# Patient Record
Sex: FEMALE | Race: ASIAN | NOT HISPANIC OR LATINO | Employment: FULL TIME | URBAN - METROPOLITAN AREA
[De-identification: names, ages, dates, MRNs, and addresses within clinical notes are randomized per-mention and may not be internally consistent; named-entity substitution may affect disease eponyms.]

---

## 2017-03-09 ENCOUNTER — AMBULATORY - HEALTHEAST (OUTPATIENT)
Dept: LAB | Facility: CLINIC | Age: 27
End: 2017-03-09

## 2017-03-09 DIAGNOSIS — Z02.89 REFUGEE HEALTH EXAMINATION: ICD-10-CM

## 2017-03-10 ENCOUNTER — AMBULATORY - HEALTHEAST (OUTPATIENT)
Dept: LAB | Facility: CLINIC | Age: 27
End: 2017-03-10

## 2017-03-10 DIAGNOSIS — Z02.89 REFUGEE HEALTH EXAMINATION: ICD-10-CM

## 2017-03-10 LAB
HIV 1+2 AB+HIV1 P24 AG SERPL QL IA: NEGATIVE
LDLC SERPL CALC-MCNC: 142 MG/DL

## 2017-03-11 LAB — HBV SURFACE AG SERPL QL IA: NEGATIVE

## 2017-03-13 LAB
HAV IGG SER QL IA: POSITIVE
HBV CORE AB SERPL QL IA: NEGATIVE
HCV AB SERPL QL IA: NEGATIVE
HEPATITIS B SURFACE ANTIBODY LHE- HISTORICAL: POSITIVE

## 2017-03-23 ENCOUNTER — OFFICE VISIT - HEALTHEAST (OUTPATIENT)
Dept: FAMILY MEDICINE | Facility: CLINIC | Age: 27
End: 2017-03-23

## 2017-03-23 DIAGNOSIS — E11.9 TYPE II DIABETES MELLITUS (H): ICD-10-CM

## 2017-03-23 DIAGNOSIS — Z02.89 REFUGEE HEALTH EXAMINATION: ICD-10-CM

## 2017-03-23 DIAGNOSIS — E66.9 OBESITY: ICD-10-CM

## 2017-03-23 DIAGNOSIS — E78.00 HYPERCHOLESTEREMIA: ICD-10-CM

## 2017-03-23 LAB — HBA1C MFR BLD: 8.1 % (ref 3.5–6)

## 2017-03-23 ASSESSMENT — MIFFLIN-ST. JEOR: SCORE: 1543.7

## 2017-04-13 ENCOUNTER — OFFICE VISIT - HEALTHEAST (OUTPATIENT)
Dept: FAMILY MEDICINE | Facility: CLINIC | Age: 27
End: 2017-04-13

## 2017-04-13 DIAGNOSIS — Z02.1 ENCOUNTER FOR PRE-EMPLOYMENT EXAMINATION: ICD-10-CM

## 2017-06-14 ENCOUNTER — AMBULATORY - HEALTHEAST (OUTPATIENT)
Dept: NURSING | Facility: CLINIC | Age: 27
End: 2017-06-14

## 2017-06-29 ENCOUNTER — OFFICE VISIT - HEALTHEAST (OUTPATIENT)
Dept: FAMILY MEDICINE | Facility: CLINIC | Age: 27
End: 2017-06-29

## 2017-06-29 DIAGNOSIS — E11.9 TYPE 2 DIABETES MELLITUS TREATED WITHOUT INSULIN (H): ICD-10-CM

## 2017-06-29 DIAGNOSIS — Z23 IMMUNIZATION DUE: ICD-10-CM

## 2017-06-29 DIAGNOSIS — Z30.09 CONTRACEPTIVE EDUCATION: ICD-10-CM

## 2017-06-29 ASSESSMENT — MIFFLIN-ST. JEOR: SCORE: 1509.68

## 2017-06-30 LAB — HBA1C MFR BLD: 13.1 % (ref 4.2–6.1)

## 2017-10-20 ENCOUNTER — OFFICE VISIT - HEALTHEAST (OUTPATIENT)
Dept: FAMILY MEDICINE | Facility: CLINIC | Age: 27
End: 2017-10-20

## 2017-10-20 DIAGNOSIS — Z23 NEED FOR INFLUENZA VACCINATION: ICD-10-CM

## 2017-10-20 DIAGNOSIS — E11.9 TYPE 2 DIABETES MELLITUS TREATED WITHOUT INSULIN (H): ICD-10-CM

## 2017-10-20 LAB — HBA1C MFR BLD: 10.8 % (ref 3.5–6)

## 2017-10-20 ASSESSMENT — MIFFLIN-ST. JEOR: SCORE: 1525.56

## 2017-11-15 ENCOUNTER — AMBULATORY - HEALTHEAST (OUTPATIENT)
Dept: FAMILY MEDICINE | Facility: CLINIC | Age: 27
End: 2017-11-15

## 2017-11-15 ENCOUNTER — COMMUNICATION - HEALTHEAST (OUTPATIENT)
Dept: FAMILY MEDICINE | Facility: CLINIC | Age: 27
End: 2017-11-15

## 2017-11-15 ENCOUNTER — OFFICE VISIT - HEALTHEAST (OUTPATIENT)
Dept: FAMILY MEDICINE | Facility: CLINIC | Age: 27
End: 2017-11-15

## 2017-11-15 DIAGNOSIS — Z13.220 SCREENING FOR LIPID DISORDERS: ICD-10-CM

## 2017-11-15 DIAGNOSIS — E78.5 HYPERLIPIDEMIA: ICD-10-CM

## 2017-11-15 DIAGNOSIS — E11.9 TYPE 2 DIABETES MELLITUS TREATED WITHOUT INSULIN (H): ICD-10-CM

## 2017-11-15 LAB
CHOLEST SERPL-MCNC: 235 MG/DL
FASTING STATUS PATIENT QL REPORTED: ABNORMAL
HDLC SERPL-MCNC: 44 MG/DL
LDLC SERPL CALC-MCNC: 162 MG/DL
TRIGL SERPL-MCNC: 143 MG/DL

## 2017-11-15 ASSESSMENT — MIFFLIN-ST. JEOR: SCORE: 1528.96

## 2017-11-17 ENCOUNTER — COMMUNICATION - HEALTHEAST (OUTPATIENT)
Dept: FAMILY MEDICINE | Facility: CLINIC | Age: 27
End: 2017-11-17

## 2017-12-21 ENCOUNTER — OFFICE VISIT - HEALTHEAST (OUTPATIENT)
Dept: FAMILY MEDICINE | Facility: CLINIC | Age: 27
End: 2017-12-21

## 2017-12-21 DIAGNOSIS — N91.2 ABSENCE OF MENSTRUATION: ICD-10-CM

## 2017-12-21 DIAGNOSIS — E11.9 TYPE 2 DIABETES MELLITUS TREATED WITHOUT INSULIN (H): ICD-10-CM

## 2017-12-21 DIAGNOSIS — O09.90 HIGH-RISK PREGNANCY: ICD-10-CM

## 2018-01-09 ENCOUNTER — RECORDS - HEALTHEAST (OUTPATIENT)
Dept: ADMINISTRATIVE | Facility: OTHER | Age: 28
End: 2018-01-09

## 2018-01-10 ENCOUNTER — COMMUNICATION - HEALTHEAST (OUTPATIENT)
Dept: ENDOCRINOLOGY | Facility: CLINIC | Age: 28
End: 2018-01-10

## 2018-01-15 ENCOUNTER — RECORDS - HEALTHEAST (OUTPATIENT)
Dept: ADMINISTRATIVE | Facility: OTHER | Age: 28
End: 2018-01-15

## 2018-01-16 ENCOUNTER — OFFICE VISIT - HEALTHEAST (OUTPATIENT)
Dept: EDUCATION SERVICES | Facility: CLINIC | Age: 28
End: 2018-01-16

## 2018-01-16 DIAGNOSIS — O24.112 PRE-EXISTING TYPE 2 DIABETES MELLITUS DURING PREGNANCY IN SECOND TRIMESTER: ICD-10-CM

## 2018-01-22 ENCOUNTER — OFFICE VISIT - HEALTHEAST (OUTPATIENT)
Dept: EDUCATION SERVICES | Facility: CLINIC | Age: 28
End: 2018-01-22

## 2018-01-22 ENCOUNTER — COMMUNICATION - HEALTHEAST (OUTPATIENT)
Dept: EDUCATION SERVICES | Facility: CLINIC | Age: 28
End: 2018-01-22

## 2018-01-25 ENCOUNTER — MEDICAL CORRESPONDENCE (OUTPATIENT)
Dept: HEALTH INFORMATION MANAGEMENT | Facility: CLINIC | Age: 28
End: 2018-01-25

## 2018-01-25 ENCOUNTER — TRANSFERRED RECORDS (OUTPATIENT)
Dept: HEALTH INFORMATION MANAGEMENT | Facility: CLINIC | Age: 28
End: 2018-01-25

## 2018-01-29 ENCOUNTER — TELEPHONE (OUTPATIENT)
Dept: MATERNAL FETAL MEDICINE | Facility: CLINIC | Age: 28
End: 2018-01-29

## 2018-01-29 NOTE — TELEPHONE ENCOUNTER
PCC LM for Eh regarding consult request received from [facility]. Encouraged patient to return call to discuss where she is seeing Endocrinology- may need TROY for these records. PCC number left on .

## 2018-01-31 ENCOUNTER — TELEPHONE (OUTPATIENT)
Dept: MATERNAL FETAL MEDICINE | Facility: CLINIC | Age: 28
End: 2018-01-31

## 2018-01-31 DIAGNOSIS — E11.9 DIABETES MELLITUS, TYPE 2 (H): Primary | ICD-10-CM

## 2018-01-31 DIAGNOSIS — Z33.1 PREGNANCY, INCIDENTAL: ICD-10-CM

## 2018-01-31 NOTE — TELEPHONE ENCOUNTER
PCC called  using Kellie . Patient states her 4 previous children were born in [place]. She said she goes to a clinic in [place] for her diabetes ([facility] prenatal states she has seen a HE clinic once but has not returned). Communication was difficult over the phone. PCC discussed consult request with Dr Woodruff- donna to schedule  for L2/fetal ECHO/consult.

## 2018-02-01 ENCOUNTER — OFFICE VISIT - HEALTHEAST (OUTPATIENT)
Dept: EDUCATION SERVICES | Facility: CLINIC | Age: 28
End: 2018-02-01

## 2018-02-08 ENCOUNTER — COMMUNICATION - HEALTHEAST (OUTPATIENT)
Dept: EDUCATION SERVICES | Facility: CLINIC | Age: 28
End: 2018-02-08

## 2018-02-08 ENCOUNTER — OFFICE VISIT - HEALTHEAST (OUTPATIENT)
Dept: EDUCATION SERVICES | Facility: CLINIC | Age: 28
End: 2018-02-08

## 2018-02-13 ENCOUNTER — PRE VISIT (OUTPATIENT)
Dept: MATERNAL FETAL MEDICINE | Facility: CLINIC | Age: 28
End: 2018-02-13

## 2018-02-15 ENCOUNTER — AMBULATORY - HEALTHEAST (OUTPATIENT)
Dept: EDUCATION SERVICES | Facility: CLINIC | Age: 28
End: 2018-02-15

## 2018-02-15 DIAGNOSIS — O24.112 PRE-EXISTING TYPE 2 DIABETES MELLITUS DURING PREGNANCY IN SECOND TRIMESTER: ICD-10-CM

## 2018-02-15 LAB — HBA1C MFR BLD: 8 % (ref 3.5–6)

## 2018-02-22 ENCOUNTER — OFFICE VISIT - HEALTHEAST (OUTPATIENT)
Dept: ENDOCRINOLOGY | Facility: CLINIC | Age: 28
End: 2018-02-22

## 2018-02-22 ENCOUNTER — OFFICE VISIT - HEALTHEAST (OUTPATIENT)
Dept: EDUCATION SERVICES | Facility: CLINIC | Age: 28
End: 2018-02-22

## 2018-02-22 DIAGNOSIS — O24.112 PRE-EXISTING TYPE 2 DIABETES MELLITUS DURING PREGNANCY IN SECOND TRIMESTER: ICD-10-CM

## 2018-02-22 DIAGNOSIS — O24.112 PREGNANCY WITH TYPE 2 DIABETES MELLITUS IN SECOND TRIMESTER: ICD-10-CM

## 2018-02-22 ASSESSMENT — MIFFLIN-ST. JEOR: SCORE: 1588.15

## 2018-02-26 ENCOUNTER — AMBULATORY - HEALTHEAST (OUTPATIENT)
Dept: NURSING | Facility: CLINIC | Age: 28
End: 2018-02-26

## 2018-03-01 ENCOUNTER — OFFICE VISIT - HEALTHEAST (OUTPATIENT)
Dept: EDUCATION SERVICES | Facility: CLINIC | Age: 28
End: 2018-03-01

## 2018-03-01 ENCOUNTER — OFFICE VISIT - HEALTHEAST (OUTPATIENT)
Dept: ENDOCRINOLOGY | Facility: CLINIC | Age: 28
End: 2018-03-01

## 2018-03-01 DIAGNOSIS — O24.112 PREGNANCY WITH TYPE 2 DIABETES MELLITUS IN SECOND TRIMESTER: ICD-10-CM

## 2018-03-01 ASSESSMENT — MIFFLIN-ST. JEOR: SCORE: 1599.04

## 2018-03-05 ENCOUNTER — TELEPHONE (OUTPATIENT)
Dept: MATERNAL FETAL MEDICINE | Facility: CLINIC | Age: 28
End: 2018-03-05

## 2018-03-05 NOTE — TELEPHONE ENCOUNTER
MFM received referral from [facility].  Phone call made to referring clinic to notify that patient has no showed x 2 for her MFM appointments, including a no show for a fetal echocardiogram.    Sandrine Graves

## 2018-03-08 ENCOUNTER — OFFICE VISIT - HEALTHEAST (OUTPATIENT)
Dept: ENDOCRINOLOGY | Facility: CLINIC | Age: 28
End: 2018-03-08

## 2018-03-08 ENCOUNTER — OFFICE VISIT - HEALTHEAST (OUTPATIENT)
Dept: EDUCATION SERVICES | Facility: CLINIC | Age: 28
End: 2018-03-08

## 2018-03-08 DIAGNOSIS — O24.112 PRE-EXISTING TYPE 2 DIABETES MELLITUS DURING PREGNANCY IN SECOND TRIMESTER: ICD-10-CM

## 2018-03-08 DIAGNOSIS — O24.112 PREGNANCY WITH TYPE 2 DIABETES MELLITUS IN SECOND TRIMESTER: ICD-10-CM

## 2018-03-08 ASSESSMENT — MIFFLIN-ST. JEOR: SCORE: 1596.41

## 2018-03-15 ENCOUNTER — OFFICE VISIT - HEALTHEAST (OUTPATIENT)
Dept: EDUCATION SERVICES | Facility: CLINIC | Age: 28
End: 2018-03-15

## 2018-03-15 ENCOUNTER — OFFICE VISIT - HEALTHEAST (OUTPATIENT)
Dept: ENDOCRINOLOGY | Facility: CLINIC | Age: 28
End: 2018-03-15

## 2018-03-15 DIAGNOSIS — O24.112 PREGNANCY WITH TYPE 2 DIABETES MELLITUS IN SECOND TRIMESTER: ICD-10-CM

## 2018-03-15 ASSESSMENT — MIFFLIN-ST. JEOR: SCORE: 1613.1

## 2018-03-16 ENCOUNTER — CARE COORDINATION (OUTPATIENT)
Dept: MATERNAL FETAL MEDICINE | Facility: CLINIC | Age: 28
End: 2018-03-16

## 2018-03-22 ENCOUNTER — OFFICE VISIT - HEALTHEAST (OUTPATIENT)
Dept: ENDOCRINOLOGY | Facility: CLINIC | Age: 28
End: 2018-03-22

## 2018-03-22 DIAGNOSIS — O24.112 PRE-EXISTING TYPE 2 DIABETES MELLITUS DURING PREGNANCY IN SECOND TRIMESTER: ICD-10-CM

## 2018-03-22 ASSESSMENT — MIFFLIN-ST. JEOR: SCORE: 1614.01

## 2018-03-27 ENCOUNTER — TELEPHONE (OUTPATIENT)
Dept: MATERNAL FETAL MEDICINE | Facility: CLINIC | Age: 28
End: 2018-03-27

## 2018-03-27 NOTE — TELEPHONE ENCOUNTER
Spoke to Katharine, physician assistance, at [facility] regarding to patient no showing [facility] appointments 3x's. Katharine state that patient was last seen in January and have not come in for check up since. Assistance said to remove order and if patient still wants to be seen at [facility], patient will call.      Referring clinic notified. Removing order.    Hector Loredo,    , [facility]     Patient has also no showed for fetal echocardiogram appointments.  Referring clinic notified.  Removing echo order.    Sandrine Graves

## 2018-04-05 ENCOUNTER — OFFICE VISIT - HEALTHEAST (OUTPATIENT)
Dept: EDUCATION SERVICES | Facility: CLINIC | Age: 28
End: 2018-04-05

## 2018-04-05 ENCOUNTER — OFFICE VISIT - HEALTHEAST (OUTPATIENT)
Dept: ENDOCRINOLOGY | Facility: CLINIC | Age: 28
End: 2018-04-05

## 2018-04-05 DIAGNOSIS — O24.112 PREGNANCY WITH TYPE 2 DIABETES MELLITUS IN SECOND TRIMESTER: ICD-10-CM

## 2018-04-05 DIAGNOSIS — O24.113 PRE-EXISTING TYPE 2 DIABETES MELLITUS DURING PREGNANCY IN THIRD TRIMESTER: ICD-10-CM

## 2018-04-05 ASSESSMENT — MIFFLIN-ST. JEOR: SCORE: 1620.81

## 2018-04-18 ENCOUNTER — OFFICE VISIT - HEALTHEAST (OUTPATIENT)
Dept: FAMILY MEDICINE | Facility: CLINIC | Age: 28
End: 2018-04-18

## 2018-04-18 DIAGNOSIS — R03.0 ELEVATED BLOOD PRESSURE READING: ICD-10-CM

## 2018-04-18 DIAGNOSIS — Z28.39 IMMUNIZATION DEFICIENCY: ICD-10-CM

## 2018-04-18 ASSESSMENT — MIFFLIN-ST. JEOR: SCORE: 731.76

## 2018-04-19 ENCOUNTER — OFFICE VISIT - HEALTHEAST (OUTPATIENT)
Dept: ENDOCRINOLOGY | Facility: CLINIC | Age: 28
End: 2018-04-19

## 2018-04-19 ENCOUNTER — OFFICE VISIT - HEALTHEAST (OUTPATIENT)
Dept: EDUCATION SERVICES | Facility: CLINIC | Age: 28
End: 2018-04-19

## 2018-04-19 DIAGNOSIS — O24.112 PREGNANCY WITH TYPE 2 DIABETES MELLITUS IN SECOND TRIMESTER: ICD-10-CM

## 2018-04-19 DIAGNOSIS — O24.113 PRE-EXISTING TYPE 2 DIABETES MELLITUS DURING PREGNANCY IN THIRD TRIMESTER: ICD-10-CM

## 2018-04-19 ASSESSMENT — MIFFLIN-ST. JEOR: SCORE: 1672.52

## 2018-05-03 ENCOUNTER — OFFICE VISIT - HEALTHEAST (OUTPATIENT)
Dept: EDUCATION SERVICES | Facility: CLINIC | Age: 28
End: 2018-05-03

## 2018-05-07 ENCOUNTER — COMMUNICATION - HEALTHEAST (OUTPATIENT)
Dept: ADMINISTRATIVE | Facility: CLINIC | Age: 28
End: 2018-05-07

## 2018-05-10 ENCOUNTER — OFFICE VISIT - HEALTHEAST (OUTPATIENT)
Dept: EDUCATION SERVICES | Facility: CLINIC | Age: 28
End: 2018-05-10

## 2018-05-10 DIAGNOSIS — O24.112 PRE-EXISTING TYPE 2 DIABETES MELLITUS DURING PREGNANCY IN SECOND TRIMESTER: ICD-10-CM

## 2018-05-18 ENCOUNTER — AMBULATORY - HEALTHEAST (OUTPATIENT)
Dept: ENDOCRINOLOGY | Facility: CLINIC | Age: 28
End: 2018-05-18

## 2018-05-18 ENCOUNTER — COMMUNICATION - HEALTHEAST (OUTPATIENT)
Dept: ENDOCRINOLOGY | Facility: CLINIC | Age: 28
End: 2018-05-18

## 2018-05-18 DIAGNOSIS — E11.9 TYPE 2 DIABETES MELLITUS TREATED WITHOUT INSULIN (H): ICD-10-CM

## 2018-05-23 ENCOUNTER — AMBULATORY - HEALTHEAST (OUTPATIENT)
Dept: LAB | Facility: CLINIC | Age: 28
End: 2018-05-23

## 2018-05-23 DIAGNOSIS — E11.9 TYPE 2 DIABETES MELLITUS TREATED WITHOUT INSULIN (H): ICD-10-CM

## 2018-05-23 LAB
ANION GAP SERPL CALCULATED.3IONS-SCNC: 12 MMOL/L (ref 5–18)
BUN SERPL-MCNC: 7 MG/DL (ref 8–22)
CALCIUM SERPL-MCNC: 9.4 MG/DL (ref 8.5–10.5)
CHLORIDE BLD-SCNC: 103 MMOL/L (ref 98–107)
CO2 SERPL-SCNC: 22 MMOL/L (ref 22–31)
CREAT SERPL-MCNC: 0.5 MG/DL (ref 0.6–1.1)
GFR SERPL CREATININE-BSD FRML MDRD: >60 ML/MIN/1.73M2
GLUCOSE BLD-MCNC: 85 MG/DL (ref 70–125)
HBA1C MFR BLD: 6.9 % (ref 3.5–6)
POTASSIUM BLD-SCNC: 4.2 MMOL/L (ref 3.5–5)
SODIUM SERPL-SCNC: 137 MMOL/L (ref 136–145)

## 2018-05-31 ENCOUNTER — OFFICE VISIT - HEALTHEAST (OUTPATIENT)
Dept: EDUCATION SERVICES | Facility: CLINIC | Age: 28
End: 2018-05-31

## 2018-06-14 ENCOUNTER — ANESTHESIA - HEALTHEAST (OUTPATIENT)
Dept: OBGYN | Facility: HOSPITAL | Age: 28
End: 2018-06-14

## 2018-06-14 ENCOUNTER — SURGERY - HEALTHEAST (OUTPATIENT)
Dept: OBGYN | Facility: HOSPITAL | Age: 28
End: 2018-06-14

## 2018-06-15 ENCOUNTER — HOME CARE/HOSPICE - HEALTHEAST (OUTPATIENT)
Dept: HOME HEALTH SERVICES | Facility: HOME HEALTH | Age: 28
End: 2018-06-15

## 2018-06-19 ENCOUNTER — COMMUNICATION - HEALTHEAST (OUTPATIENT)
Dept: ADMINISTRATIVE | Facility: CLINIC | Age: 28
End: 2018-06-19

## 2018-06-20 ENCOUNTER — HOME CARE/HOSPICE - HEALTHEAST (OUTPATIENT)
Dept: HOME HEALTH SERVICES | Facility: HOME HEALTH | Age: 28
End: 2018-06-20

## 2018-06-20 ENCOUNTER — COMMUNICATION - HEALTHEAST (OUTPATIENT)
Dept: FAMILY MEDICINE | Facility: CLINIC | Age: 28
End: 2018-06-20

## 2018-06-28 ENCOUNTER — OFFICE VISIT - HEALTHEAST (OUTPATIENT)
Dept: FAMILY MEDICINE | Facility: CLINIC | Age: 28
End: 2018-06-28

## 2018-06-28 DIAGNOSIS — E11.9 TYPE 2 DIABETES MELLITUS TREATED WITHOUT INSULIN (H): ICD-10-CM

## 2018-06-28 DIAGNOSIS — Z91.148 NONCOMPLIANCE WITH MEDICATION REGIMEN: ICD-10-CM

## 2018-06-28 DIAGNOSIS — E83.41 HYPERMAGNESEMIA: ICD-10-CM

## 2018-06-28 DIAGNOSIS — E78.5 HYPERLIPIDEMIA: ICD-10-CM

## 2018-06-28 DIAGNOSIS — I10 ESSENTIAL HYPERTENSION: ICD-10-CM

## 2018-06-28 LAB
ANION GAP SERPL CALCULATED.3IONS-SCNC: 10 MMOL/L (ref 5–18)
BUN SERPL-MCNC: 8 MG/DL (ref 8–22)
CALCIUM SERPL-MCNC: 10 MG/DL (ref 8.5–10.5)
CHLORIDE BLD-SCNC: 102 MMOL/L (ref 98–107)
CHOLEST SERPL-MCNC: 281 MG/DL
CO2 SERPL-SCNC: 27 MMOL/L (ref 22–31)
CREAT SERPL-MCNC: 0.62 MG/DL (ref 0.6–1.1)
CREAT UR-MCNC: 142.5 MG/DL
FASTING STATUS PATIENT QL REPORTED: NO
GFR SERPL CREATININE-BSD FRML MDRD: >60 ML/MIN/1.73M2
GLUCOSE BLD-MCNC: 145 MG/DL (ref 70–125)
HDLC SERPL-MCNC: 36 MG/DL
LDLC SERPL CALC-MCNC: 154 MG/DL
LDLC SERPL CALC-MCNC: ABNORMAL MG/DL
MAGNESIUM SERPL-MCNC: 1.8 MG/DL (ref 1.8–2.6)
MICROALBUMIN UR-MCNC: 45.9 MG/DL (ref 0–1.99)
MICROALBUMIN/CREAT UR: 322.1 MG/G
POTASSIUM BLD-SCNC: 4 MMOL/L (ref 3.5–5)
SODIUM SERPL-SCNC: 139 MMOL/L (ref 136–145)
TRIGL SERPL-MCNC: 449 MG/DL

## 2018-06-28 RX ORDER — LISINOPRIL 20 MG/1
20 TABLET ORAL DAILY
Qty: 90 TABLET | Refills: 3 | Status: SHIPPED | OUTPATIENT
Start: 2018-06-28 | End: 2021-11-01

## 2018-06-28 ASSESSMENT — MIFFLIN-ST. JEOR: SCORE: 1578.85

## 2018-10-03 ENCOUNTER — AMBULATORY - HEALTHEAST (OUTPATIENT)
Dept: NURSING | Facility: CLINIC | Age: 28
End: 2018-10-03

## 2018-10-03 DIAGNOSIS — Z23 NEED FOR IMMUNIZATION AGAINST INFLUENZA: ICD-10-CM

## 2018-11-19 ENCOUNTER — COMMUNICATION - HEALTHEAST (OUTPATIENT)
Dept: FAMILY MEDICINE | Facility: CLINIC | Age: 28
End: 2018-11-19

## 2018-12-26 ENCOUNTER — OFFICE VISIT - HEALTHEAST (OUTPATIENT)
Dept: FAMILY MEDICINE | Facility: CLINIC | Age: 28
End: 2018-12-26

## 2019-01-08 ENCOUNTER — COMMUNICATION - HEALTHEAST (OUTPATIENT)
Dept: FAMILY MEDICINE | Facility: CLINIC | Age: 29
End: 2019-01-08

## 2019-04-02 ENCOUNTER — COMMUNICATION - HEALTHEAST (OUTPATIENT)
Dept: FAMILY MEDICINE | Facility: CLINIC | Age: 29
End: 2019-04-02

## 2019-04-17 ENCOUNTER — COMMUNICATION - HEALTHEAST (OUTPATIENT)
Dept: FAMILY MEDICINE | Facility: CLINIC | Age: 29
End: 2019-04-17

## 2019-05-10 ENCOUNTER — OFFICE VISIT - HEALTHEAST (OUTPATIENT)
Dept: FAMILY MEDICINE | Facility: CLINIC | Age: 29
End: 2019-05-10

## 2019-05-13 ENCOUNTER — RECORDS - HEALTHEAST (OUTPATIENT)
Dept: FAMILY MEDICINE | Facility: CLINIC | Age: 29
End: 2019-05-13

## 2019-05-13 ENCOUNTER — COMMUNICATION - HEALTHEAST (OUTPATIENT)
Dept: FAMILY MEDICINE | Facility: CLINIC | Age: 29
End: 2019-05-13

## 2019-06-06 ENCOUNTER — OFFICE VISIT - HEALTHEAST (OUTPATIENT)
Dept: FAMILY MEDICINE | Facility: CLINIC | Age: 29
End: 2019-06-06

## 2019-06-06 DIAGNOSIS — I10 ESSENTIAL HYPERTENSION: ICD-10-CM

## 2019-06-06 DIAGNOSIS — E11.9 TYPE 2 DIABETES MELLITUS TREATED WITHOUT INSULIN (H): ICD-10-CM

## 2019-06-06 DIAGNOSIS — E78.00 PURE HYPERCHOLESTEROLEMIA: ICD-10-CM

## 2019-06-06 LAB
ALBUMIN SERPL-MCNC: 3.9 G/DL (ref 3.5–5)
ALP SERPL-CCNC: 46 U/L (ref 45–120)
ALT SERPL W P-5'-P-CCNC: 64 U/L (ref 0–45)
ANION GAP SERPL CALCULATED.3IONS-SCNC: 8 MMOL/L (ref 5–18)
AST SERPL W P-5'-P-CCNC: 45 U/L (ref 0–40)
BILIRUB SERPL-MCNC: 0.6 MG/DL (ref 0–1)
BUN SERPL-MCNC: 9 MG/DL (ref 8–22)
CALCIUM SERPL-MCNC: 9.6 MG/DL (ref 8.5–10.5)
CHLORIDE BLD-SCNC: 104 MMOL/L (ref 98–107)
CHOLEST SERPL-MCNC: 229 MG/DL
CO2 SERPL-SCNC: 24 MMOL/L (ref 22–31)
CREAT SERPL-MCNC: 0.64 MG/DL (ref 0.6–1.1)
CREAT UR-MCNC: 172.9 MG/DL
FASTING STATUS PATIENT QL REPORTED: NO
GFR SERPL CREATININE-BSD FRML MDRD: >60 ML/MIN/1.73M2
GLUCOSE BLD-MCNC: 175 MG/DL (ref 70–125)
HBA1C MFR BLD: 10.2 % (ref 3.5–6)
HDLC SERPL-MCNC: 49 MG/DL
LDLC SERPL CALC-MCNC: 151 MG/DL
MICROALBUMIN UR-MCNC: 62.41 MG/DL (ref 0–1.99)
MICROALBUMIN/CREAT UR: 361 MG/G
POTASSIUM BLD-SCNC: 4.3 MMOL/L (ref 3.5–5)
PROT SERPL-MCNC: 7.9 G/DL (ref 6–8)
SODIUM SERPL-SCNC: 136 MMOL/L (ref 136–145)
TRIGL SERPL-MCNC: 146 MG/DL

## 2019-06-06 RX ORDER — GLUCOSAMINE HCL/CHONDROITIN SU 500-400 MG
1 CAPSULE ORAL PRN
Qty: 200 STRIP | Refills: 11 | Status: SHIPPED | OUTPATIENT
Start: 2019-06-06 | End: 2021-11-01

## 2019-06-06 RX ORDER — ATORVASTATIN CALCIUM 20 MG/1
20 TABLET, FILM COATED ORAL AT BEDTIME
Qty: 90 TABLET | Refills: 3 | Status: SHIPPED | OUTPATIENT
Start: 2019-06-06 | End: 2021-11-01

## 2019-06-06 ASSESSMENT — MIFFLIN-ST. JEOR: SCORE: 1475.09

## 2019-06-13 ENCOUNTER — COMMUNICATION - HEALTHEAST (OUTPATIENT)
Dept: FAMILY MEDICINE | Facility: CLINIC | Age: 29
End: 2019-06-13

## 2019-06-18 ENCOUNTER — COMMUNICATION - HEALTHEAST (OUTPATIENT)
Dept: ADMINISTRATIVE | Facility: CLINIC | Age: 29
End: 2019-06-18

## 2019-07-05 ENCOUNTER — OFFICE VISIT - HEALTHEAST (OUTPATIENT)
Dept: FAMILY MEDICINE | Facility: CLINIC | Age: 29
End: 2019-07-05

## 2019-07-08 ENCOUNTER — COMMUNICATION - HEALTHEAST (OUTPATIENT)
Dept: FAMILY MEDICINE | Facility: CLINIC | Age: 29
End: 2019-07-08

## 2019-07-16 ENCOUNTER — OFFICE VISIT - HEALTHEAST (OUTPATIENT)
Dept: FAMILY MEDICINE | Facility: CLINIC | Age: 29
End: 2019-07-16

## 2019-07-16 ENCOUNTER — COMMUNICATION - HEALTHEAST (OUTPATIENT)
Dept: FAMILY MEDICINE | Facility: CLINIC | Age: 29
End: 2019-07-16

## 2019-07-16 DIAGNOSIS — Z91.148 NONCOMPLIANCE WITH MEDICATIONS: ICD-10-CM

## 2019-07-16 DIAGNOSIS — E78.5 HYPERLIPIDEMIA, UNSPECIFIED HYPERLIPIDEMIA TYPE: ICD-10-CM

## 2019-07-16 DIAGNOSIS — E11.9 TYPE 2 DIABETES MELLITUS TREATED WITHOUT INSULIN (H): ICD-10-CM

## 2019-07-16 RX ORDER — GLUCOSAMINE HCL/CHONDROITIN SU 500-400 MG
1 CAPSULE ORAL 2 TIMES DAILY
Qty: 150 STRIP | Refills: 6 | Status: SHIPPED | OUTPATIENT
Start: 2019-07-16 | End: 2021-11-01

## 2019-07-16 ASSESSMENT — MIFFLIN-ST. JEOR: SCORE: 1473.39

## 2019-08-30 ENCOUNTER — OFFICE VISIT - HEALTHEAST (OUTPATIENT)
Dept: FAMILY MEDICINE | Facility: CLINIC | Age: 29
End: 2019-08-30

## 2019-09-06 ENCOUNTER — COMMUNICATION - HEALTHEAST (OUTPATIENT)
Dept: FAMILY MEDICINE | Facility: CLINIC | Age: 29
End: 2019-09-06

## 2019-10-03 ENCOUNTER — COMMUNICATION - HEALTHEAST (OUTPATIENT)
Dept: FAMILY MEDICINE | Facility: CLINIC | Age: 29
End: 2019-10-03

## 2019-12-03 ENCOUNTER — AMBULATORY - HEALTHEAST (OUTPATIENT)
Dept: NURSING | Facility: CLINIC | Age: 29
End: 2019-12-03

## 2019-12-09 ENCOUNTER — COMMUNICATION - HEALTHEAST (OUTPATIENT)
Dept: FAMILY MEDICINE | Facility: CLINIC | Age: 29
End: 2019-12-09

## 2020-01-16 ENCOUNTER — COMMUNICATION - HEALTHEAST (OUTPATIENT)
Dept: LAB | Facility: CLINIC | Age: 30
End: 2020-01-16

## 2020-01-16 ENCOUNTER — OFFICE VISIT - HEALTHEAST (OUTPATIENT)
Dept: FAMILY MEDICINE | Facility: CLINIC | Age: 30
End: 2020-01-16

## 2020-01-16 DIAGNOSIS — Z91.148 NONCOMPLIANCE WITH MEDICATION REGIMEN: ICD-10-CM

## 2020-01-16 DIAGNOSIS — Z23 IMMUNIZATION DUE: ICD-10-CM

## 2020-01-16 DIAGNOSIS — E11.9 TYPE 2 DIABETES MELLITUS TREATED WITHOUT INSULIN (H): ICD-10-CM

## 2020-01-16 DIAGNOSIS — E78.5 HYPERLIPIDEMIA, UNSPECIFIED HYPERLIPIDEMIA TYPE: ICD-10-CM

## 2020-01-16 LAB
ALBUMIN SERPL-MCNC: 3.8 G/DL (ref 3.5–5)
ALP SERPL-CCNC: 50 U/L (ref 45–120)
ALT SERPL W P-5'-P-CCNC: 36 U/L (ref 0–45)
ANION GAP SERPL CALCULATED.3IONS-SCNC: 10 MMOL/L (ref 5–18)
AST SERPL W P-5'-P-CCNC: 24 U/L (ref 0–40)
BILIRUB SERPL-MCNC: 0.6 MG/DL (ref 0–1)
BUN SERPL-MCNC: 10 MG/DL (ref 8–22)
CALCIUM SERPL-MCNC: 9.6 MG/DL (ref 8.5–10.5)
CHLORIDE BLD-SCNC: 100 MMOL/L (ref 98–107)
CHOLEST SERPL-MCNC: 215 MG/DL
CO2 SERPL-SCNC: 23 MMOL/L (ref 22–31)
CREAT SERPL-MCNC: 0.89 MG/DL (ref 0.6–1.1)
FASTING STATUS PATIENT QL REPORTED: NO
GFR SERPL CREATININE-BSD FRML MDRD: >60 ML/MIN/1.73M2
GLUCOSE BLD-MCNC: 523 MG/DL (ref 70–125)
HBA1C MFR BLD: 10.9 % (ref 3.5–6)
HDLC SERPL-MCNC: 41 MG/DL
LDLC SERPL CALC-MCNC: 128 MG/DL
POTASSIUM BLD-SCNC: 4.6 MMOL/L (ref 3.5–5)
PROT SERPL-MCNC: 7.8 G/DL (ref 6–8)
SODIUM SERPL-SCNC: 133 MMOL/L (ref 136–145)
TRIGL SERPL-MCNC: 230 MG/DL

## 2020-01-16 ASSESSMENT — MIFFLIN-ST. JEOR: SCORE: 1496.07

## 2020-01-17 ENCOUNTER — COMMUNICATION - HEALTHEAST (OUTPATIENT)
Dept: NURSING | Facility: CLINIC | Age: 30
End: 2020-01-17

## 2020-01-22 ENCOUNTER — COMMUNICATION - HEALTHEAST (OUTPATIENT)
Dept: ADMINISTRATIVE | Facility: CLINIC | Age: 30
End: 2020-01-22

## 2020-12-07 ENCOUNTER — COMMUNICATION - HEALTHEAST (OUTPATIENT)
Dept: FAMILY MEDICINE | Facility: CLINIC | Age: 30
End: 2020-12-07

## 2021-05-27 NOTE — TELEPHONE ENCOUNTER
Calling to request any exam notes for patient.  TROY was signed and faxed on 11/15/17.     Patient has no diabetic eye exam report in [place], and may be overdue for exam.    Per Aj, patient was seen on 03/29/17 and 06/16/18, but he is only able to release records for 03/29/18 exam unless we obtain a new TROY.    When signed TROY obtained, fax to [facility] at [phone].    Next PCP appointment is 5/10/19.  Thank you.

## 2021-05-27 NOTE — TELEPHONE ENCOUNTER
Called patient via  line. Spoke with patient regarding diabetic follow-up due. Patient schedule for May 10, 2019.

## 2021-05-28 NOTE — TELEPHONE ENCOUNTER
Called patient via  line and help schedule appointment with  f/gerard Diabetes and bp check, patient decline appointment at first states busy with children and taking care of her mother. I offer to schedule appointment  same day as her mother /children appointment patient agree I was able to schedule her for June 6, 2019 at 10:20 am.

## 2021-05-28 NOTE — TELEPHONE ENCOUNTER
[facility] was able to reach the patient with an . CMT notified the patient that she is being asked to come to clinic if able to completed updated TROY forms.     The patient verbalizes understanding of provider/CSS instructions for follow-up and continued care per provider message.

## 2021-05-29 NOTE — TELEPHONE ENCOUNTER
I will discuss with the patient again when she comes in for follow up.    Dr. Juan David Coppola  6/18/2019 1:54 PM

## 2021-05-29 NOTE — PROGRESS NOTES
"ASSESMENT AND PLAN:  Diagnoses and all orders for this visit:    Type 2 diabetes mellitus treated without insulin (H)  -     Glycosylated Hemoglobin A1C  -     Microalbumin, Random Urine  -     Ambulatory referral to Ophthalmology  -     Lipid Cascade  -     metFORMIN (GLUCOPHAGE) 1000 MG tablet; Take 1 tablet (1,000 mg total) by mouth 2 (two) times a day with meals.  Dispense: 60 tablet; Refill: 5  -     blood glucose test strips; Use 1 each As Directed as needed. Test blood sugar once daily  Dispense: 200 strip; Refill: 11  -     generic lancets (FINGERSTIX LANCETS); Test blood sugar once daily  Dispense: 200 each; Refill: 11  -     blood glucose meter (GLUCOMETER); Use 1 each As Directed as needed. Test blood sugar once a day  Dispense: 1 each; Refill: 0  Restarted metformin.  A1c 10.2 today.  New meter and testing supplies sent.  Instructed to test once a day.  She will follow-up with her [place] pharmacist in a few weeks.  I will see her back in 2 months.    Essential hypertension  -     Comprehensive Metabolic Panel  BP normal without medication.    Pure hypercholesterolemia  -     atorvastatin (LIPITOR) 20 MG tablet; Take 1 tablet (20 mg total) by mouth at bedtime.  Dispense: 90 tablet; Refill: 3  Check lipid today.  Restarted Lipitor.    This transcription uses voice recognition software, which may contain typographical errors.          SUBJECTIVE: Carlos Oliveros is a 28-year-old female with diabetes and hypertension here for follow-up.  Her last A1c was 6.9 in May 2018.  She was last seen here in June 2018.  She took herself off of all her medications stating \" I know I do not have diabetes anymore\".  She is not checking her blood sugar at home.  She is not taking any medications currently.  She denied hypoglycemic symptoms.  She denies abdominal pain, nausea, dizziness or confusions.  She is taking care of her sick parents.  States she exercises regularly.  She states she weighed more than 200 pounds last year but " "now down to 180s.  She does not have any physical concerns.    Past Medical History:   Diagnosis Date     Diabetes mellitus (H)     IDGDM vs Type 2     Hypertension     gestational hypertension     Patient Active Problem List   Diagnosis     Type 2 diabetes mellitus treated without insulin (H)     Hyperlipidemia     Pregnancy with type 2 diabetes mellitus in second trimester     Severe pre-eclampsia     Gestational hypertension     Breech presentation       Allergies:    Allergies   Allergen Reactions     No Known Drug Allergies        Social History     Tobacco Use   Smoking Status Never Smoker   Smokeless Tobacco Never Used       Review of systems otherwise negative except as listed in HPI.   Social History     Tobacco Use   Smoking Status Never Smoker   Smokeless Tobacco Never Used       OBJECTICE: /88 (Patient Site: Right Arm, Patient Position: Sitting, Cuff Size: Adult Regular)   Pulse 75   Temp 98.2  F (36.8  C) (Oral)   Ht 4' 11\" (1.499 m)   Wt 188 lb 6 oz (85.4 kg)   SpO2 98%   BMI 38.05 kg/m      DATA REVIEWED:    Labs Reviewed or Ordered (1):       GEN-alert,  in no apparent distress.  HEENT-mucous membranes are moist, neck is supple.  CV-regular rate and rhythm with no murmur.   RESP-lungs clear to auscultation .  ABDOMEN- Soft , not tender.  EXTREM-normal sensation.  No open lesions or ulcers.  SKIN-normal        Juan David Coppola   6/6/2019   "

## 2021-05-29 NOTE — TELEPHONE ENCOUNTER
Re: Ophthalmology referral    Fyi Dr. Coppola,    I had faxed the order to [facility] since patient had been seen there in the past.  [facility] spoke to patient to help her schedule but she denied scheduling.  She denied having diabetes.     Rocio Lawrence  06.18.19

## 2021-05-29 NOTE — TELEPHONE ENCOUNTER
----- Message from Ashley Brown, Cindy sent at 6/12/2019  9:49 AM CDT -----  Regarding: reach out  Hello,    Based on A1c, this patient would benefit from visiting with MTM pharmacist.  Can we reach out to schedule an initial visit with me for MTM? Based on their current insurance, a visit with me is fully covered and free of charge.     Thank you  Shakir Brown, LyndseyD

## 2021-05-30 VITALS — WEIGHT: 198.38 LBS | BODY MASS INDEX: 40.07 KG/M2

## 2021-05-30 VITALS — BODY MASS INDEX: 41.03 KG/M2 | WEIGHT: 203.5 LBS | HEIGHT: 59 IN

## 2021-05-30 NOTE — PROGRESS NOTES
"ASSESMENT AND PLAN:  Diagnoses and all orders for this visit:    Type 2 diabetes mellitus treated without insulin (H)  -     pen needle, diabetic (BD ULTRA-FINE MARY PEN NEEDLE) 32 gauge x 5/32\" Ndle; Use 1 each As Directed at bedtime.  Dispense: 100 each; Refill: 11  -     blood glucose test (CONTOUR NEXT TEST STRIPS) strips; Use 1 each As Directed 2 (two) times a day.  Dispense: 150 strip; Refill: 6  -     generic lancets (FINGERSTIX LANCETS); Test blood sugar twice daily  Dispense: 200 each; Refill: 11  -     insulin glargine (LANTUS; BASAGLAR) 100 unit/mL (3 mL) pen; Use 10 U daily at bedtime  Dispense: 4 adj dose pen; Refill: 11  Took herself off of metformin, does not want to take pills.  She was on insulin while she was pregnant.  States she knows how to use insulin pen.  Will start with Lantus 10 units at bedtime.  Teaching done on how to use her meter.  Instructed to check her blood sugar at least once a day.  She will follow-up in a few weeks with her blood sugar log.    Hyperlipidemia, unspecified hyperlipidemia type  States she takes her medication as prescribed.    Noncompliance with medications  Did not bring her pill bottles today.  She did not take metformin that was restarted during last visit, states the pill was too big for her to swallow.  She agreed to start insulin.  She does not want to take pills.      Spent 25 min face to face with patient with more the 50% spent in counseling and coordination of care and discussing problems listed above.    This transcription uses voice recognition software, which may contain typographical errors.      SUBJECTIVE: Carlos Desai Lae here to follow-up on diabetes and hyperlipidemia.  She was seen two month ago, she took herself off of all her medications at that time. Metformin was restarted.  She states she stopped taking metformin after 1 day, states the pill was too big for her to swallow.  New prescriptions for meter and supplies sent.  She has not been checking " "her blood sugar stating\" I do not know how to use my new meter\".  Her mother also has diabetes, she is the primary caregiver.    Past Medical History:   Diagnosis Date     Diabetes mellitus (H)     IDGDM vs Type 2     Hypertension     gestational hypertension     Patient Active Problem List   Diagnosis     Type 2 diabetes mellitus treated without insulin (H)     Hyperlipidemia     Pregnancy with type 2 diabetes mellitus in second trimester     Severe pre-eclampsia     Gestational hypertension     Breech presentation       Allergies:    Allergies   Allergen Reactions     No Known Drug Allergies        Social History     Tobacco Use   Smoking Status Never Smoker   Smokeless Tobacco Never Used       Review of systems otherwise negative except as listed in HPI.   Social History     Tobacco Use   Smoking Status Never Smoker   Smokeless Tobacco Never Used       OBJECTICE: /78 (Patient Site: Right Arm, Patient Position: Sitting, Cuff Size: Adult Regular)   Pulse 92   Temp 98.5  F (36.9  C) (Oral)   Resp 16   Ht 4' 11\" (1.499 m)   Wt 188 lb (85.3 kg)   LMP 07/15/2019 (Exact Date)   BMI 37.97 kg/m      DATA REVIEWED:    Labs Reviewed or Ordered (1):       GEN-alert,  in no apparent distress.  HEENT- neck is supple.  CV-regular rate and rhythm with no murmur.   RESP-lungs clear to auscultation .  ABDOMEN- Soft , not tender.  EXTREM- No open lesions or ulcers. Sensation intact.   SKIN-normal        Juan David Coppola   7/16/2019   "

## 2021-05-30 NOTE — TELEPHONE ENCOUNTER
Called to reschedule no show FU DM appt with Dr. Coppola on 07/05/2019 had to leave vm. Please assist in rescheduling when patient calls back

## 2021-05-30 NOTE — TELEPHONE ENCOUNTER
Called [facility] and spoke with Dilia Evans to have medication delivery.  Give verbal order and also faxed over medication order successfully

## 2021-05-31 VITALS — WEIGHT: 200.25 LBS | BODY MASS INDEX: 40.37 KG/M2 | HEIGHT: 59 IN

## 2021-05-31 VITALS — BODY MASS INDEX: 43.14 KG/M2 | WEIGHT: 213.6 LBS

## 2021-05-31 VITALS — WEIGHT: 199.5 LBS | BODY MASS INDEX: 40.22 KG/M2 | HEIGHT: 59 IN

## 2021-05-31 VITALS — HEIGHT: 59 IN | WEIGHT: 196 LBS | BODY MASS INDEX: 39.51 KG/M2

## 2021-05-31 VITALS — BODY MASS INDEX: 41.7 KG/M2 | WEIGHT: 206.44 LBS

## 2021-06-01 VITALS — WEIGHT: 218.8 LBS | BODY MASS INDEX: 44.11 KG/M2 | HEIGHT: 59 IN

## 2021-06-01 VITALS — WEIGHT: 226.38 LBS | HEIGHT: 61 IN | BODY MASS INDEX: 42.74 KG/M2

## 2021-06-01 VITALS — BODY MASS INDEX: 44.15 KG/M2 | WEIGHT: 219 LBS | HEIGHT: 59 IN

## 2021-06-01 VITALS — HEIGHT: 59 IN | WEIGHT: 211.25 LBS | BODY MASS INDEX: 42.59 KG/M2

## 2021-06-01 VITALS — WEIGHT: 24.5 LBS | BODY MASS INDEX: 4.94 KG/M2 | HEIGHT: 59 IN

## 2021-06-01 VITALS — WEIGHT: 211.3 LBS | BODY MASS INDEX: 42.68 KG/M2

## 2021-06-01 VITALS — BODY MASS INDEX: 46.75 KG/M2 | HEIGHT: 59 IN | WEIGHT: 231.9 LBS

## 2021-06-01 VITALS — BODY MASS INDEX: 44.45 KG/M2 | WEIGHT: 220.5 LBS | HEIGHT: 59 IN

## 2021-06-01 VITALS — BODY MASS INDEX: 43 KG/M2 | WEIGHT: 213.3 LBS | HEIGHT: 59 IN

## 2021-06-01 VITALS — WEIGHT: 215.7 LBS | HEIGHT: 59 IN | BODY MASS INDEX: 43.48 KG/M2

## 2021-06-01 VITALS — BODY MASS INDEX: 43.37 KG/M2 | WEIGHT: 215.12 LBS | HEIGHT: 59 IN

## 2021-06-01 NOTE — TELEPHONE ENCOUNTER
Called to reschedule no show FU DM appt with Dr Coppola on 08/30/2019 had to leave . Please assistin rescheduling when patient calls back.

## 2021-06-03 VITALS — HEIGHT: 59 IN | WEIGHT: 188.38 LBS | BODY MASS INDEX: 37.98 KG/M2

## 2021-06-03 VITALS — WEIGHT: 188 LBS | HEIGHT: 59 IN | BODY MASS INDEX: 37.9 KG/M2

## 2021-06-04 VITALS
BODY MASS INDEX: 38.91 KG/M2 | OXYGEN SATURATION: 97 % | DIASTOLIC BLOOD PRESSURE: 84 MMHG | WEIGHT: 193 LBS | HEIGHT: 59 IN | TEMPERATURE: 98.2 F | SYSTOLIC BLOOD PRESSURE: 130 MMHG | HEART RATE: 84 BPM

## 2021-06-05 NOTE — TELEPHONE ENCOUNTER
Will discuss again when she comes in for follow up.    Dr. Juan David Coppola  1/23/2020 7:35 AM

## 2021-06-05 NOTE — TELEPHONE ENCOUNTER
Called patient with the help of Deangelo and help schedule patient for Diabetes patient has not been seen since July 2019 patient agree mother has upcoming appointment and would like to be schedule same day Thursday January 16, 2020 at 12:40 pm

## 2021-06-05 NOTE — PROGRESS NOTES
"ASSESMENT AND PLAN:  Diagnoses and all orders for this visit:    Type 2 diabetes mellitus treated without insulin (H)  -     Glycosylated Hemoglobin A1c  -     Ambulatory referral to Ophthalmology  -     Comprehensive Metabolic Panel  -     Lipid Cascade  A1c 10.9 today.  Took herself off of metformin several months ago.  Restarted metformin.  She will follow-up with our MTM pharmacist in a few weeks.  Instructed to bring meter or blood sugar log to every visit.  Discussed importance of good glycemic control, complications from uncontrolled diabetes.    Hyperlipidemia, unspecified hyperlipidemia type  Restarted metformin today.  Restart lipid medication at next visit.    Noncompliance with medication regimen  -     Ambulatory referral to Care Management (Primary Care)  We will plan frequent follow-up.    Immunization due  -     Cancel: Pneumococcal polysaccharide vaccine 23-valent 3 yo or older, subq/IM  Declined pneumonia vaccine today.  Will discuss again at next visit.      Spent 25 min face to face with patient with more the 50% spent in counseling and coordination of care and discussing problems listed above.    SUBJECTIVE: Carlos Oliveros is a 29-year-old female with history of diabetes, hyperlipidemia and medication noncompliance here for follow-up.  She was on metformin in the past, was switched to insulin during her pregnancy and restarted on metformin.  She took herself off of all medications since June 2019.  She is not checking her blood sugar at all.  States\" I am doing well\" today.  She did not want to take her medication stating \" it was too difficult to swallow\".     Past Medical History:   Diagnosis Date     Diabetes mellitus (H)     IDGDM vs Type 2     Hypertension     gestational hypertension     Patient Active Problem List   Diagnosis     Type 2 diabetes mellitus treated without insulin (H)     Hyperlipidemia     Pregnancy with type 2 diabetes mellitus in second trimester     Severe pre-eclampsia     " "Gestational hypertension     Breech presentation       Allergies:    Allergies   Allergen Reactions     No Known Drug Allergies        Social History     Tobacco Use   Smoking Status Never Smoker   Smokeless Tobacco Never Used       Review of systems otherwise negative except as listed in HPI.   Social History     Tobacco Use   Smoking Status Never Smoker   Smokeless Tobacco Never Used       OBJECTICE: /84 (Patient Site: Right Arm, Patient Position: Sitting, Cuff Size: Adult Regular)   Pulse 84   Temp 98.2  F (36.8  C) (Oral)   Ht 4' 11\" (1.499 m)   Wt 193 lb (87.5 kg)   LMP 01/15/2020 (Exact Date)   SpO2 97%   BMI 38.98 kg/m      DATA REVIEWED:    Labs Reviewed or Ordered (1):       GEN-alert,  in no apparent distress.  HEENT-mucous membranes are moist, neck is supple.  CV-regular rate and rhythm with no murmur.   RESP-lungs clear to auscultation .  ABDOMEN- Soft , not tender.  EXTREM- No open lesions or ulcers. Sensation intact.   SKIN-normal        Worland Za   1/16/2020   "

## 2021-06-05 NOTE — TELEPHONE ENCOUNTER
Restarted med this morning. No additional action needed    Dr. Juan David Coppola  1/16/2020 3:28 PM

## 2021-06-05 NOTE — PROGRESS NOTES
Potential [facility] Enrollment Outreach Call: Attempt 2    Community Health Worker called and left a message for the patient.  If the patient is returning my call, please transfer the patient to Katharine at ext. [phone].       Patient has been mailed a unreachable letter and was provided with CHW contact information if they are interested in accessing Clinic Care Coordination.      Order for Care Management has been closed, no further outreach will be done at this time and patient can be re-referred.

## 2021-06-09 NOTE — PROGRESS NOTES
ASSESMENT AND PLAN:    Refugee Screening - Reviewed overseas paperwork.  Confirmed pre-departure anti-parasite treatment.  Reviewed refugee screening labs.  Immunization review and update done, see flowsheet.  Reviewed healthy lifestyle issues and resettlement issues.  Encouraged dental follow-up in coordination with [facility].  Discussed age appropriate cancer screening but no invasive exam or testing done on initial visit with new arrival refugee patients.    -     Varicella vaccine subcutaneous  -     Influenza, Seasonal Quad, Preservative Free 36+ Months    Type II diabetes mellitus-new diagnosis  -     Glycosylated Hemoglobin A1c done today confirms diagnosis.  -     Thyroid Cascade  We discussed treatment options.  At this point, she has a lot of room to improve her diet and exercise routine.  Therefore, she does not want to start on medication right away.  She is going to make aggressive changes in her diet and lifestyle and report back in 3 months for her next A1c, if she is showing good improvement will continue with that plan, if not we'll introduce metformin.  Counseled the patient on this as being a lifelong diagnosis and she will need follow-up initially every 3 months.    Hypercholesteremia  Counseled on The lifestyle changes as above.    Obesity  -     Thyroid Cascade  The following high BMI interventions were performed this visit: encouragement to exercise and prescribed dietary intake    Birth control  -     Pregnancy (Beta-hCG, Qual), Urine done today is negative.  Discussed birth control options with the patient and her .  The  is interested in perhaps pursuing vasectomy as a long-term option.  Will cover the short-term with Depo-Provera as noted below.  -     medroxyPROGESTERone injection 150 mg (DEPO-PROVERA); Inject 1 mL (150 mg total) into the shoulder, thigh, or buttocks once.          HPI: 26-year-old female here for refugee screening.  She denies any major medical history.  She is  ", has 4 children, recent [place] refugee arrivals to [place].  Patient is still breast-feeding her youngest child at age 1.  She has had some darkening of patches of her skin over the last several months.  She has not noted any polyuria or polydipsia.  She currently is not on birth control, in the past, she was on Depo-Provera and tolerated it well.  However, she did have some weight gain with this.  She currently has 4 children and is not interested in getting pregnant again anytime soon.  She might even be interested in permanent birth control.    ROS: No chest pain, no shortness of breath, no blood in the urine or blood in the stool, remainder review of systems is as above or negative.    Family medical history: Her mother has diabetes.    Social History     Social History     Marital status:      Spouse name: N/A     Number of children: N/A     Years of education: N/A     Social History Main Topics     Smoking status: Never Smoker     Smokeless tobacco: Never Used     Alcohol use No     Drug use: No     Sexual activity: Yes     Other Topics Concern     None     Social History Narrative     None       OBJECTICE: /76 (Patient Site: Right Arm, Patient Position: Sitting, Cuff Size: Adult Regular)  Pulse 82  Temp 98.9  F (37.2  C) (Oral)   Resp 20  Ht 4' 11\" (1.499 m)  Wt 203 lb 8 oz (92.3 kg)  LMP 03/16/2017  BMI 41.1 kg/m2   Gen - alert, orientated, NAD  Eyes - fundascopic exam limited by the undialated pupil but looks symmetric  ENT - oropharynx clear, TMs clear  Neck - supple, no palpable mass or lymphadenopathy  CV - RRR, no murmur  Breast - deferred  Resp - lungs CTA  Ab - soft, nontender, no palpable mass or organomegaly   - deferred  Extrem - warm, no edema  Neuro - CN II-XII intact, strength, sensation, reflexes intact and symmetric  Skin -acantholysis nigricans .  No atypical appearing lesions seen.       Recent Results (from the past 672 hour(s))   Schistosoma Species Antibody, " IgG    Collection Time: 03/10/17  9:49 AM   Result Value Ref Range    Schistosoma Ab, IgG, S Negative    Strongyloides Antibody, IgG,S    Collection Time: 03/10/17  9:49 AM   Result Value Ref Range    Strongyloides Ab, IgG, S Negative Negative   Varicella Zoster Immune Status Antibody, IgG    Collection Time: 03/10/17  9:49 AM   Result Value Ref Range    Varicella Zoster Immune Status Equivocal (!) Immune   Comprehensive Metabolic Panel    Collection Time: 03/10/17  9:49 AM   Result Value Ref Range    Sodium 138 136 - 145 mmol/L    Potassium 4.4 3.5 - 5.0 mmol/L    Chloride 103 98 - 107 mmol/L    CO2 26 22 - 31 mmol/L    Anion Gap, Calculation 9 5 - 18 mmol/L    Glucose 322 (H) 70 - 125 mg/dL    BUN 12 8 - 22 mg/dL    Creatinine 0.85 0.60 - 1.10 mg/dL    GFR MDRD Af Amer >60 >60 mL/min/1.73m2    GFR MDRD Non Af Amer >60 >60 mL/min/1.73m2    Bilirubin, Total 0.5 0.0 - 1.0 mg/dL    Calcium 9.5 8.5 - 10.5 mg/dL    Protein, Total 7.8 6.0 - 8.0 g/dL    Albumin 3.8 3.5 - 5.0 g/dL    Alkaline Phosphatase 63 45 - 120 U/L    AST 26 0 - 40 U/L    ALT 54 (H) 0 - 45 U/L   LDL Cholesterol, Direct    Collection Time: 03/10/17  9:49 AM   Result Value Ref Range    Direct  (H) <=129 mg/dl   HIV Antigen/Antibody Screening Cascade    Collection Time: 03/10/17  9:49 AM   Result Value Ref Range    HIV Antigen / Antibody Negative Negative   Hepatitis A Immune Status    Collection Time: 03/10/17  9:49 AM   Result Value Ref Range    Hepatitis A IgG (Immune Status) Positive Positive   Hepatitis C Antibody (Anti-HCV)    Collection Time: 03/10/17  9:49 AM   Result Value Ref Range    Hepatitis C Ab Negative Negative   Hepatitis B Surface Antigen (HBsAG)    Collection Time: 03/10/17  9:49 AM   Result Value Ref Range    Hepatitis B Surface Ag Negative Negative   Hepatitis B Surface Antibody (Anti-HBs)    Collection Time: 03/10/17  9:49 AM   Result Value Ref Range    Hep B Surface Antibody Positive (!) Negative   Hepatitis B Core Antibody  (Anti-HBc)    Collection Time: 03/10/17  9:49 AM   Result Value Ref Range    Hep B Core Total Ab Negative Negative   QTF-Mycobacterium tuberculosis by QuantiFERON-TB Gold    Collection Time: 03/10/17  9:49 AM   Result Value Ref Range    QTF RESULT Negative Negative    QTF INTREPRETATION       No interferon-gamma response to M. tuberculosis antigens was detected.  Infecton with M. tuberculosis is unlikely.  A negative result alone does not exclude infection with M. tuberculosis    QTF NIL 0.11 IU/mL    QTF TB ANTIGEN - NIL 0.12 IU/mL    QTF MITOGEN - NIL >10.00 IU/mL   HM1 (CBC with Diff)    Collection Time: 03/10/17  9:49 AM   Result Value Ref Range    WBC 12.0 (H) 4.0 - 11.0 thou/uL    RBC 7.05 (H) 3.80 - 5.40 mill/uL    Hemoglobin 11.7 (L) 12.0 - 16.0 g/dL    Hematocrit 40.2 35.0 - 47.0 %    MCV 57 (L) 80 - 100 fL    MCH 16.6 (L) 27.0 - 34.0 pg    MCHC 29.1 (L) 32.0 - 36.0 g/dL    RDW 20.6 (H) 11.0 - 14.5 %    Platelets 310 140 - 440 thou/uL    MPV  8.5 - 12.5 fL    Neutrophils % 66 50 - 70 %    Lymphocytes % 27 20 - 40 %    Monocytes % 6 2 - 10 %    Eosinophils % 2 0 - 6 %    Basophils % 0 0 - 2 %    Neutrophils Absolute 7.9 (H) 2.0 - 7.7 thou/uL    Lymphocytes Absolute 3.2 0.8 - 4.4 thou/uL    Monocytes Absolute 0.7 0.0 - 0.9 thou/uL    Eosinophils Absolute 0.2 0.0 - 0.4 thou/uL    Basophils Absolute 0.1 0.0 - 0.2 thou/uL   Manual Differential    Collection Time: 03/10/17  9:49 AM   Result Value Ref Range    Platelet Estimate Normal Normal    Polychromasia 1+ (!) Negative    Target Cells 1+ (!) Negative   Glycosylated Hemoglobin A1c    Collection Time: 03/23/17  3:18 PM   Result Value Ref Range    Hemoglobin A1c 8.1 (H) 3.5 - 6.0 %   Pregnancy (Beta-hCG, Qual), Urine    Collection Time: 03/23/17  3:18 PM   Result Value Ref Range    Pregnancy Test, Urine Negative Negative    Specific Gravity, UA 1.025 1.001 - 1.030         Mental Health screening:  Not done today given the multiple medical issues addressed as  above.    Damian ROMAN McClave   6:01 PM 3/23/2017

## 2021-06-10 NOTE — PROGRESS NOTES
ASSESSMENT AND PLAN:  1. Encounter for pre-employment examination     She is scheduled to become a PCA.  She has already had a Quant interferon test drawn green card/refugee exam.  Test result was negative.  Given the results.          CHIEF COMPLAINT:  Chief Complaint   Patient presents with     TB     test for PCA       HISTORY OF PRESENT ILLNESS:  Carlos is a 26 y.o. female presenting for a TB test. Carlos is present with a Kellie . She not been previously treated for TB. She has not had a positive mantoux test. She is not sure if she received a BCG vaccine. She denies night sweats, sudden weight loss or persistent cough. Of note, a TB Quant test was done on 3/16/2017 which was negative.     REVIEW OF SYSTEMS:   She denies fever.    All other 10 point review of systems are negative.    PFSH:  Reviewed as below.     TOBACCO USE:  History   Smoking Status     Never Smoker   Smokeless Tobacco     Never Used       VITALS:  Vitals:    04/13/17 1451   BP: 132/88   Patient Site: Left Arm   Patient Position: Sitting   Cuff Size: Adult Regular   Pulse: 80   Resp: 20   Temp: 98.9  F (37.2  C)   TempSrc: Oral   Weight: 198 lb 6 oz (90 kg)     Wt Readings from Last 3 Encounters:   04/13/17 198 lb 6 oz (90 kg)   03/23/17 203 lb 8 oz (92.3 kg)     Body mass index is 40.07 kg/(m^2).    PHYSICAL EXAM:  General: Alert, cooperative, no distress, appears stated age    DATA REVIEWED:  Additional History from Old Records Summarized (2): None  Decision to Obtain Records (1): None  Radiology Tests Summarized or Ordered (1): None  Labs Reviewed or Ordered (1): TB Quant from 3/16/2017 reviewed.   Medicine Test Summarized or Ordered (1): None  Independent Review of EKG or X-RAY(2 each): None    The visit lasted a total of 4 minutes face to face with the patient. Over 50% of the time was spent counseling and educating the patient about TB test.     Levy MADDOX, am scribing for and in the presence of, Dr. Craft.    Dr. Venancio MADDOX,  personally performed the services described in this documentation, as scribed by Levy Jhaveri in my presence, and it is both accurate and complete.      MEDICATIONS:  No current outpatient prescriptions on file.     No current facility-administered medications for this visit.        Total Data Points: 1

## 2021-06-11 NOTE — PROGRESS NOTES
"ASSESMENT AND PLAN:  Diagnoses and all orders for this visit:    Type 2 diabetes mellitus treated without insulin  -     Glycosylated Hemoglobin A1c  -     Microalbumin, Random Urine  -     blood glucose test strips; Use 1 test strip with your Verio meter to check blood sugar 2 times daily  Dispense: 300 strip; Refill: 3  -     Discontinue: blood-glucose meter (ONETOUCH VERIO SYSTEM) Misc; Use the Verio meter as directed to check blood sugar  Dispense: 1 each; Refill: 0  -     lancets 30 gauge Misc; Use one lancet with your Verio meter to check blood sugar twice a day  Dispense: 300 each; Refill: 3  -     blood-glucose meter (ONETOUCH VERIO SYSTEM) Misc; Use the Verio meter as directed to check blood sugar  Dispense: 1 each; Refill: 0  -     metFORMIN (GLUCOPHAGE XR) 500 MG 24 hr tablet; Take 1 tablet (500 mg total) by mouth daily with supper.  Dispense: 30 tablet; Refill: 3  -     Basic Metabolic Panel  -     Ambulatory referral to Ophthalmology  A1c 8.1 three months ago.   Started metformin today. SE discussed.  Knows how to check BS at home, advised to start checking BS twice a day. F/U in 6 weeks with BS log.     Contraceptive education  -     Pregnancy (Beta-hCG, Qual), Urine  -     norgestimate-ethinyl estradiol (ORTHO-CYCLEN) 0.25-35 mg-mcg per tablet; Take 1 tablet by mouth daily.  Dispense: 28 tablet; Refill: 11    Immunization due  -     Varicella vaccine subq        SUBJECTIVE: Carlos Oliveros here to discuss birth control options.   On depo currently , last dose  On 3/23/17. Feels \" heavy \" on depo. Was on pills in the past, wants to go back to pills.   Seen by provider here in 3/17 for refugee exam,A1c was  8.1, not on med, trying diet and exercise. Mom is also diabetic.   Never been on med for diabetes. States she goes to diabetic education classes with mom and knows how to check BS, know portion control and diabetic diet. Denied hypoglycemic symptoms.       No past medical history on file.  Patient Active " "Problem List   Diagnosis     Type 2 diabetes mellitus treated without insulin       Allergies:    Allergies   Allergen Reactions     No Known Drug Allergies        History   Smoking Status     Never Smoker   Smokeless Tobacco     Never Used       Review of systems otherwise negative except as listed in HPI.   History   Smoking Status     Never Smoker   Smokeless Tobacco     Never Used       OBJECTICE: /86 (Patient Site: Left Arm, Patient Position: Sitting, Cuff Size: Adult Large)  Pulse 72  Temp 97.7  F (36.5  C) (Oral)   Resp 16  Ht 4' 11\" (1.499 m)  Wt 196 lb (88.9 kg)  LMP 06/05/2017 (Within Days)  BMI 39.59 kg/m2    DATA REVIEWED:    Labs Reviewed or Ordered (1):      GEN-alert,  in no apparent distress.  HEENT-mucous membranes are moist, neck is supple.  CV-regular rate and rhythm with no murmur.   RESP-lungs clear to auscultation .  ABDOMEN- Soft , not tender.  EXTREM- Sensation intact. No open lesions or ulcers.         Juan David Coppola   6/29/2017   "

## 2021-06-11 NOTE — PROGRESS NOTES
Patient came in on the nurse only schedule for depo, when I brought her back she no longer wishes to continue getting the depo shot. She would like to change her method of birth control. I gave her the passport to make an appt to see her PCP for a birth consult OV.

## 2021-06-13 NOTE — TELEPHONE ENCOUNTER
Pt decline appointment. I offer appointment same day appointment  as family member since she was coming with.  Patient decline for diabetes check.

## 2021-06-13 NOTE — PROGRESS NOTES
"ASSESMENT AND PLAN:  Diagnoses and all orders for this visit:    Type 2 diabetes mellitus treated without insulin  -     Glycosylated Hemoglobin A1c  -     Comprehensive Metabolic Panel  -     Microalbumin, Random Urine  -     metFORMIN (GLUCOPHAGE) 1000 MG tablet; Take 1 tablet (1,000 mg total) by mouth 2 (two) times a day with meals.  Dispense: 60 tablet; Refill: 11  Increased metformin to 1000 mg twice daily (she is taking 500 mg).  She will likely need additional medication and/or insulin.Discussed with the patient.  Discussed referral for diabetic education, deferred per patient at this time.  We will discussed again.  Follow-up in 4 weeks with blood sugar log.  Instructed to check blood sugar at least once a day, fasting.    Need for influenza vaccination  -     Influenza, Seasonal,Quad Inj, 36+ MOS        SUBJECTIVE: Carlos Oliveros is here to follow-up on diabetes.  She was started on metformin about 3 months ago.  Her A1c was 13.1 at that time.  She states she has not been able to take her medications daily,states \"keeps forgetting to take it\".  She is taking care of her parents with chronic medical conditions.  Mother also has diabetes.  She has meter but she has not been checking her blood sugar.   She denied hypoglycemic symptoms since last visit.  Denied abdominal pain, nausea or dizziness.  No acute fever or URI symptoms.  No chest pain shortness of breath or palpitations.  No headaches.    No past medical history on file.  Patient Active Problem List   Diagnosis     Type 2 diabetes mellitus treated without insulin       Allergies:    Allergies   Allergen Reactions     No Known Drug Allergies        History   Smoking Status     Never Smoker   Smokeless Tobacco     Never Used       Review of systems otherwise negative except as listed in HPI.   History   Smoking Status     Never Smoker   Smokeless Tobacco     Never Used       OBJECTICE: /78 (Patient Site: Left Arm, Patient Position: Sitting, Cuff Size: " "Adult Large)  Pulse 76  Temp 98.2  F (36.8  C) (Oral)   Resp 20  Ht 4' 11\" (1.499 m)  Wt 199 lb 8 oz (90.5 kg)  LMP 08/20/2017 (Within Weeks)  BMI 40.29 kg/m2    DATA REVIEWED:    Labs Reviewed or Ordered (1):       GEN-alert,  in no apparent distress.  HEENT-mucous membranes are moist, neck is supple.  CV-regular rate and rhythm with no murmur.   RESP-lungs clear to auscultation .  ABDOMEN- Soft , not tender.  EXTREM-normal sensation.  No open lesions or ulcers.  SKIN-normal        [facility]   10/20/2017   This transcription uses voice recognition software, which may contain typographical errors.      "

## 2021-06-14 NOTE — PROGRESS NOTES
ASSESMENT AND PLAN:  Diagnoses and all orders for this visit:    Screening for lipid disorders  -     Lipid Cascade    Type 2 diabetes mellitus treated without insulin  -     glyBURIDE (DIABETA) 5 MG tablet; Take 1 tablet (5 mg total) by mouth 2 (two) times a day with meals.  Dispense: 60 tablet; Refill: 5  -     blood glucose test strips; Use 1 test strip with your Verio meter to check blood sugar 2 times daily  Dispense: 300 strip; Refill: 3  -     lancets 30 gauge Misc; Use one lancet with your Verio meter to check blood sugar twice a day  Dispense: 300 each; Refill: 3  -     Ambulatory referral to Diabetic Education  -     Basic Metabolic Panel  Continue metformin thousand milligrams twice daily.  Added glyburide 5 mg twice daily.  Follow-up in 6 weeks with blood sugar log.      Spent 25 min face to face with patient with more the 50% spent in counseling  discussing problems listed above.        SUBJECTIVE: Carlos Oliveros  is here to follow-up on diabetes. Metformin was increased to 1000 mg BID a few weeks ago, tolerating well.Fasting  BS reading are in the low 200s most days, the lowest was 153 and the highest with 303.  No low blood sugar with hypoglycemic symptoms. Her A1c was 10.8 one month ago ( was 13. 1 in 6/17 ). She states she has been taking her medications daily as prescribed since last visit.  She is taking care of her parents with chronic medical conditions.  Mother also has diabetes.  Denied abdominal pain, nausea or dizziness.  No acute fever or URI symptoms.  No chest pain shortness of breath or palpitations.  No headaches.    No past medical history on file.  Patient Active Problem List   Diagnosis     Type 2 diabetes mellitus treated without insulin       Allergies:    Allergies   Allergen Reactions     No Known Drug Allergies        History   Smoking Status     Never Smoker   Smokeless Tobacco     Never Used       Review of systems otherwise negative except as listed in HPI.   History   Smoking  "Status     Never Smoker   Smokeless Tobacco     Never Used       OBJECTICE: /76 (Patient Site: Left Arm, Patient Position: Sitting, Cuff Size: Adult Large)  Pulse 84  Temp 98.2  F (36.8  C) (Oral)   Resp 20  Ht 4' 11\" (1.499 m)  Wt 200 lb 4 oz (90.8 kg)  LMP 11/06/2017  BMI 40.45 kg/m2    DATA REVIEWED:    Labs Reviewed or Ordered (1):       GEN-alert,  in no apparent distress.  HEENT-mucous membranes are moist, neck is supple.  CV-regular rate and rhythm with no murmur.   RESP-lungs clear to auscultation .  ABDOMEN- Soft , not tender.  EXTREM- Sensation intact. No open lesions or ulcers.   SKIN-normal        [facility]   11/15/2017   This transcription uses voice recognition software, which may contain typographical errors.      "

## 2021-06-14 NOTE — PROGRESS NOTES
ASSESMENT AND PLAN:  Diagnoses and all orders for this visit:    Absence of menstruation  -     Pregnancy (Beta-hCG, Qual), Urine    High-risk pregnancy  -     Ambulatory referral to Obstetrics / Gynecology    Type 2 diabetes mellitus treated without insulin  A1c  10.8 2 months ago. Continue metformin 1000 BID.  ??Add insulin. Pending OB evaluation.   Stopped pravastatin, advised not to restart.           SUBJECTIVE: Carlos Oliveros is here for pregnancy confirmation.  , youngest is 5 yo.  LMP 17 ( estimate , early September ). Normal menstrual periods prior to Sept.  All normal deliveries, all born in [facility].   Has h/o HTN with one pregnancy ( unable to recall which pregnancy ), No h/o preeclampsia.  Has h/o DM on metformin and glipizide but only taking Metformin now , currently on 1000 mg BID.  Stopped taking pravastatin 2 weeks ago.  No cramping or bleeding.  No other concerns.       No past medical history on file.  Patient Active Problem List   Diagnosis     Type 2 diabetes mellitus treated without insulin     Hyperlipidemia       Allergies:    Allergies   Allergen Reactions     No Known Drug Allergies        History   Smoking Status     Never Smoker   Smokeless Tobacco     Never Used       Review of systems otherwise negative except as listed in HPI.   History   Smoking Status     Never Smoker   Smokeless Tobacco     Never Used       OBJECTICE: /88 (Patient Site: Right Arm, Patient Position: Sitting, Cuff Size: Adult Regular)  Pulse 81  Temp 98.4  F (36.9  C) (Oral)   Wt 206 lb 7 oz (93.6 kg)  SpO2 98%  BMI 41.7 kg/m2    DATA REVIEWED:    Labs Reviewed or Ordered (1):       GEN-alert,  in no apparent distress.  HEENT-mucous membranes are moist, neck is supple.  CV-regular rate and rhythm with no murmur.   RESP-lungs clear to auscultation .          Juan David Coppola   2017

## 2021-06-15 NOTE — PROGRESS NOTES
Please have [facility] DM ED reschedule this pt.  Correction - Dr. Coppola is NOT an OB provider and pt has been referred to [facility] Dr. Diane for care for high risk pregnancy.  Thanks.

## 2021-06-15 NOTE — PROGRESS NOTES
Can you please call the pt to reschedule apt with diabetic educator at [place]? ( see note from RN )  Critical for her keep blood sugar under control while pregnant.   She sees Ob/GYN for prenatal care

## 2021-06-15 NOTE — PROGRESS NOTES
[facility] GDM Care Plan    Assessment: pt seen for initial appt.  is present as well as a family member who states she had GDM recently as well. Pt has T2 DM and is currently pregnant. Last A1c was 10/20/17 at 10.8%. Pt reports she was diagnosed with  DM in April or May of 2017. She is currently taking metformin 1g BID. She has not checked BG in about 1 month because she ran out of lancets.   Provided pt with new attila meter today. She was able to check BG w/o difficulty. BG was 206 mg/dl about 2 hours after meal.   Pt typically eats 2-3 meals/day which consist of about 2 cups of rice with meat and veggies. She drinks water and coffee.   Discussed BG goals during pregnancy.   Instructed pt to start checking BG 4x/day - FBG and 1 hour pp.   Discussed high BG with Tanja Balderas NP. Per Tanja, will start on 4 units of Novolog prior to meals and 12 units of levemir at HS. Pt was able to demo insulin pens w/o difficulty.   Did not discuss titration as this may be too much for her to remember.   Reviewed signs and symptoms of hypoglycemia and treatment.   Pt will f/u on Monday, will get scheduled into pregnancy clinic as well.     Plan:check BG QID as instructed. Novolog 4 units prior to meals and 12 units of levemir at HS.     Provider: Dr. Juan David Coppola   Provider's Diagnosis (per referral form): T2 DM in pregnancy   Weight: 213 lb 9.6 oz (96.9 kg)  EDC: Estimated Date of Delivery: 6/20/18  Pregnancy #: 5  Previous GDM: No  Medications:   Current Outpatient Prescriptions:      blood glucose test (CONTOUR NEXT STRIPS) strips, Use 1 each As Directed 4 (four) times a day., Disp: 150 strip, Rfl: 6     blood-glucose meter (ONETOUCH VERIO SYSTEM) Misc, Use the Verio meter as directed to check blood sugar, Disp: 1 each, Rfl: 0     generic lancets, Use 1 each As Directed 4 (four) times a day., Disp: 100 each, Rfl: 6     glipiZIDE (GLUCOTROL) 5 MG tablet, Take 1 tablet (5 mg total) by mouth 2 (two) times a day before meals. 1/2  "Hour BEFORE meals, Disp: 60 tablet, Rfl: 6     insulin aspart (NOVOLOG FLEXPEN) 100 unit/mL injection pen, Inject 4 Units under the skin 3 (three) times a day before meals., Disp: 15 mL, Rfl: 1     insulin detemir (LEVEMIR FLEXTOUCH) 100 unit/mL (3 mL) pen, Inject 12 Units under the skin daily. At bedtime, Disp: 15 mL, Rfl: 1     metFORMIN (GLUCOPHAGE) 1000 MG tablet, Take 1 tablet (1,000 mg total) by mouth 2 (two) times a day with meals., Disp: 60 tablet, Rfl: 11     norgestimate-ethinyl estradiol (ORTHO-CYCLEN) 0.25-35 mg-mcg per tablet, Take 1 tablet by mouth daily., Disp: 28 tablet, Rfl: 11     pen needle, diabetic (BD ULTRA-FINE MARY PEN NEEDLES) 32 gauge x 5/32\" Ndle, Use 1 each As Directed 4 (four) times a day., Disp: 100 each, Rfl: 6     pravastatin (PRAVACHOL) 20 MG tablet, Take 1 tablet (20 mg total) by mouth at bedtime., Disp: 30 tablet, Rfl: 5    BG monitoring goals: Fasting <95; 1 hour post start of meal <140. Test 4 x per day.  Check fasting a.m. ketones: Yes  GDM meal pattern/carb counting taught per guidelines: Yes  Goals: Nutrition: GDM meal plan  Activity:  Walking after meals when able/staying active  Monitoring:  BG 4x/day as directed, ketones every morning    F/u in 1 week to assess BG and food log     DIABETES CARE PLAN AND EDUCATION RECORD    Gestational Diabetes Disease Process/Preconception Care/Management During Pregnancy/Postpartum:    Meter (per above goals): Discussed, Literature provided and Patient returned demonstration    Nutrition Management    Weight: Assessed, Discussed   Portions/Balance: Assessed, Discussed   Carb ID/Count: Assessed, Discussed  Label Reading: Assessed, Discussed   Menu Planning: Assessed, Discussed   Dining Out: Assessed, Discussed  Physical Activity: Discussed     Acute Complications: Prevent, Detect, Treat:    Goal Setting and Problem Solving: Assessed and Discussed  Barriers: Assessed and Discussed  Psychosocial Adjustments: Assessed and Discussed      Time: " 60  Visit Type: GDM individual   Visit #: 1    I agree with the aforementioned diabetes plan.  Juana MCCORMICK [facility]  1/16/2018  1:41 PM

## 2021-06-15 NOTE — PROGRESS NOTES
Pt did not show for pregnancy clinic appt today. Will have scheduling contact pt to reschedule.

## 2021-06-15 NOTE — PROGRESS NOTES
Pt did not show for DM appt today. Will have scheduling contact pt to reschedule.   T2DM in pregnancy.

## 2021-06-15 NOTE — PROGRESS NOTES
Again, no show today.   Has not been seen since 1/16/18 - at that time insulin was started. A1c on 10/20/17 was 10.8%. Will inform OB Dr. Juan David Coppola.   Will have scheduling reach out to pt to get rescheduled.

## 2021-06-16 PROBLEM — O24.112 PREGNANCY WITH TYPE 2 DIABETES MELLITUS IN SECOND TRIMESTER: Status: ACTIVE | Noted: 2018-02-24

## 2021-06-16 PROBLEM — E78.5 HYPERLIPIDEMIA: Status: ACTIVE | Noted: 2017-11-15

## 2021-06-16 PROBLEM — O14.10 SEVERE PRE-ECLAMPSIA: Status: ACTIVE | Noted: 2018-06-14

## 2021-06-16 PROBLEM — O13.9 GESTATIONAL HYPERTENSION: Status: ACTIVE | Noted: 2018-06-14

## 2021-06-16 NOTE — PROGRESS NOTES
Carlos Desai thought she also received a SMRT application in the mail, but when we opened it, it had her name on the outside and her parents appl. on the inside. Parents each received their own copies and SW has helped both of them complete and mail the SMRT appls. to [facility].

## 2021-06-16 NOTE — PROGRESS NOTES
[facility]  ENDOCRINOLOGY    Gestational Diabetes 3/18/2018    Carlos Oliveros, 1990, [id number]          Reason for visit      Diabetes Mellitus Type 2 in Pregnancy    HPI     Carlos Oliveros is a very pleasant 27 y.o. old female who presents for management of Diabetes Mellitus during pregnancy.  She is currently 26w1d pregnant . Due date is 18    Current carbohydrate intake:consistent with recommendations of 30g-60g-60g.  I have reviewed her blood glucose logs and note that the:  Fasting readings  are:above range on current regimen  Postprandial readings are:in range on current regimen  Current NPH dose: 22 u  Current Prandial insulin:   Blood glucose logs/meter brought in and data reviewed and incorporated into decision-making.  [facility]     Therapy/Interventions in the past:  She has been seen by the Diabetes Educator- and has received instruction on carbohydrate counting and  consistency.  Records from referring provider and other sources have also been reviewed and incorporated into decision-making.      TODAY:  Carlos returns today in f/u for DM management during pregnancy.  She is here with a professional . She has brought in her log book with her and her FBS are elevated, and likely all of her pp readings. She notes she is now eating 2-3 meals/day, however only checking after lunch, she is taking insulin with all the meals. We will increase all of her insulin dosages.  She has had 2 episodes of hypoglycemia which she was able to correct without difficulty.  Baby is moving appropriately and she is having no swelling.  She will see her OB on the .    Past Medical History       Patient Active Problem List   Diagnosis     Type 2 diabetes mellitus treated without insulin     Hyperlipidemia     Pregnancy with type 2 diabetes mellitus in second trimester        Past Surgical History     No past surgical history on file.    Family History     No family history  "on file.    Social History     Social History   Substance Use Topics     Smoking status: Never Smoker     Smokeless tobacco: Never Used     Alcohol use No       Review of Systems     Patient has no polyuria or polydipsia, no chest pain, dyspnea or TIA's, no numbness, tingling or pain in extremities  Remainder negative except as noted in HPI.      Vital Signs     /72 (Patient Site: Right Arm, Patient Position: Sitting, Cuff Size: Adult Regular)  Pulse 100  Ht 4' 11\" (1.499 m)  Wt 218 lb 12.8 oz (99.2 kg)  BMI 44.19 kg/m2  Wt Readings from Last 3 Encounters:   03/15/18 218 lb 12.8 oz (99.2 kg)   03/08/18 215 lb 1.9 oz (97.6 kg)   03/01/18 215 lb 11.2 oz (97.8 kg)       Physical Exam     GENERAL: Pleasant, alert, appropriate appearance for age. No acute distress,   HEENT: Normocephalic, atraumatic  NECK: Supple, no masses or  lymph nodes.  THYROID: No nodules or enlargement. Non-tender, no bruit  CHEST/RESPIRATORY: Normal chest wall and respirations. Clear to auscultation.  CARDIOVASCULAR: Regular rate and rhythm. S1, S2, no murmur, click, gallop, or rubs.  ABDOMEN: Gravid   LYMPHATIC: No palpable nodes in neck, supraclavicular,  SKIN: No melanosis,  ecchymosis,  vitiligo. No acanthosis nigricans  NEURO:  Non-focal, normal DTRs; no tremor.  PSYCH: Alert and oriented -appropriate affect. Orientation, judgement and memory appear intact.  MSK: No joint abnormalities, FROM in all four extremities. No kyphosis    Assessment     Diabetes Type 2 in Pregnancy    Plan       1. GESTATIONAL DIABETES-  Adjust dose as follows:    -NPH [id number]   units. Increase by 2 units every 2 days to keep fasting blood glucose below 95mg/dL  -Novolog 10  units with breakfast  -Novolog 10 units with lunch   -Novolog 10 units with dinner  -Increase by 2 units every 2 days to keep 1 hour after meal blood glucose less than 140mg/dL    We reviewed glucose goals of fasting blood glucose <95 mg/dL and 1 hour post prandial blood glucose of " <140 mg/dL.    Monitor blood sugar 4 times daily: Fasting  and 1 hour after each meal.  Contact  this clinic [phone] if blood glucose is not within the above-mentioned goals.     We discussed the importance of excellent glycemic control during pregnancy to limit complications such as fetal macrosomia, shoulder dystocia,  hypoglycemia and hyperbilirubinemia.  I have discussed the patient's increased risk of recurrent GDM and/or development of type 2 diabetes later in life.        Pt will f/u in 1 week.  Time spent with pt today: 25 min with >50% spent in counseling and coordination of care.      Juana Balderas  3/18/2018      Lab Results     Hemoglobin A1c   Date Value Ref Range Status   02/15/2018 8.0 (H) 3.5 - 6.0 % Final   10/20/2017 10.8 (H) 3.5 - 6.0 % Final   2017 13.1 (H) 4.2 - 6.1 % Final   2017 8.1 (H) 3.5 - 6.0 % Final     Creatinine   Date Value Ref Range Status   11/15/2017 0.70 0.60 - 1.10 mg/dL Final   10/20/2017 0.83 0.60 - 1.10 mg/dL Final   2017 0.73 0.60 - 1.10 mg/dL Final     Microalbumin, Random Urine   Date Value Ref Range Status   10/20/2017 7.59 (H) 0.00 - 1.99 mg/dL Final       Cholesterol   Date Value Ref Range Status   11/15/2017 235 (H) <=199 mg/dL Final     HDL Cholesterol   Date Value Ref Range Status   11/15/2017 44 (L) >=50 mg/dL Final     LDL Calculated   Date Value Ref Range Status   11/15/2017 162 (H) <=129 mg/dL Final     Triglycerides   Date Value Ref Range Status   11/15/2017 143 <=149 mg/dL Final       Lab Results   Component Value Date    ALT 80 (H) 10/20/2017    AST 35 10/20/2017    ALKPHOS 58 10/20/2017    BILITOT 0.7 10/20/2017         Current Medications     Outpatient Medications Prior to Visit   Medication Sig Dispense Refill     blood glucose test (CONTOUR NEXT STRIPS) strips Use 1 each As Directed 4 (four) times a day. 150 strip 6     generic lancets Use 1 each As Directed 4 (four) times a day. 100 each 6     insulin aspart (NOVOLOG  "FLEXPEN) 100 unit/mL injection pen Inject 4 Units under the skin 3 (three) times a day before meals. 15 mL 1     insulin detemir U-100 (LEVEMIR FLEXTOUCH U-100 INSULN) 100 unit/mL (3 mL) pen Inject 22 Units under the skin daily. At bedtime 15 mL 1     pen needle, diabetic (BD ULTRA-FINE MARY PEN NEEDLES) 32 gauge x 5/32\" Ndle Use 1 each As Directed 4 (four) times a day. 100 each 6     metFORMIN (GLUCOPHAGE) 1000 MG tablet Take 1 tablet (1,000 mg total) by mouth 2 (two) times a day with meals. 60 tablet 11     No facility-administered medications prior to visit.        "

## 2021-06-16 NOTE — PROGRESS NOTES
"[facility] GDM Care Plan      Assessment/Plan: pt seen today for f/u. Readings since her visit last week:   FB, 143, 126, 145, 170, 136, 152, 151  PP: 135, 145, 204, 171, 172, 112, 188  She is taking 8 units with all meals and 22 units at HS. She notes she is now eating 2-3 meals/day. However only checking after lunch, she is taking insulin with all the meals.   Pt notes 2 feelings of low BG, during the day in the past week. She recalls they were easy to treat. Asked pt to check 1 hour pp after all meals and not skip meals/snacks to help avoid low BG.   Will increase to 30 units at HS and 10 units per meals per Tanja. Pt will f/u again next week.       Time: 30  Visit Type: pregnancy clinic   Provider: Dr. Diane   Provider's Diagnosis (per referral form): T2 DM in pregnancy   Lab Results   Component Value Date    HGBA1C 8.0 (H) 02/15/2018   Estimated Date of Delivery: 18  Pregnancy #: 5  Previous GDM: No   Medications:   Current Outpatient Prescriptions:      blood glucose test (CONTOUR NEXT STRIPS) strips, Use 1 each As Directed 4 (four) times a day., Disp: 150 strip, Rfl: 6     generic lancets, Use 1 each As Directed 4 (four) times a day., Disp: 100 each, Rfl: 6     insulin aspart (NOVOLOG FLEXPEN) 100 unit/mL injection pen, Inject 4 Units under the skin 3 (three) times a day before meals., Disp: 15 mL, Rfl: 1     insulin detemir U-100 (LEVEMIR FLEXTOUCH U-100 INSULN) 100 unit/mL (3 mL) pen, Inject 22 Units under the skin daily. At bedtime, Disp: 15 mL, Rfl: 1     metFORMIN (GLUCOPHAGE) 1000 MG tablet, Take 1 tablet (1,000 mg total) by mouth 2 (two) times a day with meals., Disp: 60 tablet, Rfl: 11     pen needle, diabetic (BD ULTRA-FINE MARY PEN NEEDLES) 32 gauge x 5/32\" Ndle, Use 1 each As Directed 4 (four) times a day., Disp: 100 each, Rfl: 6      BG monitoring goals: Fasting <95; 1 hour post start of meal <140. Test 4 x per day.  Check fasting a.m. ketones: No  GDM meal pattern/carb counting taught per " guidelines: Yes    Past Goals:  Nutrition: GDM meal plan MOSTLY MET   Activity: Walking after meals when able/staying active MOSTLY MET   Monitoring: BG 4x/day as directed, ketones every morning MOSTLY MET       New Goals:  Nutrition: GDM meal plan   Activity: Walking after meals when able/staying active   Monitoring: BG 4x/day as directed, ketones every morning    DIABETES CARE PLAN AND EDUCATION RECORD    Gestational Diabetes Disease Process/Preconception Care/Management During Pregnancy/Postpartum:Discussed    Meter (per above goals): Discussed    Nutrition Management    Weight: Assessed and Discussed  Portions/Balance: Assessed and Discussed  Carb ID/Count: Assessed and Discussed  Label Reading: Assessed and Discussed  Menu Planning: Assessed and Discussed  Dining Out: Assessed and Discussed  Physical Activity: Assessed and Discussed    Acute Complications: Prevent, Detect, Treat:    Goal Setting and Problem Solving: Assessed and Discussed  Barriers: Assessed and Discussed  Psychosocial Adjustments: Assessed and Discussed      I agree with the aforementioned diabetes plan.  Juana MCCORMICK [facility] Endocrinology  3/15/2018  10:47 AM

## 2021-06-16 NOTE — PROGRESS NOTES
[facility]  ENDOCRINOLOGY    Gestational Diabetes 3/11/2018    Carlos Oliveros, 1990, [id number]          Reason for visit      1. Pregnancy with type 2 diabetes mellitus in second trimester        HPI       Carlos Oliveros is a very pleasant 27 y.o. old female who presents for management of Diabetes Mellitus during pregnancy.  She is currently 25w1d pregnant . Due date is 18    Current carbohydrate intake:consistent with recommendations of 30g-60g-60g.  I have reviewed her blood glucose logs and note that the:  Fasting readings  are:above range on current regimen  Postprandial readings are:in range on current regimen  Current NPH dose: 16 u  Current Prandial insulin: 4/4  Blood glucose logs/meter brought in and data reviewed and incorporated into decision-making.  Planned delivery at: [facility]: Doesn't know  Therapy/Interventions in the past:  She has been seen by the Diabetes Educator- and has received instruction on carbohydrate counting and  consistency.  Records from referring provider and other sources have also been reviewed and incorporated into decision-making.      TODAY:  Carlos Desai returns today in f/u for GDM. She is here with a professional .  She has brought in her log book and is doing a better job of testing. Her FBS are elevated and we will need to increase her dosage. Her prandial readings vary.  We will have her increase her HS dosage and have instructed her to take 6 units with a regular meal and 8 units with a larger meal.  She states that Dr has no current concerns.  Baby is moving appropriately and she is having no swelling.    Past Medical History       Patient Active Problem List   Diagnosis     Type 2 diabetes mellitus treated without insulin     Hyperlipidemia     Pregnancy with type 2 diabetes mellitus in second trimester        Past Surgical History     History reviewed. No pertinent surgical history.    Family History     History reviewed. No pertinent family  HPI:  Patient comes intoday for   Chief Complaint   Patient presents with    Pain     Patient presents with rib pain on the left side. patient says he coughed and felt something pop and instant pain. .    Patient states he choked and when he coughed he felt a pop on the left rib area 5 days ago. Since then ribs are painful on left when moving and taking a deep breath/cough. Prior to Visit Medications    Medication Sig Taking? Authorizing Provider   ibuprofen (ADVIL;MOTRIN) 800 MG tablet Take 1 tablet by mouth every 8 hours as needed for Pain Yes ADAN Shook CNP   lamoTRIgine (LAMICTAL) 25 MG tablet Take 100 mg by mouth daily  Yes Historical Provider, MD   escitalopram (LEXAPRO) 20 MG tablet Take 1 tablet by mouth daily Yes ADAN Wetzel CNP   busPIRone (BUSPAR) 10 MG tablet Take 1 tablet by mouth 3 times daily Yes ADAN Wetzel CNP   SUMAtriptan (IMITREX) 100 MG tablet  Yes Historical Provider, MD   diazepam (VALIUM) 2 MG tablet Take 2 mg by mouth every 6 hours as needed for Anxiety. . Yes Historical Provider, MD   meclizine (ANTIVERT) 25 MG tablet Take 1 tablet by mouth 3 times daily as needed for Dizziness Yes Ari Margarita, DO         Allergies   Allergen Reactions    Erythromycin Nausea And Vomiting         Review of Systems  Review of Systems   Constitutional: Negative for activity change, appetite change and unexpected weight change. Respiratory: Positive for shortness of breath (shallow due to pain). Negative for cough, chest tightness and wheezing. Cardiovascular: Negative for chest pain and palpitations. Gastrointestinal: Negative for constipation, diarrhea, nausea and vomiting. Musculoskeletal: Positive for back pain (left rib and left mid back). Neurological: Positive for dizziness. Negative for weakness, light-headedness and headaches.          VS:  /84   Pulse 69   Temp 97.6 °F (36.4 °C) (Temporal)   Resp 16   Ht 6' (1.829 m)   Wt 201 "history.    Social History     Social History   Substance Use Topics     Smoking status: Never Smoker     Smokeless tobacco: Never Used     Alcohol use No       Review of Systems     Patient has no polyuria or polydipsia, no chest pain, dyspnea or TIA's, no numbness, tingling or pain in extremities  Remainder negative except as noted in HPI.      Vital Signs     /70  Ht 4' 11\" (1.499 m)  Wt 215 lb 1.9 oz (97.6 kg)  BMI 43.45 kg/m2  Wt Readings from Last 3 Encounters:   03/08/18 215 lb 1.9 oz (97.6 kg)   03/01/18 215 lb 11.2 oz (97.8 kg)   02/22/18 213 lb 4.8 oz (96.8 kg)       Physical Exam     GENERAL: Pleasant, alert, appropriate appearance for age. No acute distress,   HEENT: Normocephalic, atraumatic  NECK: Supple, no masses or  lymph nodes.  THYROID: No nodules or enlargement. Non-tender, no bruit  CHEST/RESPIRATORY: Normal chest wall and respirations. Clear to auscultation.  CARDIOVASCULAR: Regular rate and rhythm. S1, S2, no murmur, click, gallop, or rubs.  ABDOMEN: Gravid   LYMPHATIC: No palpable nodes in neck, supraclavicular,  SKIN: No melanosis,  ecchymosis,  vitiligo. No acanthosis nigricans  NEURO:  Non-focal, normal DTRs; no tremor.  PSYCH: Alert and oriented -appropriate affect. Orientation, judgement and memory appear intact.  MSK: No joint abnormalities, FROM in all four extremities. No kyphosis    Assessment     1. Pregnancy with type 2 diabetes mellitus in second trimester        Plan     1. GESTATIONAL DIABETES-  Adjust dose as follows:    -NPH [id number]   units. Increase by 2 units every 2 days to keep fasting blood glucose below 95mg/dL  -Novolog 6  units with breakfast  -Novolog 6 units with lunch   -Novolog 6 units with dinner  -Increase by 2 units every 2 days to keep 1 hour after meal blood glucose less than 140mg/dL  She will take 8 units with a larger meal      We reviewed glucose goals of fasting blood glucose <95 mg/dL and 1 hour post prandial blood glucose of <140 mg/dL.  " lb (91.2 kg)   SpO2 98%   BMI 27.26 kg/m²     Physical Exam  Physical Exam  Constitutional:       General: He is not in acute distress. Appearance: He is well-developed. HENT:      Head: Normocephalic and atraumatic. Neck:      Thyroid: No thyromegaly. Trachea: No tracheal deviation. Cardiovascular:      Rate and Rhythm: Normal rate and regular rhythm. Heart sounds: No murmur. Pulmonary:      Effort: Pulmonary effort is normal. No respiratory distress. Breath sounds: Normal breath sounds. No wheezing or rales. Chest:      Chest wall: No tenderness. Abdominal:      General: Bowel sounds are normal.      Palpations: Abdomen is soft. Tenderness: There is no abdominal tenderness. Musculoskeletal:         General: Tenderness present. No deformity. Comments: Left lower costal pain along mid axillary line and posteriorly   Lymphadenopathy:      Cervical: No cervical adenopathy. Skin:     General: Skin is warm and dry. Neurological:      Mental Status: He is alert and oriented to person, place, and time. Psychiatric:         Behavior: Behavior normal.           Assessment/Plan:  Sharyon Apley was seen today for pain. Diagnoses and all orders for this visit:    Rib pain on left side  -     XR RIBS LEFT INCLUDE CHEST (MIN 3 VIEWS); Future  -     ibuprofen (ADVIL;MOTRIN) 800 MG tablet;  Take 1 tablet by mouth every 8 hours as needed for Pain  -splint for coughing and deep breathing        Greater than 15  Minutes was spent with patient and more than 50% of the time was spent face to facecounseling and educating regarding diagnoses   Monitor blood sugar 4 times daily: Fasting  and 1 hour after each meal.  Contact  this clinic [phone] if blood glucose is not within the above-mentioned goals.     We discussed the importance of excellent glycemic control during pregnancy to limit complications such as fetal macrosomia, shoulder dystocia,  hypoglycemia and hyperbilirubinemia.  I have discussed the patient's increased risk of recurrent GDM and/or development of type 2 diabetes later in life.        She will f/u in 2 weeks.  Time spent with pt today: 25 min with >50% spent in counseling and coordination of care.        Juana Balderas  3/11/2018      Lab Results     Hemoglobin A1c   Date Value Ref Range Status   02/15/2018 8.0 (H) 3.5 - 6.0 % Final   10/20/2017 10.8 (H) 3.5 - 6.0 % Final   2017 13.1 (H) 4.2 - 6.1 % Final   2017 8.1 (H) 3.5 - 6.0 % Final     Creatinine   Date Value Ref Range Status   11/15/2017 0.70 0.60 - 1.10 mg/dL Final   10/20/2017 0.83 0.60 - 1.10 mg/dL Final   2017 0.73 0.60 - 1.10 mg/dL Final     Microalbumin, Random Urine   Date Value Ref Range Status   10/20/2017 7.59 (H) 0.00 - 1.99 mg/dL Final       Cholesterol   Date Value Ref Range Status   11/15/2017 235 (H) <=199 mg/dL Final     HDL Cholesterol   Date Value Ref Range Status   11/15/2017 44 (L) >=50 mg/dL Final     LDL Calculated   Date Value Ref Range Status   11/15/2017 162 (H) <=129 mg/dL Final     Triglycerides   Date Value Ref Range Status   11/15/2017 143 <=149 mg/dL Final       Lab Results   Component Value Date    ALT 80 (H) 10/20/2017    AST 35 10/20/2017    ALKPHOS 58 10/20/2017    BILITOT 0.7 10/20/2017         Current Medications     Outpatient Medications Prior to Visit   Medication Sig Dispense Refill     blood glucose test (CONTOUR NEXT STRIPS) strips Use 1 each As Directed 4 (four) times a day. 150 strip 6     generic lancets Use 1 each As Directed 4 (four) times a day. 100 each 6     insulin aspart (NOVOLOG FLEXPEN) 100  "unit/mL injection pen Inject 4 Units under the skin 3 (three) times a day before meals. 15 mL 1     pen needle, diabetic (BD ULTRA-FINE MARY PEN NEEDLES) 32 gauge x 5/32\" Ndle Use 1 each As Directed 4 (four) times a day. 100 each 6     insulin detemir (LEVEMIR FLEXTOUCH) 100 unit/mL (3 mL) pen Inject 12 Units under the skin daily. At bedtime (Patient taking differently: Inject 16 Units under the skin daily. At bedtime) 15 mL 1     metFORMIN (GLUCOPHAGE) 1000 MG tablet Take 1 tablet (1,000 mg total) by mouth 2 (two) times a day with meals. 60 tablet 11     blood-glucose meter (ONETOUCH VERIO SYSTEM) Misc Use the Verio meter as directed to check blood sugar 1 each 0     No facility-administered medications prior to visit.        "

## 2021-06-16 NOTE — PROGRESS NOTES
[facility]  ENDOCRINOLOGY    Gestational Diabetes 2018    Carlos Oliveros, 1990, [id number]          Reason for visit      1. Pregnancy with type 2 diabetes mellitus in second trimester        HPI     Carlos Oliveros is a very pleasant 27 y.o. old female who presents for management of Diabetes Mellitus during pregnancy.  She is currently 23w1d pregnant . Due date is 18    Current carbohydrate intake:consistent with recommendations of 30g-60g-60g.  I have reviewed her blood glucose logs and note that the:  Fasting readings  are:above range on current regimen  Postprandial readings are:in range on current regimen  Current NPH dose: 12 u  Current Prandial insulin: 4  Blood glucose logs/meter brought in and data reviewed and incorporated into decision-making.  Planned delivery at: [facility]: Doesn't know    Therapy/Interventions in the past:  She has been seen by the Diabetes Educator- and has received instruction on carbohydrate counting and  consistency.  Records from referring provider and other sources have also been reviewed and incorporated into decision-making.      TODAY:  Carlos Desai is here today for the first time after starting insulin.  She has Type 2 DM and is being managed during pregnancy.  She is here with a Professional . She tells me that she ran out of insulin last night.  There is some confusion about this because she was just given two insulin pens last week and she should not be out of it already.  Her  appears to be just as confused about things.  After some work, we determine that she is out of test strips and not the insulin.  She has brought in her log book and her FBS continue to be high. We will increase her insulin dosage this evening.  She is eating twice a day and is taking her insulin with meals.  Baby is moving appropriately and she is having no swelling.      Past Medical History       Patient Active Problem List   Diagnosis     Type 2 diabetes  "mellitus treated without insulin     Hyperlipidemia     Pregnancy with type 2 diabetes mellitus in second trimester        Past Surgical History     History reviewed. No pertinent surgical history.    Family History     History reviewed. No pertinent family history.    Social History     Social History   Substance Use Topics     Smoking status: Never Smoker     Smokeless tobacco: Never Used     Alcohol use No       Review of Systems     Patient has no polyuria or polydipsia, no chest pain, dyspnea or TIA's, no numbness, tingling or pain in extremities  Remainder negative except as noted in HPI.      Vital Signs     /76 (Patient Site: Right Arm, Patient Position: Sitting, Cuff Size: Adult Regular)  Pulse 84  Ht 4' 11\" (1.499 m)  Wt 213 lb 4.8 oz (96.8 kg)  BMI 43.08 kg/m2  Wt Readings from Last 3 Encounters:   02/22/18 213 lb 4.8 oz (96.8 kg)   02/15/18 211 lb 4.8 oz (95.8 kg)   01/16/18 213 lb 9.6 oz (96.9 kg)       Physical Exam     GENERAL: Pleasant, alert, appropriate appearance for age. No acute distress,   HEENT: Normocephalic, atraumatic  NECK: Supple, no masses or  lymph nodes.  THYROID: No nodules or enlargement. Non-tender, no bruit  CHEST/RESPIRATORY: Normal chest wall and respirations. Clear to auscultation.  CARDIOVASCULAR: Regular rate and rhythm. S1, S2, no murmur, click, gallop, or rubs.  ABDOMEN: Gravid   LYMPHATIC: No palpable nodes in neck, supraclavicular,  SKIN: No melanosis,  ecchymosis,  vitiligo. No acanthosis nigricans  NEURO:  Non-focal, normal DTRs; no tremor.  PSYCH: Alert and oriented -appropriate affect. Orientation, judgement and memory appear intact.  MSK: No joint abnormalities, FROM in all four extremities. No kyphosis    Assessment     1. Pregnancy with type 2 diabetes mellitus in second trimester        Plan     1. GESTATIONAL DIABETES-  Adjust dose as follows:    -NPH [id number]   units. Increase by 2 units every 2 days to keep fasting blood glucose below " 95mg/dL  -Novolog 4  units with breakfast  -Novolog 4 units with dinner  -Increase by 2 units every 2 days to keep 1 hour after meal blood glucose less than 140mg/dL    We reviewed glucose goals of fasting blood glucose <95 mg/dL and 1 hour post prandial blood glucose of <140 mg/dL.    Monitor blood sugar 4 times daily: Fasting  and 1 hour after each meal.  Contact  this clinic [phone] if blood glucose is not within the above-mentioned goals.     We discussed the importance of excellent glycemic control during pregnancy to limit complications such as fetal macrosomia, shoulder dystocia,  hypoglycemia and hyperbilirubinemia.  I have discussed the patient's increased risk of recurrent GDM and/or development of type 2 diabetes later in life.        She will f/u next week.  Time spent with pt today: 25 min with >50% spent in counseling and coordination of care.        Juana Balderas  2018        Lab Results     Hemoglobin A1c   Date Value Ref Range Status   02/15/2018 8.0 (H) 3.5 - 6.0 % Final   10/20/2017 10.8 (H) 3.5 - 6.0 % Final   2017 13.1 (H) 4.2 - 6.1 % Final   2017 8.1 (H) 3.5 - 6.0 % Final     Creatinine   Date Value Ref Range Status   11/15/2017 0.70 0.60 - 1.10 mg/dL Final   10/20/2017 0.83 0.60 - 1.10 mg/dL Final   2017 0.73 0.60 - 1.10 mg/dL Final     Microalbumin, Random Urine   Date Value Ref Range Status   10/20/2017 7.59 (H) 0.00 - 1.99 mg/dL Final       Cholesterol   Date Value Ref Range Status   11/15/2017 235 (H) <=199 mg/dL Final     HDL Cholesterol   Date Value Ref Range Status   11/15/2017 44 (L) >=50 mg/dL Final     LDL Calculated   Date Value Ref Range Status   11/15/2017 162 (H) <=129 mg/dL Final     Triglycerides   Date Value Ref Range Status   11/15/2017 143 <=149 mg/dL Final       Lab Results   Component Value Date    ALT 80 (H) 10/20/2017    AST 35 10/20/2017    ALKPHOS 58 10/20/2017    BILITOT 0.7 10/20/2017         Current Medications     Outpatient  "Medications Prior to Visit   Medication Sig Dispense Refill     blood glucose test (CONTOUR NEXT STRIPS) strips Use 1 each As Directed 4 (four) times a day. 150 strip 6     blood-glucose meter (ONETOUCH VERIO SYSTEM) Misc Use the Verio meter as directed to check blood sugar 1 each 0     generic lancets Use 1 each As Directed 4 (four) times a day. 100 each 6     insulin aspart (NOVOLOG FLEXPEN) 100 unit/mL injection pen Inject 4 Units under the skin 3 (three) times a day before meals. 15 mL 1     insulin detemir (LEVEMIR FLEXTOUCH) 100 unit/mL (3 mL) pen Inject 12 Units under the skin daily. At bedtime 15 mL 1     metFORMIN (GLUCOPHAGE) 1000 MG tablet Take 1 tablet (1,000 mg total) by mouth 2 (two) times a day with meals. 60 tablet 11     pen needle, diabetic (BD ULTRA-FINE MARY PEN NEEDLES) 32 gauge x 5/32\" Ndle Use 1 each As Directed 4 (four) times a day. 100 each 6     glipiZIDE (GLUCOTROL) 5 MG tablet Take 1 tablet (5 mg total) by mouth 2 (two) times a day before meals. 1/2 Hour BEFORE meals 60 tablet 6     norgestimate-ethinyl estradiol (ORTHO-CYCLEN) 0.25-35 mg-mcg per tablet Take 1 tablet by mouth daily. 28 tablet 11     pravastatin (PRAVACHOL) 20 MG tablet Take 1 tablet (20 mg total) by mouth at bedtime. 30 tablet 5     No facility-administered medications prior to visit.        "

## 2021-06-16 NOTE — PROGRESS NOTES
"[facility] GDM Care Plan      Assessment/Plan: pt seen today for f/u. She bring in BG meter. She is checking once/day at all different times. Pt reports she is taking insulin, eating, then checking BG right away. Not sure why she has chosen to do things this way. She is not checking FBG at all.   Discussed again, to check first thing upon waking and 1 hour AFTER eating.   Pt was given BG log book and instructed to write down FBG and 1 hour pp readings in book. She agrees to do this.   She is also only eating 1 meal/day. Discussed she needs to be eating more.   Pt will f/u again next week.   She is taking 16 units at HS and 4 units before meal. Reports she is actually taking the insulin.         Time: 30  Visit Type: pregnancy clinic   Provider: Dr. Diane   Provider's Diagnosis (per referral form): T2 DM in pregnancy   Lab Results   Component Value Date    HGBA1C 8.0 (H) 02/15/2018     Estimated Date of Delivery: 6/20/18  Pregnancy #: 5  Previous GDM: No   Medications:   Current Outpatient Prescriptions:      blood glucose test (CONTOUR NEXT STRIPS) strips, Use 1 each As Directed 4 (four) times a day., Disp: 150 strip, Rfl: 6     blood-glucose meter (ONETOUCH VERIO SYSTEM) Misc, Use the Verio meter as directed to check blood sugar, Disp: 1 each, Rfl: 0     generic lancets, Use 1 each As Directed 4 (four) times a day., Disp: 100 each, Rfl: 6     insulin aspart (NOVOLOG FLEXPEN) 100 unit/mL injection pen, Inject 4 Units under the skin 3 (three) times a day before meals., Disp: 15 mL, Rfl: 1     insulin detemir (LEVEMIR FLEXTOUCH) 100 unit/mL (3 mL) pen, Inject 12 Units under the skin daily. At bedtime (Patient taking differently: Inject 16 Units under the skin daily. At bedtime), Disp: 15 mL, Rfl: 1     metFORMIN (GLUCOPHAGE) 1000 MG tablet, Take 1 tablet (1,000 mg total) by mouth 2 (two) times a day with meals., Disp: 60 tablet, Rfl: 11     pen needle, diabetic (BD ULTRA-FINE MARY PEN NEEDLES) 32 gauge x 5/32\" Ndle, Use 1 " each As Directed 4 (four) times a day., Disp: 100 each, Rfl: 6      BG monitoring goals: Fasting <95; 1 hour post start of meal <140. Test 4 x per day.  Check fasting a.m. ketones: No  GDM meal pattern/carb counting taught per guidelines: Yes    Past Goals:  Nutrition: GDM meal plan NOT MET   Activity: Walking after meals when able/staying active SOMETIMES MET   Monitoring: BG 4x/day as directed, ketones every morning NOT MET       New Goals:  Nutrition: GDM meal plan   Activity: Walking after meals when able/staying active   Monitoring: BG 4x/day as directed, ketones every morning    DIABETES CARE PLAN AND EDUCATION RECORD    Gestational Diabetes Disease Process/Preconception Care/Management During Pregnancy/Postpartum:Discussed    Meter (per above goals): Assessed and Discussed    Nutrition Management    Weight: Assessed and Discussed  Portions/Balance: Assessed and Discussed  Carb ID/Count: Assessed and Discussed  Label Reading: Assessed and Discussed  Menu Planning: Assessed and Discussed  Dining Out: Assessed and Discussed  Physical Activity: Assessed and Discussed    Acute Complications: Prevent, Detect, Treat:    Goal Setting and Problem Solving: Assessed and Discussed  Barriers: Assessed and Discussed  Psychosocial Adjustments: Assessed and Discussed    I agree with the aforementioned diabetes plan.  Juana MCCORMICK [facility] End[facility]  3/1/2018  12:38 PM

## 2021-06-16 NOTE — PROGRESS NOTES
"[facility] GDM Care Plan      Assessment/Plan: pt seen today for f/u. She brings in BG log book. She has been checking BG as asked and has correctly filled out  Her log book with FBG and 1 hour pp. She is usually only eating 1 meal/day. Pt is taking 16 units at HS and 4 units with meals. FBG are 120-140, will increase HS to 22 units per Tanja. Pp readings are 140-160 or 200-220. Pt notes sometimes she is eating larger meals. Will increase to 6 units with meals and 8 units if the meal is larger. This was all written down for pt and she verbalized understanding.   Pt will f/u again next week.       Time: 30  Visit Type: pregnancy clinic   Provider: Dr. Diane   Provider's Diagnosis (per referral form): T2 DM in pregnancy   Lab Results   Component Value Date    HGBA1C 8.0 (H) 02/15/2018     Estimated Date of Delivery: 6/20/18  Pregnancy #: 5  Previous GDM: No   Medications:   Current Outpatient Prescriptions:      blood glucose test (CONTOUR NEXT STRIPS) strips, Use 1 each As Directed 4 (four) times a day., Disp: 150 strip, Rfl: 6     generic lancets, Use 1 each As Directed 4 (four) times a day., Disp: 100 each, Rfl: 6     insulin aspart (NOVOLOG FLEXPEN) 100 unit/mL injection pen, Inject 4 Units under the skin 3 (three) times a day before meals., Disp: 15 mL, Rfl: 1     insulin detemir (LEVEMIR FLEXTOUCH) 100 unit/mL (3 mL) pen, Inject 12 Units under the skin daily. At bedtime (Patient taking differently: Inject 16 Units under the skin daily. At bedtime), Disp: 15 mL, Rfl: 1     metFORMIN (GLUCOPHAGE) 1000 MG tablet, Take 1 tablet (1,000 mg total) by mouth 2 (two) times a day with meals., Disp: 60 tablet, Rfl: 11     pen needle, diabetic (BD ULTRA-FINE MARY PEN NEEDLES) 32 gauge x 5/32\" Ndle, Use 1 each As Directed 4 (four) times a day., Disp: 100 each, Rfl: 6      BG monitoring goals: Fasting <95; 1 hour post start of meal <140. Test 4 x per day.  Check fasting a.m. ketones: No  GDM meal pattern/carb counting taught per " guidelines: Yes    Past Goals:  Nutrition: GDM meal plan SOMETIMES MET   Activity: Walking after meals when able/staying active SOMETIMES MET   Monitoring: BG 4x/day as directed, ketones every morning MET       New Goals:  Nutrition: GDM meal plan   Activity: Walking after meals when able/staying active   Monitoring: BG 4x/day as directed, ketones every morning    DIABETES CARE PLAN AND EDUCATION RECORD    Gestational Diabetes Disease Process/Preconception Care/Management During Pregnancy/Postpartum:Discussed    Meter (per above goals): Discussed    Nutrition Management    Weight: Assessed and Discussed  Portions/Balance: Assessed and Discussed  Carb ID/Count: Assessed and Discussed  Label Reading: Assessed and Discussed  Menu Planning: Assessed and Discussed  Dining Out: Assessed and Discussed  Physical Activity: Assessed and Discussed    Acute Complications: Prevent, Detect, Treat:    Goal Setting and Problem Solving: Assessed and Discussed  Barriers: Assessed and Discussed  Psychosocial Adjustments: Assessed and Discussed    I agree with the aforementioned diabetes plan.  Juana MCCORMICK [facility]  3/8/2018  10:08 AM

## 2021-06-16 NOTE — PROGRESS NOTES
[facility]  ENDOCRINOLOGY    Gestational Diabetes 3/25/2018    Carlos Oliveros, 1990, [id number]          Reason for visit      1. Pre-existing type 2 diabetes mellitus during pregnancy in second trimester        HPI     Carlos Oliveros is a very pleasant 27 y.o. old female who presents for management of Diabetes Mellitus during pregnancy.  She is currently 28w1d pregnant . Due date is 18    Current carbohydrate intake:consistent with recommendations of 30g-60g-60g.  I have reviewed her blood glucose logs and note that the:  Fasting readings  are:above range on current regimen  Postprandial readings are:in range on current regimen  Current NPH dose: 30 u  Current Prandial insulin:   Blood glucose logs/meter brought in and data reviewed and incorporated into decision-making.  Planned delivery at: [facility]: [place]   Therapy/Interventions in the past:  She has been seen by the Diabetes Educator- and has received instruction on carbohydrate counting and  consistency.  Records from referring provider and other sources have also been reviewed and incorporated into decision-making.      TODAY:  Carlos returns today in f/u for management of DM during pregnancy.  She is here with a professional . She has brought her log book with her and her FBS and the occasional time that she has tested postprandially.  She has no readings for dinner. Most often, she is eating only twice a day. We will increase both her HS and her prandial insulin.  Baby is moving appropriately and she is having no swelling.  She will see the Dr next week.     Past Medical History       Patient Active Problem List   Diagnosis     Type 2 diabetes mellitus treated without insulin     Hyperlipidemia     Pregnancy with type 2 diabetes mellitus in second trimester        Past Surgical History     History reviewed. No pertinent surgical history.    Family History     History reviewed. No pertinent family history.    Social History     Social  "History   Substance Use Topics     Smoking status: Never Smoker     Smokeless tobacco: Never Used     Alcohol use No       Review of Systems     Patient has no polyuria or polydipsia, no chest pain, dyspnea or TIA's, no numbness, tingling or pain in extremities  Remainder negative except as noted in HPI.      Vital Signs     /80 (Patient Site: Right Arm, Patient Position: Sitting, Cuff Size: Adult Regular)  Pulse 80  Ht 4' 11\" (1.499 m)  Wt 219 lb (99.3 kg)  BMI 44.23 kg/m2  Wt Readings from Last 3 Encounters:   03/22/18 219 lb (99.3 kg)   03/15/18 218 lb 12.8 oz (99.2 kg)   03/08/18 215 lb 1.9 oz (97.6 kg)       Physical Exam     GENERAL: Pleasant, alert, appropriate appearance for age. No acute distress,   HEENT: Normocephalic, atraumatic  NECK: Supple, no masses or  lymph nodes.  THYROID: No nodules or enlargement. Non-tender, no bruit  CHEST/RESPIRATORY: Normal chest wall and respirations. Clear to auscultation.  CARDIOVASCULAR: Regular rate and rhythm. S1, S2, no murmur, click, gallop, or rubs.  ABDOMEN: Gravid   LYMPHATIC: No palpable nodes in neck, supraclavicular,  SKIN: No melanosis,  ecchymosis,  vitiligo. No acanthosis nigricans  NEURO:  Non-focal, normal DTRs; no tremor.  PSYCH: Alert and oriented -appropriate affect. Orientation, judgement and memory appear intact.  MSK: No joint abnormalities, FROM in all four extremities. No kyphosis    Assessment     1. Pre-existing type 2 diabetes mellitus during pregnancy in second trimester        Plan       1. GESTATIONAL DIABETES-  Adjust dose as follows:    -NPH [id number]   units. Increase by 2 units every 2 days to keep fasting blood glucose below 95mg/dL  -Novolog 12  units with breakfast  -Novolog 0 units with lunch   -Novolog 12 units with dinner  -Increase by 2 units every 2 days to keep 1 hour after meal blood glucose less than 140mg/dL    We reviewed glucose goals of fasting blood glucose <95 mg/dL and 1 hour post prandial blood glucose of <140 " mg/dL.    Monitor blood sugar 4 times daily: Fasting  and 1 hour after each meal.  Contact  this clinic [phone] if blood glucose is not within the above-mentioned goals.     We discussed the importance of excellent glycemic control during pregnancy to limit complications such as fetal macrosomia, shoulder dystocia,  hypoglycemia and hyperbilirubinemia.  I have discussed the patient's increased risk of recurrent GDM and/or development of type 2 diabetes later in life.      Strongly urged and instructed to test after ALL meals.  F/u in 2 weeks.  Time spent with pt today: 25 min with >50% spent in counseling and coordination of care.        Juana Balderas  3/25/2018      Lab Results     Hemoglobin A1c   Date Value Ref Range Status   02/15/2018 8.0 (H) 3.5 - 6.0 % Final   10/20/2017 10.8 (H) 3.5 - 6.0 % Final   2017 13.1 (H) 4.2 - 6.1 % Final   2017 8.1 (H) 3.5 - 6.0 % Final     Creatinine   Date Value Ref Range Status   11/15/2017 0.70 0.60 - 1.10 mg/dL Final   10/20/2017 0.83 0.60 - 1.10 mg/dL Final   2017 0.73 0.60 - 1.10 mg/dL Final     Microalbumin, Random Urine   Date Value Ref Range Status   10/20/2017 7.59 (H) 0.00 - 1.99 mg/dL Final       Cholesterol   Date Value Ref Range Status   11/15/2017 235 (H) <=199 mg/dL Final     HDL Cholesterol   Date Value Ref Range Status   11/15/2017 44 (L) >=50 mg/dL Final     LDL Calculated   Date Value Ref Range Status   11/15/2017 162 (H) <=129 mg/dL Final     Triglycerides   Date Value Ref Range Status   11/15/2017 143 <=149 mg/dL Final       Lab Results   Component Value Date    ALT 80 (H) 10/20/2017    AST 35 10/20/2017    ALKPHOS 58 10/20/2017    BILITOT 0.7 10/20/2017         Current Medications     Outpatient Medications Prior to Visit   Medication Sig Dispense Refill     insulin detemir U-100 (LEVEMIR FLEXTOUCH U-100 INSULN) 100 unit/mL (3 mL) pen Inject 22 Units under the skin daily. At bedtime (Patient taking differently: Inject 36  "Units under the skin daily. At bedtime) 15 mL 1     blood glucose test (CONTOUR NEXT STRIPS) strips Use 1 each As Directed 4 (four) times a day. 150 strip 6     generic lancets Use 1 each As Directed 4 (four) times a day. 100 each 6     insulin aspart (NOVOLOG FLEXPEN) 100 unit/mL injection pen Inject 4 Units under the skin 3 (three) times a day before meals. (Patient taking differently: Inject 8 Units under the skin 3 (three) times a day before meals. ) 15 mL 1     pen needle, diabetic (BD ULTRA-FINE MARY PEN NEEDLES) 32 gauge x 5/32\" Ndle Use 1 each As Directed 4 (four) times a day. 100 each 6     No facility-administered medications prior to visit.        "

## 2021-06-16 NOTE — PROGRESS NOTES
[facility]  ENDOCRINOLOGY    Gestational Diabetes 3/4/2018    Carlos Oliveros, 1990, [id number]          Reason for visit      1. Pregnancy with type 2 diabetes mellitus in second trimester        HPI     Carlos Oliveros is a very pleasant 27 y.o. old female who presents for management of Diabetes Mellitus during pregnancy.  She is currently 24w6d pregnant . Due date is 18    Current carbohydrate intake:consistent with recommendations of 30g-60g-60g.  I have reviewed her blood glucose logs and note that the:  Fasting readings  are:above range on current regimen  Postprandial readings are:in range on current regimen  Current NPH dose: 16 u  Current Prandial insulin:   Blood glucose logs/meter brought in and data reviewed and incorporated into decision-making.  Planned delivery at: [facility]: Doesn't know  Therapy/Interventions in the past:  She has been seen by the Diabetes Educator- and has received instruction on carbohydrate counting and  consistency.  Records from referring provider and other sources have also been reviewed and incorporated into decision-making.      TODAY:  Carlos Desai returns today in f/u for management of diabetes during pregnancy. She is here with a professional .  She has brought in her meter and her readings clearly do not include an FBS. With questions, she reports that she is taking her insulin, eating, and then testing immediately.  We are at a loss as to why she is doing this, as she has been instructed to test 1 hour postprandially.  When I bring this up, she laughs and states that she forgot. She is also not eating regularly, and often only eats one meal a day. States that she is out shopping and that she isn't hungry.      Past Medical History       Patient Active Problem List   Diagnosis     Type 2 diabetes mellitus treated without insulin     Hyperlipidemia     Pregnancy with type 2 diabetes mellitus in second trimester        Past Surgical History     History  "reviewed. No pertinent surgical history.    Family History     History reviewed. No pertinent family history.    Social History     Social History   Substance Use Topics     Smoking status: Never Smoker     Smokeless tobacco: Never Used     Alcohol use No       Review of Systems     Patient has no polyuria or polydipsia, no chest pain, dyspnea or TIA's, no numbness, tingling or pain in extremities  Remainder negative except as noted in HPI.      Vital Signs     /84 (Patient Site: Right Arm, Patient Position: Sitting, Cuff Size: Adult Regular)  Pulse 84  Ht 4' 11\" (1.499 m)  Wt 215 lb 11.2 oz (97.8 kg)  BMI 43.57 kg/m2  Wt Readings from Last 3 Encounters:   03/01/18 215 lb 11.2 oz (97.8 kg)   02/22/18 213 lb 4.8 oz (96.8 kg)   02/15/18 211 lb 4.8 oz (95.8 kg)       Physical Exam     GENERAL: Pleasant, alert, appropriate appearance for age. No acute distress,   HEENT: Normocephalic, atraumatic  NECK: Supple, no masses or  lymph nodes.  THYROID: No nodules or enlargement. Non-tender, no bruit  CHEST/RESPIRATORY: Normal chest wall and respirations. Clear to auscultation.  CARDIOVASCULAR: Regular rate and rhythm. S1, S2, no murmur, click, gallop, or rubs.  ABDOMEN: Gravid   LYMPHATIC: No palpable nodes in neck, supraclavicular,  SKIN: No melanosis,  ecchymosis,  vitiligo. No acanthosis nigricans  NEURO:  Non-focal, normal DTRs; no tremor.  PSYCH: Alert and oriented -appropriate affect. Orientation, judgement and memory appear intact.  MSK: No joint abnormalities, FROM in all four extremities. No kyphosis    Assessment     1. Pregnancy with type 2 diabetes mellitus in second trimester        Plan     1. GESTATIONAL DIABETES-  Adjust dose as follows:    -NPH insulin 16   units. Increase by 2 units every 2 days to keep fasting blood glucose below 95mg/dL  -Novolog 4  units with breakfast  -Novolog 0 units with lunch   -Novolog 4 units with dinner  -Increase by 2 units every 2 days to keep 1 hour after meal blood " glucose less than 140mg/dL    We reviewed glucose goals of fasting blood glucose <95 mg/dL and 1 hour post prandial blood glucose of <140 mg/dL.    Monitor blood sugar 4 times daily: Fasting  and 1 hour after each meal.  Contact  this clinic [phone] if blood glucose is not within the above-mentioned goals.     We discussed the importance of excellent glycemic control during pregnancy to limit complications such as fetal macrosomia, shoulder dystocia,  hypoglycemia and hyperbilirubinemia.  I have discussed the patient's increased risk of recurrent GDM and/or development of type 2 diabetes later in life.      Instructed to eat more than once a day, and why. Instructed to test 1 hour after meals.  F/u in 1 week.  Time spent with pt today: 25 min with >50% spent in counseling and coordination of care.          Juana Balderas  3/4/2018      This note has been dictated using voice recognition software.  Any grammatical or context distortions are unintentional and inherent to the software.     Lab Results     Hemoglobin A1c   Date Value Ref Range Status   02/15/2018 8.0 (H) 3.5 - 6.0 % Final   10/20/2017 10.8 (H) 3.5 - 6.0 % Final   2017 13.1 (H) 4.2 - 6.1 % Final   2017 8.1 (H) 3.5 - 6.0 % Final     Creatinine   Date Value Ref Range Status   11/15/2017 0.70 0.60 - 1.10 mg/dL Final   10/20/2017 0.83 0.60 - 1.10 mg/dL Final   2017 0.73 0.60 - 1.10 mg/dL Final     Microalbumin, Random Urine   Date Value Ref Range Status   10/20/2017 7.59 (H) 0.00 - 1.99 mg/dL Final       Cholesterol   Date Value Ref Range Status   11/15/2017 235 (H) <=199 mg/dL Final     HDL Cholesterol   Date Value Ref Range Status   11/15/2017 44 (L) >=50 mg/dL Final     LDL Calculated   Date Value Ref Range Status   11/15/2017 162 (H) <=129 mg/dL Final     Triglycerides   Date Value Ref Range Status   11/15/2017 143 <=149 mg/dL Final       Lab Results   Component Value Date    ALT 80 (H) 10/20/2017    AST 35 10/20/2017     "ALKPHOS 58 10/20/2017    BILITOT 0.7 10/20/2017         Current Medications     Outpatient Medications Prior to Visit   Medication Sig Dispense Refill     blood glucose test (CONTOUR NEXT STRIPS) strips Use 1 each As Directed 4 (four) times a day. 150 strip 6     blood-glucose meter (ONETOUCH VERIO SYSTEM) Misc Use the Verio meter as directed to check blood sugar 1 each 0     generic lancets Use 1 each As Directed 4 (four) times a day. 100 each 6     insulin aspart (NOVOLOG FLEXPEN) 100 unit/mL injection pen Inject 4 Units under the skin 3 (three) times a day before meals. 15 mL 1     insulin detemir (LEVEMIR FLEXTOUCH) 100 unit/mL (3 mL) pen Inject 12 Units under the skin daily. At bedtime (Patient taking differently: Inject 16 Units under the skin daily. At bedtime) 15 mL 1     metFORMIN (GLUCOPHAGE) 1000 MG tablet Take 1 tablet (1,000 mg total) by mouth 2 (two) times a day with meals. 60 tablet 11     pen needle, diabetic (BD ULTRA-FINE MARY PEN NEEDLES) 32 gauge x 5/32\" Ndle Use 1 each As Directed 4 (four) times a day. 100 each 6     No facility-administered medications prior to visit.        "

## 2021-06-16 NOTE — PROGRESS NOTES
[facility] GDM Care Plan    Assessment: Pt seen today for f/u. She was initially seen on 18 and is just following up now as she has missed her last 3 appts.   Today she did not bring in her meter or log book.   She reports FB, 114, 130, 128, 140  She reports pp readings around 150. Twice they have gone >200 that she recalls.    Pt reports she took the novolog with meals for 2-3 days then stopped, she never took the HS insulin. She wonders if she can take pills. Discussed insulin is safest and most effective during pregnancy. Discussed the risks of her BG being to high and the need for insulin. She states she feels her numbers have improved on their own lately as she has been watching her diet. A1C done today at 8.0% - improved from 10.8 in October.   Discussed with Tanja. Will re-start at 12 units of Levemir at HS and 4 units of Novolog/meals. Pt was given new pens of each insulin today per Tanja.   Stressed to pt the importance of doing this and the risks for the baby. She agrees to take the insulin.   She has an appt to f/u again next week in pregnancy clinic on 18.       Visit Type: GDM Individual Follow-up  Time: 30  Visit #: 2  Provider: Dr. Benedicto Hu OB   Provider's Diagnosis (per referral form): T2 DM in pregnancy   Weight: 211 lb 4.8 oz (95.8 kg)   Lab Results   Component Value Date    HGBA1C 8.0 (H) 02/15/2018   Estimated Date of Delivery: 18  Pregnancy #: 5  Previous GDM: No   Medications:   Current Outpatient Prescriptions:      blood glucose test (CONTOUR NEXT STRIPS) strips, Use 1 each As Directed 4 (four) times a day., Disp: 150 strip, Rfl: 6     blood-glucose meter (ONETOUCH VERIO SYSTEM) Misc, Use the Verio meter as directed to check blood sugar, Disp: 1 each, Rfl: 0     generic lancets, Use 1 each As Directed 4 (four) times a day., Disp: 100 each, Rfl: 6     glipiZIDE (GLUCOTROL) 5 MG tablet, Take 1 tablet (5 mg total) by mouth 2 (two) times a day before meals. 1/2 Hour BEFORE  "meals, Disp: 60 tablet, Rfl: 6     insulin aspart (NOVOLOG FLEXPEN) 100 unit/mL injection pen, Inject 4 Units under the skin 3 (three) times a day before meals., Disp: 15 mL, Rfl: 1     insulin detemir (LEVEMIR FLEXTOUCH) 100 unit/mL (3 mL) pen, Inject 12 Units under the skin daily. At bedtime, Disp: 15 mL, Rfl: 1     metFORMIN (GLUCOPHAGE) 1000 MG tablet, Take 1 tablet (1,000 mg total) by mouth 2 (two) times a day with meals., Disp: 60 tablet, Rfl: 11     norgestimate-ethinyl estradiol (ORTHO-CYCLEN) 0.25-35 mg-mcg per tablet, Take 1 tablet by mouth daily., Disp: 28 tablet, Rfl: 11     pen needle, diabetic (BD ULTRA-FINE MARY PEN NEEDLES) 32 gauge x 5/32\" Ndle, Use 1 each As Directed 4 (four) times a day., Disp: 100 each, Rfl: 6     pravastatin (PRAVACHOL) 20 MG tablet, Take 1 tablet (20 mg total) by mouth at bedtime., Disp: 30 tablet, Rfl: 5    BG monitoring goals: Fasting <95; 1 hour post start of meal <140. Test 4 x per day.  Check fasting a.m. ketones: No  GDM meal pattern/carb counting taught per guidelines: Yes    Past Goals:  Nutrition: GDM meal plan MOSTLY MET   Activity: Walking after meals when able/staying active MOSTLY MET   Monitoring: BG 4x/day as directed, ketones every morning UNKNOWN - NO RECORDS TODAY       New Goals:  Nutrition: GDM meal plan   Activity: Walking after meals when able/staying active   Monitoring: BG 4x/day as directed, ketones every morning    DIABETES CARE PLAN AND EDUCATION RECORD    Gestational Diabetes Disease Process/Preconception Care/Management During Pregnancy/Postpartum:Discussed    Meter (per above goals): Discussed    Nutrition Management    Weight: Assessed and Discussed  Portions/Balance: Assessed and Discussed  Carb ID/Count: Assessed and Discussed  Label Reading: Assessed and Discussed  Menu Planning: Assessed and Discussed  Dining Out: Assessed and Discussed  Physical Activity: Assessed and Discussed    Acute Complications: Prevent, Detect, Treat:    Goal Setting " and Problem Solving: Assessed and Discussed  Barriers: Assessed and Discussed  Psychosocial Adjustments: Assessed and Discussed      I agree with the aforementioned diabetes plan.  Juana MCCORMICK [facility]  2/15/2018  12:10 PM

## 2021-06-16 NOTE — PROGRESS NOTES
"[facility] GDM Care Plan      Assessment/Plan: pt seen today for f/u.  is present. Pt is using 12 units at HS of levemir and 4 units with meals. She reports she is actually taking the insulin. She brings in BG meter:   FB, 138, 137  After breakfast: 140, 135, 192, 126  After dinner: 87, 118, 103, 129    Will increase to 16 units at HS per Tanja.   Pt will f/u again next week.       Time: 30  Visit Type: pregnancy clinic   Provider: Dr. Diane   Provider's Diagnosis (per referral form): T2 DM in pregnancy   Lab Results   Component Value Date    HGBA1C 8.0 (H) 02/15/2018   Estimated Date of Delivery: 18  Pregnancy #: 5  Previous GDM: No   Medications:   Current Outpatient Prescriptions:      blood glucose test (CONTOUR NEXT STRIPS) strips, Use 1 each As Directed 4 (four) times a day., Disp: 150 strip, Rfl: 6     blood-glucose meter (ONETOUCH VERIO SYSTEM) Misc, Use the Verio meter as directed to check blood sugar, Disp: 1 each, Rfl: 0     generic lancets, Use 1 each As Directed 4 (four) times a day., Disp: 100 each, Rfl: 6     insulin aspart (NOVOLOG FLEXPEN) 100 unit/mL injection pen, Inject 4 Units under the skin 3 (three) times a day before meals., Disp: 15 mL, Rfl: 1     insulin detemir (LEVEMIR FLEXTOUCH) 100 unit/mL (3 mL) pen, Inject 12 Units under the skin daily. At bedtime, Disp: 15 mL, Rfl: 1     metFORMIN (GLUCOPHAGE) 1000 MG tablet, Take 1 tablet (1,000 mg total) by mouth 2 (two) times a day with meals., Disp: 60 tablet, Rfl: 11     pen needle, diabetic (BD ULTRA-FINE MARY PEN NEEDLES) 32 gauge x 5/32\" Ndle, Use 1 each As Directed 4 (four) times a day., Disp: 100 each, Rfl: 6      BG monitoring goals: Fasting <95; 1 hour post start of meal <140. Test 4 x per day.  Check fasting a.m. ketones: No  GDM meal pattern/carb counting taught per guidelines: Yes    Past Goals:  Nutrition: GDM meal plan MOSTLY MET   Activity: Walking after meals when able/staying active MOSTLY MET   Monitoring: BG " 4x/day as directed, ketones every morning MOSTLY MET       New Goals:  Nutrition: GDM meal plan   Activity: Walking after meals when able/staying active   Monitoring: BG 4x/day as directed, ketones every morning    DIABETES CARE PLAN AND EDUCATION RECORD    Gestational Diabetes Disease Process/Preconception Care/Management During Pregnancy/Postpartum:Discussed    Meter (per above goals): Assessed and Discussed    Nutrition Management    Weight: Assessed and Discussed  Portions/Balance: Assessed and Discussed  Carb ID/Count: Assessed and Discussed  Label Reading: Assessed and Discussed  Menu Planning: Assessed and Discussed  Dining Out: Assessed and Discussed  Physical Activity: Assessed and Discussed    Acute Complications: Prevent, Detect, Treat:    Goal Setting and Problem Solving: Assessed and Discussed  Barriers: Assessed and Discussed  Psychosocial Adjustments: Assessed and Discussed      I agree with the aforementioned diabetes plan.  Juana Shahidkalyn  [facility] Endocrinology  2/22/2018  10:42 AM

## 2021-06-17 NOTE — PROGRESS NOTES
"[facility] GDM Care Plan      Assessment/Plan: pt seen today for f/u. She brings in BG log book. She is now checking her BG after dinner (as well as before).   FBG are not moving much. She is currently taking 46 units at HS, will increase to 52 units at HS.   PP readings are improving, 2-3 elevations/week. Currently taking 16 units at HS, will increase to 18 units at HS.       Time: 30  Visit Type: pregnancy clinic   Provider: Dr. Diane   Provider's Diagnosis (per referral form): T2 DM in pregnancy   Lab Results   Component Value Date    HGBA1C 8.0 (H) 02/15/2018   Estimated Date of Delivery: 6/20/18   Pregnancy #: 5  Previous GDM: No   Medications:   Current Outpatient Prescriptions:      alcohol swabs (ALCOHOL PREP SWABS) PadM, Use 4 time daily with glucometer, Disp: 100 each, Rfl: 5     blood glucose test (CONTOUR NEXT STRIPS) strips, Use 1 each As Directed 4 (four) times a day., Disp: 150 strip, Rfl: 6     generic lancets, Use 1 each As Directed 4 (four) times a day., Disp: 100 each, Rfl: 6     insulin aspart U-100 (NOVOLOG FLEXPEN U-100 INSULIN) 100 unit/mL injection pen, Take 12 units with meals (Patient taking differently: Take 16 units with meals), Disp: 15 mL, Rfl: 1     insulin detemir U-100 (LEVEMIR FLEXTOUCH U-100 INSULN) 100 unit/mL (3 mL) pen, Inject 22 Units under the skin daily. At bedtime (Patient taking differently: Inject 46 Units under the skin daily. At bedtime), Disp: 15 mL, Rfl: 1     pen needle, diabetic (BD ULTRA-FINE MARY PEN NEEDLES) 32 gauge x 5/32\" Ndle, Use 1 each As Directed 4 (four) times a day., Disp: 100 each, Rfl: 6      BG monitoring goals: Fasting <95; 1 hour post start of meal <140. Test 4 x per day.  Check fasting a.m. ketones: No  GDM meal pattern/carb counting taught per guidelines: Yes    Past Goals:  Nutrition: GDM meal plan MOSTLY MET   Activity: Walking after meals when able/staying active MOSTLY MET   Monitoring: BG 4x/day as directed, ketones every morning MET       New " Goals:  Nutrition: GDM meal plan   Activity: Walking after meals when able/staying active   Monitoring: BG 4x/day as directed, ketones every morning    DIABETES CARE PLAN AND EDUCATION RECORD    Gestational Diabetes Disease Process/Preconception Care/Management During Pregnancy/Postpartum:Discussed    Meter (per above goals): Discussed    Nutrition Management    Weight: Assessed and Discussed  Portions/Balance: Assessed and Discussed  Carb ID/Count: Assessed and Discussed  Label Reading: Assessed and Discussed  Menu Planning: Assessed and Discussed  Dining Out: Assessed and Discussed  Physical Activity: Assessed and Discussed    Acute Complications: Prevent, Detect, Treat:    Goal Setting and Problem Solving: Assessed and Discussed  Barriers: Assessed and Discussed  Psychosocial Adjustments: Assessed and Discussed    I agree with the aforementioned diabetes plan.  Juana MCCORMICK [facility] Endocrinology  4/19/2018  10:07 AM

## 2021-06-17 NOTE — TELEPHONE ENCOUNTER
Telephone Encounter by Teresa Zapata CMA at 12/7/2020  4:20 PM     Author: Teresa Zapata CMA Service: -- Author Type: Certified Medical Assistant    Filed: 12/7/2020  4:22 PM Encounter Date: 12/7/2020 Status: Signed    : Teresa Zapata CMA (Certified Medical Assistant)         Patient Quality Outreach          Summary:    Patient has the following on her problem list/HM:     Diabetes    Last A1C:   Lab Results   Component Value Date    HGBA1C 10.9 (H) 01/16/2020       Last LDL:   Lab Results   Component Value Date    LDLCALC 128 01/16/2020       Is the patient on a Statin? Yes           Is the patient on Aspirin? No    Medications     Antihyperlipidemic - HMG CoA Reductase Inhibitors (statins) Disp Start End     atorvastatin (LIPITOR) 20 MG tablet    90 tablet 6/6/2019     Sig: Take 1 tablet (20 mg total) by mouth at bedtime.    Route: Oral           Last three blood pressure readings:  BP Readings from Last 3 Encounters:   01/16/20 130/84   07/16/19 118/78   06/06/19 120/88            Tobacco Use      Smoking status: Never Smoker      Smokeless tobacco: Never Used      Patient is due/failing the following:   A1C, LDL (Fasting), Eye Exam and Microalbumin, Cervical Cancer Screening - PAP Needed, Annual Wellness, Chlamydia and Immunizations    Type of outreach:    Phone, spoke to patient/parent. Patient/parent will call back to schedule.  Decline schedule     Questions for provider review:    None                                                                                                                                        Teresa Zapata     Chart routed to

## 2021-06-17 NOTE — PROGRESS NOTES
Pt did not show for pregnancy clinic appt today. Will have scheduling contact pt to reschedule.

## 2021-06-17 NOTE — PROGRESS NOTES
[facility]  ENDOCRINOLOGY    Gestational Diabetes 2018    Carlos Oliveros, 1990, [id number]          Reason for visit      1. Pregnancy with type 2 diabetes mellitus in second trimester        HPI     Carlos Oliveros is a very pleasant 27 y.o. old female who presents for management of Diabetes Mellitus during pregnancy.  She is currently 29w pregnant . Due date is 18    Current carbohydrate intake:consistent with recommendations of 30g-60g-60g.  I have reviewed her blood glucose logs and note that the:  Fasting readings  are:above range on current regimen  Postprandial readings are:in range on current regimen  Current NPH dose: 36 u  Current Prandial insulin:   Blood glucose logs/meter brought in and data reviewed and incorporated into decision-making.  Planned delivery at: [facility]: [place]   Therapy/Interventions in the past:  She has been seen by the Diabetes Educator- and has received instruction on carbohydrate counting and  consistency.  Records from referring provider and other sources have also been reviewed and incorporated into decision-making.      TODAY:  Carlos Desai returns today in f/u for management of DM 2 during pregnancy.  She is here with a professional . There are only two sets of numbers in her log book, and they are all elevated.  In spite of instructing her at every appointment, she continues to do as she pleases as far as testing.  We have had conflicting information from her about when she is testing. Today, she told us that the first set of numbers that she has in her book were FBS, and then promptly stated that they were before she had lunch.  She also stated that the only other set of numbers that she had were both before and after lunch.  She states that she is taking insulin before lunch and dinner, but she is not testing after dinner.  She states that her fingers hurt.  She has not yet had a measurement US, but should have by now. She missed her most recent  "appointment with her OB because of transportation issues.  I am not sure that she has made up all of her missed appointments.  Baby is moving appropriately and she is having no swelling.     Past Medical History       Patient Active Problem List   Diagnosis     Type 2 diabetes mellitus treated without insulin     Hyperlipidemia     Pregnancy with type 2 diabetes mellitus in second trimester        Past Surgical History     History reviewed. No pertinent surgical history.    Family History     History reviewed. No pertinent family history.    Social History     Social History   Substance Use Topics     Smoking status: Never Smoker     Smokeless tobacco: Never Used     Alcohol use No       Review of Systems     Patient has no polyuria or polydipsia, no chest pain, dyspnea or TIA's, no numbness, tingling or pain in extremities  Remainder negative except as noted in HPI.      Vital Signs     /70 (Patient Site: Right Arm, Patient Position: Sitting, Cuff Size: Adult Regular)  Pulse 78  Ht 4' 11\" (1.499 m)  Wt 220 lb 8 oz (100 kg)  BMI 44.54 kg/m2  Wt Readings from Last 3 Encounters:   04/05/18 220 lb 8 oz (100 kg)   03/22/18 219 lb (99.3 kg)   03/15/18 218 lb 12.8 oz (99.2 kg)       Physical Exam     GENERAL: Pleasant, alert, appropriate appearance for age. No acute distress,   HEENT: Normocephalic, atraumatic  NECK: Supple, no masses or  lymph nodes.  THYROID: No nodules or enlargement. Non-tender, no bruit  CHEST/RESPIRATORY: Normal chest wall and respirations. Clear to auscultation.  CARDIOVASCULAR: Regular rate and rhythm. S1, S2, no murmur, click, gallop, or rubs.  ABDOMEN: Gravid   LYMPHATIC: No palpable nodes in neck, supraclavicular,  SKIN: No melanosis,  ecchymosis,  vitiligo. No acanthosis nigricans  NEURO:  Non-focal, normal DTRs; no tremor.  PSYCH: Alert and oriented -appropriate affect. Orientation, judgement and memory appear intact.  MSK: No joint abnormalities, FROM in all four extremities. No " kyphosis    Assessment     1. Pregnancy with type 2 diabetes mellitus in second trimester        Plan     1. GESTATIONAL DIABETES-  Adjust dose as follows:    -NPH insulin 46  units. Increase by 2 units every 2 days to keep fasting blood glucose below 95mg/dL  -Novolog  0 units with breakfast  -Novolog 16 units with lunch   -Novolog  Units 16 with dinner  -Increase by 2 units every 2 days to keep 1 hour after meal blood glucose less than 140mg/dL    We reviewed glucose goals of fasting blood glucose <95 mg/dL and 1 hour post prandial blood glucose of <140 mg/dL.    Monitor blood sugar 4 times daily: Fasting  and 1 hour after each meal.  Contact  this clinic [phone] if blood glucose is not within the above-mentioned goals.     We discussed the importance of excellent glycemic control during pregnancy to limit complications such as fetal macrosomia, shoulder dystocia,  hypoglycemia and hyperbilirubinemia.  I have discussed the patient's increased risk of recurrent GDM and/or development of type 2 diabetes later in life.        Instructed to test after all meals and to take insulin as directed.  Time spent with pt today: 25 min with >50% spent in counseling and coordination of care.        Juana Balderas  2018        Lab Results     Hemoglobin A1c   Date Value Ref Range Status   02/15/2018 8.0 (H) 3.5 - 6.0 % Final   10/20/2017 10.8 (H) 3.5 - 6.0 % Final   2017 13.1 (H) 4.2 - 6.1 % Final   2017 8.1 (H) 3.5 - 6.0 % Final     Creatinine   Date Value Ref Range Status   11/15/2017 0.70 0.60 - 1.10 mg/dL Final   10/20/2017 0.83 0.60 - 1.10 mg/dL Final   2017 0.73 0.60 - 1.10 mg/dL Final     Microalbumin, Random Urine   Date Value Ref Range Status   10/20/2017 7.59 (H) 0.00 - 1.99 mg/dL Final       Cholesterol   Date Value Ref Range Status   11/15/2017 235 (H) <=199 mg/dL Final     HDL Cholesterol   Date Value Ref Range Status   11/15/2017 44 (L) >=50 mg/dL Final     LDL Calculated   Date  "Value Ref Range Status   11/15/2017 162 (H) <=129 mg/dL Final     Triglycerides   Date Value Ref Range Status   11/15/2017 143 <=149 mg/dL Final       Lab Results   Component Value Date    ALT 80 (H) 10/20/2017    AST 35 10/20/2017    ALKPHOS 58 10/20/2017    BILITOT 0.7 10/20/2017         Current Medications     Outpatient Medications Prior to Visit   Medication Sig Dispense Refill     alcohol swabs (ALCOHOL PREP SWABS) PadM Use 4 time daily with glucometer 100 each 5     blood glucose test (CONTOUR NEXT STRIPS) strips Use 1 each As Directed 4 (four) times a day. 150 strip 6     generic lancets Use 1 each As Directed 4 (four) times a day. 100 each 6     insulin aspart U-100 (NOVOLOG FLEXPEN U-100 INSULIN) 100 unit/mL injection pen Take 12 units with meals 15 mL 1     insulin detemir U-100 (LEVEMIR FLEXTOUCH U-100 INSULN) 100 unit/mL (3 mL) pen Inject 22 Units under the skin daily. At bedtime (Patient taking differently: Inject 36 Units under the skin daily. At bedtime) 15 mL 1     pen needle, diabetic (BD ULTRA-FINE MARY PEN NEEDLES) 32 gauge x 5/32\" Ndle Use 1 each As Directed 4 (four) times a day. 100 each 6     No facility-administered medications prior to visit.        "

## 2021-06-17 NOTE — PROGRESS NOTES
"[facility]ACE[facility] GDM Care Plan    Assessment: pt seen today for f/u. She was not able to get into pregnancy clinic time. Pt brings in BG log book.   FB-114. She does note sometimes she wakes up sweaty and hungry (about 1x/week or every other week). Therefore, will not increase her levemir. She is at 52 units at HS.   She is eating 2 meals/day, pp readings are slightly elevated 141-146 only about 1x/week. She is taking 18 units with meals.   No changes to regimen today. Pt will f/u in 2 weeks, sooner with any concerns.       Visit Type: Individual Follow-up  Time: 30  Provider: Dr. Diane   Provider's Diagnosis (per referral form): T2 DM in pregnancy   Weight:    Lab Results   Component Value Date    HGBA1C 8.0 (H) 02/15/2018     Estimated Date of Delivery: 18   Pregnancy #: 5  Previous GDM: No   Medications:   Current Outpatient Prescriptions:      alcohol swabs (ALCOHOL PREP SWABS) PadM, Use 4 time daily with glucometer, Disp: 100 each, Rfl: 5     blood glucose test (CONTOUR NEXT STRIPS) strips, Use 1 each As Directed 4 (four) times a day., Disp: 150 strip, Rfl: 6     generic lancets, Use 1 each As Directed 4 (four) times a day., Disp: 100 each, Rfl: 6     insulin aspart U-100 (NOVOLOG FLEXPEN U-100 INSULIN) 100 unit/mL injection pen, Take 12 units with meals (Patient taking differently: Take 16 units with meals), Disp: 15 mL, Rfl: 1     insulin detemir U-100 (LEVEMIR FLEXTOUCH U-100 INSULN) 100 unit/mL (3 mL) pen, Inject 22 Units under the skin daily. At bedtime (Patient taking differently: Inject 46 Units under the skin daily. At bedtime), Disp: 15 mL, Rfl: 1     pen needle, diabetic (BD ULTRA-FINE MARY PEN NEEDLES) 32 gauge x 5/32\" Ndle, Use 1 each As Directed 4 (four) times a day., Disp: 100 each, Rfl: 6    BG monitoring goals: Fasting <95; 1 hour post start of meal <140. Test 4 x per day.  Check fasting a.m. ketones: No  GDM meal pattern/carb counting taught per guidelines: Yes    Past Goals:  Nutrition: GDM meal " plan MOSTLY MET   Activity: Walking after meals when able/staying active MOSTLY MET   Monitoring: BG 4x/day as directed, ketones every morning MET       New Goals:  Nutrition: GDM meal plan   Activity: Walking after meals when able/staying active   Monitoring: BG 4x/day as directed, ketones every morning    DIABETES CARE PLAN AND EDUCATION RECORD    Gestational Diabetes Disease Process/Preconception Care/Management During Pregnancy/Postpartum:Discussed    Meter (per above goals): Discussed    Nutrition Management    Weight: Assessed and Discussed  Portions/Balance: Assessed and Discussed  Carb ID/Count: Assessed and Discussed  Label Reading: Assessed and Discussed  Menu Planning: Assessed and Discussed  Dining Out: Assessed and Discussed  Physical Activity: Assessed and Discussed    Acute Complications: Prevent, Detect, Treat:    Goal Setting and Problem Solving: Assessed and Discussed  Barriers: Assessed and Discussed  Psychosocial Adjustments: Assessed and Discussed      I agree with the aforementioned diabetes plan.  Juana MCCORMICK [facility]  [facility] Endocrinology  5/10/2018  3:56 PM

## 2021-06-17 NOTE — PROGRESS NOTES
"MACE[facility] GDM Care Plan      Assessment/Plan: pt seen today for f/u. She brings in BG log, no meter.   FB, 131, 124, 128, 141, 133, 139, 153. Pt insists these are FBG readings.  After lunch: 132, 166, 173, 174, 136, 176, 146. Pt goes back and forth, saying these are before meal, then saying they are after meal.   She is instructed to check 1 hour after dinner as well. She continues to not do this. Discussed that this reading is important as well.     She is taking 36 units at HS and 12 units with meals. Increased to 46 units at HS and 16 units with meals.         Time: 30  Visit Type: pregnancy clinic   Provider: Dr. Diane   Provider's Diagnosis (per referral form): T2 DM in pregnancy   Lab Results   Component Value Date    HGBA1C 8.0 (H) 02/15/2018   Estimated Date of Delivery: 18  Pregnancy #: 5  Previous GDM: No   Medications:   Current Outpatient Prescriptions:      alcohol swabs (ALCOHOL PREP SWABS) PadM, Use 4 time daily with glucometer, Disp: 100 each, Rfl: 5     blood glucose test (CONTOUR NEXT STRIPS) strips, Use 1 each As Directed 4 (four) times a day., Disp: 150 strip, Rfl: 6     generic lancets, Use 1 each As Directed 4 (four) times a day., Disp: 100 each, Rfl: 6     insulin aspart U-100 (NOVOLOG FLEXPEN U-100 INSULIN) 100 unit/mL injection pen, Take 12 units with meals, Disp: 15 mL, Rfl: 1     insulin detemir U-100 (LEVEMIR FLEXTOUCH U-100 INSULN) 100 unit/mL (3 mL) pen, Inject 22 Units under the skin daily. At bedtime (Patient taking differently: Inject 36 Units under the skin daily. At bedtime), Disp: 15 mL, Rfl: 1     pen needle, diabetic (BD ULTRA-FINE MARY PEN NEEDLES) 32 gauge x 5/32\" Ndle, Use 1 each As Directed 4 (four) times a day., Disp: 100 each, Rfl: 6      BG monitoring goals: Fasting <95; 1 hour post start of meal <140. Test 4 x per day.  Check fasting a.m. ketones: No  GDM meal pattern/carb counting taught per guidelines: Yes    Past Goals:  Nutrition: GDM meal plan MOSTLY MET "   Activity: Walking after meals when able/staying active MOSTLY MET   Monitoring: BG 4x/day as directed, ketones every morning MOSTLY MET       New Goals:  Nutrition: GDM meal plan   Activity: Walking after meals when able/staying active   Monitoring: BG 4x/day as directed, ketones every morning    DIABETES CARE PLAN AND EDUCATION RECORD    Gestational Diabetes Disease Process/Preconception Care/Management During Pregnancy/Postpartum:Discussed    Meter (per above goals): Discussed    Nutrition Management    Weight: Assessed and Discussed  Portions/Balance: Assessed and Discussed  Carb ID/Count: Assessed and Discussed  Label Reading: Assessed and Discussed  Menu Planning: Assessed and Discussed  Dining Out: Assessed and Discussed  Physical Activity: Assessed and Discussed    Acute Complications: Prevent, Detect, Treat:    Goal Setting and Problem Solving: Assessed and Discussed  Barriers: Assessed and Discussed  Psychosocial Adjustments: Assessed and Discussed      I agree with the aforementioned diabetes plan.  Juana Shahidkalyn  [facility] Endocrinology  4/5/2018  9:21 AM

## 2021-06-17 NOTE — PROGRESS NOTES
[facility]  ENDOCRINOLOGY    Gestational Diabetes 2018    Carlos Oliveros, 1990, [id number]          Reason for visit      1. Pregnancy with type 2 diabetes mellitus in second trimester        HPI     Carlos Oliveros is a very pleasant 27 y.o. old female who presents for management of Diabetes Mellitus during pregnancy.  She is currently 31w pregnant . Due date is 18    Current carbohydrate intake:consistent with recommendations of 30g-60g-60g.  I have reviewed her blood glucose logs and note that the:  Fasting readings  are:above range on current regimen  Postprandial readings are:in range on current regimen  Current NPH dose: 46 u  Current Prandial insulin:   Blood glucose logs/meter brought in and data reviewed and incorporated into decision-making.  Planned delivery at: [facility]: [place]   Therapy/Interventions in the past:  She has been seen by the Diabetes Educator- and has received instruction on carbohydrate counting and  consistency.  Records from referring provider and other sources have also been reviewed and incorporated into decision-making.      TODAY:  Carlos Desai returns today in f/u for management of DM 2 during pregnancy.  She is here with a professional .  She has brought in her log book and she is doing a better job with testing.  We are unsure why she is testing before and after dinner, and so is she.  FBS numbers are still too high, and PP readings are better, but need some tweaking.  We will increase both.  She is eating rice with all of her meals, but states that she only eats one bowlful. Baby is moving a lot and she is finally admitting to some swelling.  I noticed this several appointments ago, but today is the first time that she agrees with me.  She tells me that baby is weighing about 4 lbs, but I find this difficult to believe, because she is enormous.  This may just be because of her stature.  She cannot sit upright in a chair, and has to lean back to be able  "to breathe easily.      Past Medical History       Patient Active Problem List   Diagnosis     Type 2 diabetes mellitus treated without insulin     Hyperlipidemia     Pregnancy with type 2 diabetes mellitus in second trimester        Past Surgical History     History reviewed. No pertinent surgical history.    Family History     History reviewed. No pertinent family history.    Social History     Social History   Substance Use Topics     Smoking status: Never Smoker     Smokeless tobacco: Never Used     Alcohol use No       Review of Systems     Patient has no polyuria or polydipsia, no chest pain, dyspnea or TIA's, no numbness, tingling or pain in extremities  Remainder negative except as noted in HPI.      Vital Signs     /70  Ht 4' 11\" (1.499 m)  Wt (!) 231 lb 14.4 oz (105.2 kg)  LMP 11/06/2017  BMI 46.84 kg/m2  Wt Readings from Last 3 Encounters:   04/19/18 (!) 231 lb 14.4 oz (105.2 kg)   04/18/18 (!) 24 lb 8 oz (11.1 kg)   04/05/18 220 lb 8 oz (100 kg)       Physical Exam     GENERAL: Pleasant, alert, appropriate appearance for age. No acute distress,   HEENT: Normocephalic, atraumatic  NECK: Supple, no masses or  lymph nodes.  THYROID: No nodules or enlargement. Non-tender, no bruit  CHEST/RESPIRATORY: Normal chest wall and respirations. Clear to auscultation.  CARDIOVASCULAR: Regular rate and rhythm. S1, S2, no murmur, click, gallop, or rubs.  ABDOMEN: Gravid   LYMPHATIC: No palpable nodes in neck, supraclavicular,  SKIN: No melanosis,  ecchymosis,  vitiligo. No acanthosis nigricans  NEURO:  Non-focal, normal DTRs; no tremor.  PSYCH: Alert and oriented -appropriate affect. Orientation, judgement and memory appear intact.  MSK: No joint abnormalities, FROM in all four extremities. No kyphosis    Assessment     1. Pregnancy with type 2 diabetes mellitus in second trimester        Plan     1. GESTATIONAL DIABETES-  Adjust dose as follows:    -NPH [id number]   units. Increase by 2 units every 2 days to " keep fasting blood glucose below 95mg/dL  -Novolog 18  units with breakfast  -Novolog 18 units with lunch   -Novolog 18 units with dinner  -Increase by 2 units every 2 days to keep 1 hour after meal blood glucose less than 140mg/dL    We reviewed glucose goals of fasting blood glucose <95 mg/dL and 1 hour post prandial blood glucose of <140 mg/dL.    Monitor blood sugar 4 times daily: Fasting  and 1 hour after each meal.  Contact  this clinic [phone] if blood glucose is not within the above-mentioned goals.     We discussed the importance of excellent glycemic control during pregnancy to limit complications such as fetal macrosomia, shoulder dystocia,  hypoglycemia and hyperbilirubinemia.  I have discussed the patient's increased risk of recurrent GDM and/or development of type 2 diabetes later in life.      Carlos Desai will return in two weeks. Time spent with pt today: 25 min with >50% spent in counseling and coordination of care.          Juana Balderas  2018      Lab Results     Hemoglobin A1c   Date Value Ref Range Status   02/15/2018 8.0 (H) 3.5 - 6.0 % Final   10/20/2017 10.8 (H) 3.5 - 6.0 % Final   2017 13.1 (H) 4.2 - 6.1 % Final   2017 8.1 (H) 3.5 - 6.0 % Final     Creatinine   Date Value Ref Range Status   11/15/2017 0.70 0.60 - 1.10 mg/dL Final   10/20/2017 0.83 0.60 - 1.10 mg/dL Final   2017 0.73 0.60 - 1.10 mg/dL Final     Microalbumin, Random Urine   Date Value Ref Range Status   10/20/2017 7.59 (H) 0.00 - 1.99 mg/dL Final       Cholesterol   Date Value Ref Range Status   11/15/2017 235 (H) <=199 mg/dL Final     HDL Cholesterol   Date Value Ref Range Status   11/15/2017 44 (L) >=50 mg/dL Final     LDL Calculated   Date Value Ref Range Status   11/15/2017 162 (H) <=129 mg/dL Final     Triglycerides   Date Value Ref Range Status   11/15/2017 143 <=149 mg/dL Final       Lab Results   Component Value Date    ALT 80 (H) 10/20/2017    AST 35 10/20/2017    ALKPHOS 58  "10/20/2017    BILITOT 0.7 10/20/2017         Current Medications     Outpatient Medications Prior to Visit   Medication Sig Dispense Refill     alcohol swabs (ALCOHOL PREP SWABS) PadM Use 4 time daily with glucometer 100 each 5     blood glucose test (CONTOUR NEXT STRIPS) strips Use 1 each As Directed 4 (four) times a day. 150 strip 6     generic lancets Use 1 each As Directed 4 (four) times a day. 100 each 6     insulin aspart U-100 (NOVOLOG FLEXPEN U-100 INSULIN) 100 unit/mL injection pen Take 12 units with meals (Patient taking differently: Take 16 units with meals) 15 mL 1     insulin detemir U-100 (LEVEMIR FLEXTOUCH U-100 INSULN) 100 unit/mL (3 mL) pen Inject 22 Units under the skin daily. At bedtime (Patient taking differently: Inject 46 Units under the skin daily. At bedtime) 15 mL 1     pen needle, diabetic (BD ULTRA-FINE MARY PEN NEEDLES) 32 gauge x 5/32\" Ndle Use 1 each As Directed 4 (four) times a day. 100 each 6     No facility-administered medications prior to visit.        "

## 2021-06-17 NOTE — PROGRESS NOTES
ASSESMENT AND PLAN:  Diagnoses and all orders for this visit:    Borderline elevated blood pressure reading in pregnancy  I wrote her blood pressure reading on a piece of paper for her today to take to her OB/GYN clinic for her next appointment which is coming up in the next few days.  We discussed indications for follow-up.    Immunization deficiency  Green Card/update imm.  Counseling done on immunization history and requirements with the patient.  Reviewed available immunization history and relevant lab records with patient and family.  Immunizations given as documented in the EHR and on form.  Acetaminophen as needed for post-immunization fever or myalgias.  [employer] and [employer] form I-693 completed today with the patient.  Given to patient in a sealed labeled envelope and a copy is being scanned into the EHR.  Please see that form for further details.  Patient directed to follow-up in clinic for routine and preventative care.           SUBJECTIVE: 19-year-old female comes in today for her green card exam.  She is currently pregnant.  No recent fevers.  No history of immunization reactions or problems in the past.  No headaches or abdominal pain.    No past medical history on file.  Patient Active Problem List   Diagnosis     Type 2 diabetes mellitus treated without insulin     Hyperlipidemia     Pregnancy with type 2 diabetes mellitus in second trimester     Current Outpatient Prescriptions   Medication Sig Dispense Refill     insulin aspart U-100 (NOVOLOG FLEXPEN U-100 INSULIN) 100 unit/mL injection pen Take 12 units with meals (Patient taking differently: Take 16 units with meals) 15 mL 1     insulin detemir U-100 (LEVEMIR FLEXTOUCH U-100 INSULN) 100 unit/mL (3 mL) pen Inject 22 Units under the skin daily. At bedtime (Patient taking differently: Inject 46 Units under the skin daily. At bedtime) 15 mL 1     alcohol swabs (ALCOHOL PREP SWABS) PadM Use 4 time daily with glucometer 100 each 5  "    blood glucose test (CONTOUR NEXT STRIPS) strips Use 1 each As Directed 4 (four) times a day. 150 strip 6     generic lancets Use 1 each As Directed 4 (four) times a day. 100 each 6     pen needle, diabetic (BD ULTRA-FINE MARY PEN NEEDLES) 32 gauge x 5/32\" Ndle Use 1 each As Directed 4 (four) times a day. 100 each 6     No current facility-administered medications for this visit.      History   Smoking Status     Never Smoker   Smokeless Tobacco     Never Used       OBJECTICE: /82 (Patient Site: Right Arm, Patient Position: Sitting, Cuff Size: Adult Large)  Pulse 72  Temp 99.2  F (37.3  C) (Oral)   Resp 24  Ht 4' 11\" (1.499 m)  Wt (!) 24 lb 8 oz (11.1 kg)  LMP 11/06/2017  Breastfeeding? No  BMI 4.95 kg/m2     No results found for this or any previous visit (from the past 24 hour(s)).     GEN-alert, appropriate, in no apparent distress   CV-regular rate and rhythm with no murmur   RESP-lungs clear to auscultation   EXTREM-warm, no edema     Damian P De Peyster          "

## 2021-06-18 NOTE — ANESTHESIA POSTPROCEDURE EVALUATION
Patient: Carlos Oliveros   SECTION  Anesthesia type: spinal    Patient location: Labor and Delivery  Last vitals:   Vitals:    18 2135   BP: 137/78   Pulse: 84   Resp: 20   Temp:    SpO2:      Post vital signs: stable  Level of consciousness: awake and responds to simple questions  Post-anesthesia pain: pain controlled  Post-anesthesia nausea and vomiting: no  Pulmonary: unassisted, return to baseline  Cardiovascular: stable and blood pressure at baseline  Hydration: adequate  Anesthetic events: no    QCDR Measures:  ASA# 11 - Ana-op Cardiac Arrest: ASA11B - Patient did NOT experience unanticipated cardiac arrest  ASA# 12 - Ana-op Mortality Rate: ASA12B - Patient did NOT die  ASA# 13 - PACU Re-Intubation Rate: ASA13B - Patient did NOT require a new airway mgmt  ASA# 10 - Composite Anes Safety: ASA10A - No serious adverse event    Additional Notes:

## 2021-06-18 NOTE — ANESTHESIA CARE TRANSFER NOTE
Last vitals:   Vitals:    06/14/18 1949   BP: 111/57   Pulse: 65   Resp: 12   Temp: 36.2  C (97.2  F)   SpO2: 98%     Patient's level of consciousness is awake and drowsy  Spontaneous respirations: yes  Maintains airway independently: yes  Dentition unchanged: yes  Oropharynx: oropharynx clear of all foreign objects    QCDR Measures:  ASA# 20 - Surgical Safety Checklist: WHO surgical safety checklist completed prior to induction  PQRS# 430 - Adult PONV Prevention: 4558F - Pt received => 2 anti-emetic agents (different classes) preop & intraop  ASA# 8 - Peds PONV Prevention: NA - Not pediatric patient, not GA or 2 or more risk factors NOT present  PQRS# 424 - Ana-op Temp Management: 4559F - At least one body temp DOCUMENTED => 35.5C or 95.9F within required timeframe  PQRS# 426 - PACU Transfer Protocol: - Transfer of care checklist used  ASA# 14 - Acute Post-op Pain: ASA14B - Patient did NOT experience pain >= 7 out of 10

## 2021-06-18 NOTE — ANESTHESIA PREPROCEDURE EVALUATION
Anesthesia Evaluation      Patient summary reviewed   No history of anesthetic complications     Airway   Mallampati: IV  Neck ROM: full   Pulmonary - negative ROS and normal exam    breath sounds clear to auscultation                         Cardiovascular - normal exam  (+) hypertension poorly controlled, ,     Rhythm: regular  Rate: normal,         Neuro/Psych - negative ROS     Endo/Other    (+) diabetes mellitus (gestational) poorly controlled using insulin, obesity (M.O. BMI 49),      GI/Hepatic/Renal    (+) GERD,        Other findings: Pre-eclampsia, normal labs      Dental    (+) poor dentition                       Anesthesia Plan  Planned anesthetic: spinal and ITN  Duramorph requested by OB and discussed via  with patient  ASA 4 - emergent     Anesthetic plan and risks discussed with: patient and  services used  Anesthesia plan special considerations: IV therapy two IVs,

## 2021-06-18 NOTE — ANESTHESIA PROCEDURE NOTES
Spinal Block    Patient location during procedure: OR  Start time: 6/14/2018 6:36 PM  End time: 6/14/2018 6:40 PM  Reason for block: primary anesthetic    Staffing:  Performing  Anesthesiologist: LAKEISHA LAUREN    Preanesthetic Checklist  Completed: patient identified, risks, benefits, and alternatives discussed, timeout performed, consent obtained, airway assessed, oxygen available, suction available, emergency drugs available and hand hygiene performed  Spinal Block  Patient position: sitting  Prep: ChloraPrep and site prepped and draped  Patient monitoring: heart rate, cardiac monitor, continuous pulse ox and blood pressure  Approach: midline  Location: L3-4  Injection technique: single-shot  Needle type: pencil-tip   Needle gauge: 24 G

## 2021-06-19 NOTE — PROGRESS NOTES
ASSESMENT AND PLAN:  Diagnoses and all orders for this visit:    Type 2 diabetes mellitus treated without insulin (H)  -     Basic Metabolic Panel  -     Microalbumin, Random Urine  -     Lipid Cascade    A1c 6.91 month ago.  Switch back to metformin from insulin.  Follow-up in 2 weeks with blood sugar log.    Hyperlipidemia  She is not on any cholesterol medication currently.  Recheck lipids today.   We will need to restart medication.  Prescription sent.  Advised to bring all pill bottles at next visit.  Last .    Essential hypertension  -     lisinopril (PRINIVIL,ZESTRIL) 20 MG tablet; Take 1 tablet (20 mg total) by mouth daily.  Dispense: 90 tablet; Refill: 3    Noncompliance with medication regimen  Advised to bring all pill bottles along with her meter.  She will follow-up in 2 weeks.  Referred to our diabetic educator.      SUBJECTIVE: Carlos Oliveros is a 27-year-old female with history of uncontrolled diabetes, hyperlipidemia and recent pregnancy complicated with preeclampsia here for follow-up.  She delivered a baby boy 13 days ago by  section due to preeclampsia.  States she is doing well.  She states that she is doing well, incision is healing well.  She is not breast-feeding.  She was on insulin during pregnancy, was switched back to oral metformin after delivery.  She did not  her medications immediately, restarted her medication only a week ago.  She states she is checking her blood sugar daily but did not bring her meter.  She was not able to recall the numbers either.  She was restarted on metformin 1000 mg twice a day and labetalol 200 mg twice a day.  She states she is taking her blood pressure medication 3 times a day.  He has no complaints or concerns today.  Denied hypoglycemia symptoms.  Denied shortness of breath or palpitations.  Denied chest pain.  Denied acute fever or URI symptoms.    Past Medical History:   Diagnosis Date     Diabetes mellitus (H)     IDGDM vs Type 2      "Hypertension     gestational hypertension     Patient Active Problem List   Diagnosis     Type 2 diabetes mellitus treated without insulin (H)     Hyperlipidemia     Pregnancy with type 2 diabetes mellitus in second trimester     Severe pre-eclampsia     Gestational hypertension     Breech presentation       Allergies:    Allergies   Allergen Reactions     No Known Drug Allergies        History   Smoking Status     Never Smoker   Smokeless Tobacco     Never Used       Review of systems otherwise negative except as listed in HPI.   History   Smoking Status     Never Smoker   Smokeless Tobacco     Never Used       OBJECTICE: BP (!) 154/96  Pulse 72  Temp 99.2  F (37.3  C) (Oral)   Resp 21  Ht 4' 11\" (1.499 m)  Wt 211 lb 4 oz (95.8 kg)  LMP 11/06/2017  BMI 42.67 kg/m2        GEN-alert,  in no apparent distress.  HEENT-mucous membranes are moist, neck is supple.  CV-regular rate and rhythm with no murmur.   RESP-lungs clear to auscultation .  ABDOMEN- Soft , not tender.  EXTREM- No open lesions or ulcers. Sensation intact.   SKIN-normal    This transcription uses voice recognition software, which may contain typographical errors.        Juan David Coppola   6/28/2018     "

## 2021-06-19 NOTE — LETTER
Letter by Juan David Coppola MD at      Author: Juan David Coppola MD Service: -- Author Type: --    Filed:  Encounter Date: 10/3/2019 Status: Signed         Carlos Giselle Oliveros  [address]  [place] [address]                     October 3, 2019    Dear Eh:    At the [facility], we care about you and your health. When diagnosed early, many diseases and illness can be treated and possibly prevented. That's why it is important to see your primary care provider regularly for routine examinations and to maintain your overall health.We are trying to contact you to ensure you receive the best level of care. Our records show that you are overdue for a Diabetes Follow Up.  Please contact our office at [phone] to schedule an appointment.  If you are receiving care elsewhere, please contact us so that we can update our records.   Thank you for trusting us with your care.       If you have any questions or concerns, please don't hesitate to call.    Sincerely,  [facility] Staff  (Formerly [facility])      Electronically signed by Juan David Coppola MD

## 2021-06-19 NOTE — LETTER
Letter by Ashley Brown PharmD at      Author: Ashley Brown PharmD Service: -- Author Type: --    Filed:  Encounter Date: 7/8/2019 Status: (Other)          Giselle Lae  [address]  [place] [address]                     July 8, 2019    Dear Eh:    At the [facility], we care about you and your health. When diagnosed early, many diseases and illness can be treated and possibly prevented. That's why it is important to see your primary care provider regularly for routine examinations and to maintain your overall health.We are trying to contact you to ensure you receive the best level of care. Based on our records you would benefit in seeing our clinic Pharmacist.  Please contact our office at [phone] to schedule an initial visit appointment.  If you are receiving care elsewhere, please contact us so that we can update our records.   Thank you for trusting us with your care.       If you have any questions or concerns, please don't hesitate to call.    Sincerely,        Electronically signed by Ashley Brown PharmD

## 2021-06-20 NOTE — LETTER
Letter by Katharine Guzman CHW at      Author: Katharine Guzman CHW Service: -- Author Type: --    Filed:  Encounter Date: 1/17/2020 Status: Signed         Dear Carlos Oliveros,                                                                         You were recently referred to the [facility]'s Clinic Care Coordination service.  This is a service offered through your Primary Care Clinic which can help you access resources, services in regard to your health and well-being goals. The clinic Community Health Worker has placed two calls to you to discuss the nature of this service and to offer enrollment.  If you are interested in learning more about Clinic Care Coordination, please call your Primary Care Clinic's Community Health Worker, Naresh, at [phone].                                                    Sincerely,                                                                              NIGEL Lau                                                                                          Clinic Care Coordination                                                  [facility]

## 2021-06-20 NOTE — PROGRESS NOTES
Patient came in for flu vaccine today. Vaccine was administered, pt tolerated. Kellie  used: Darin Marquez CMA

## 2021-06-22 NOTE — TELEPHONE ENCOUNTER
Patient is due for Diabetic check. Called patient via  line and help patient schedule appointment for February 1 at 920 am patient requested transportation.

## 2021-07-03 NOTE — ADDENDUM NOTE
Addendum Note by Teresa Martel CMA at 5/10/2019  9:40 AM     Author: Teresa Martel CMA Service: -- Author Type: Certified Medical Assistant    Filed: 6/6/2019  9:54 AM Encounter Date: 5/10/2019 Status: Signed    : Teresa Martel CMA (Certified Medical Assistant)    Addended by: TERESA MARTEL on: 6/6/2019 09:54 AM        Modules accepted: Orders

## 2021-10-07 ENCOUNTER — IMMUNIZATION (OUTPATIENT)
Dept: FAMILY MEDICINE | Facility: CLINIC | Age: 31
End: 2021-10-07
Payer: COMMERCIAL

## 2021-10-07 PROCEDURE — 90686 IIV4 VACC NO PRSV 0.5 ML IM: CPT

## 2021-10-07 PROCEDURE — 90471 IMMUNIZATION ADMIN: CPT

## 2021-10-08 ENCOUNTER — MEDICAL CORRESPONDENCE (OUTPATIENT)
Dept: HEALTH INFORMATION MANAGEMENT | Facility: CLINIC | Age: 31
End: 2021-10-08

## 2021-10-08 ENCOUNTER — OFFICE VISIT (OUTPATIENT)
Dept: FAMILY MEDICINE | Facility: CLINIC | Age: 31
End: 2021-10-08
Payer: COMMERCIAL

## 2021-10-08 VITALS
DIASTOLIC BLOOD PRESSURE: 86 MMHG | SYSTOLIC BLOOD PRESSURE: 128 MMHG | OXYGEN SATURATION: 98 % | HEART RATE: 92 BPM | RESPIRATION RATE: 16 BRPM | HEIGHT: 59 IN | WEIGHT: 185.56 LBS | TEMPERATURE: 99.1 F | BODY MASS INDEX: 37.41 KG/M2

## 2021-10-08 DIAGNOSIS — E66.01 MORBID OBESITY (H): ICD-10-CM

## 2021-10-08 DIAGNOSIS — E11.9 TYPE 2 DIABETES MELLITUS TREATED WITHOUT INSULIN (H): ICD-10-CM

## 2021-10-08 DIAGNOSIS — Z32.01 PREGNANCY TEST POSITIVE: ICD-10-CM

## 2021-10-08 DIAGNOSIS — N91.2 ABSENCE OF MENSTRUATION: Primary | ICD-10-CM

## 2021-10-08 DIAGNOSIS — Z11.1 SCREENING EXAMINATION FOR PULMONARY TUBERCULOSIS: ICD-10-CM

## 2021-10-08 PROBLEM — Z33.1 PREGNANT STATE, INCIDENTAL: Status: RESOLVED | Noted: 2021-10-08 | Resolved: 2021-10-08

## 2021-10-08 PROBLEM — Z33.1 PREGNANT STATE, INCIDENTAL: Status: ACTIVE | Noted: 2021-10-08

## 2021-10-08 LAB
ALBUMIN SERPL-MCNC: 3.9 G/DL (ref 3.5–5)
ALP SERPL-CCNC: 62 U/L (ref 45–120)
ALT SERPL W P-5'-P-CCNC: 28 U/L (ref 0–45)
ANION GAP SERPL CALCULATED.3IONS-SCNC: 11 MMOL/L (ref 5–18)
AST SERPL W P-5'-P-CCNC: 20 U/L (ref 0–40)
BILIRUB SERPL-MCNC: 0.7 MG/DL (ref 0–1)
BUN SERPL-MCNC: 7 MG/DL (ref 8–22)
CALCIUM SERPL-MCNC: 10.1 MG/DL (ref 8.5–10.5)
CHLORIDE BLD-SCNC: 100 MMOL/L (ref 98–107)
CHOLEST SERPL-MCNC: 226 MG/DL
CO2 SERPL-SCNC: 20 MMOL/L (ref 22–31)
CREAT SERPL-MCNC: 0.71 MG/DL (ref 0.6–1.1)
CREAT UR-MCNC: 25 MG/DL
FASTING STATUS PATIENT QL REPORTED: YES
GFR SERPL CREATININE-BSD FRML MDRD: >90 ML/MIN/1.73M2
GLUCOSE BLD-MCNC: 322 MG/DL (ref 70–125)
HBA1C MFR BLD: 11.9 % (ref 0–5.6)
HCG UR QL: POSITIVE
HDLC SERPL-MCNC: 50 MG/DL
LDLC SERPL CALC-MCNC: 124 MG/DL
MICROALBUMIN UR-MCNC: 9.88 MG/DL (ref 0–1.99)
MICROALBUMIN/CREAT UR: 395.2 MG/G CR
POTASSIUM BLD-SCNC: 4.4 MMOL/L (ref 3.5–5)
PROT SERPL-MCNC: 8.2 G/DL (ref 6–8)
SODIUM SERPL-SCNC: 131 MMOL/L (ref 136–145)
TRIGL SERPL-MCNC: 262 MG/DL

## 2021-10-08 PROCEDURE — 80061 LIPID PANEL: CPT | Performed by: FAMILY MEDICINE

## 2021-10-08 PROCEDURE — 83036 HEMOGLOBIN GLYCOSYLATED A1C: CPT | Performed by: FAMILY MEDICINE

## 2021-10-08 PROCEDURE — 82043 UR ALBUMIN QUANTITATIVE: CPT | Performed by: FAMILY MEDICINE

## 2021-10-08 PROCEDURE — 99214 OFFICE O/P EST MOD 30 MIN: CPT | Performed by: FAMILY MEDICINE

## 2021-10-08 PROCEDURE — 36415 COLL VENOUS BLD VENIPUNCTURE: CPT | Performed by: FAMILY MEDICINE

## 2021-10-08 PROCEDURE — 81025 URINE PREGNANCY TEST: CPT | Performed by: FAMILY MEDICINE

## 2021-10-08 PROCEDURE — 86481 TB AG RESPONSE T-CELL SUSP: CPT | Performed by: FAMILY MEDICINE

## 2021-10-08 PROCEDURE — 80053 COMPREHEN METABOLIC PANEL: CPT | Performed by: FAMILY MEDICINE

## 2021-10-08 RX ORDER — GLUCOSAMINE HCL/CHONDROITIN SU 500-400 MG
CAPSULE ORAL
Qty: 100 EACH | Refills: 3 | Status: SHIPPED | OUTPATIENT
Start: 2021-10-08 | End: 2022-02-09

## 2021-10-08 RX ORDER — LANCETS
EACH MISCELLANEOUS
Qty: 200 EACH | Refills: 11 | Status: SHIPPED | OUTPATIENT
Start: 2021-10-08 | End: 2021-12-29

## 2021-10-08 RX ORDER — SWAB
1 SWAB, NON-MEDICATED MISCELLANEOUS DAILY
Qty: 90 TABLET | Refills: 3 | Status: SHIPPED | OUTPATIENT
Start: 2021-10-08

## 2021-10-08 ASSESSMENT — MIFFLIN-ST. JEOR: SCORE: 1457.34

## 2021-10-08 NOTE — PROGRESS NOTES
ASSESMENT AND PLAN:  Absence of menstruation  - HCG qualitative urine  Positive pregnancy test.    Screening examination for pulmonary tuberculosis  - Quantiferon-TB Gold Plus    Type 2 diabetes mellitus treated without insulin (H)  I spent significant amount of time explaining the patient the importance of good glycemic control especially while pregnant.    She finally agreed to start insulin.  New meter and testing supplies sent.  Refer to Endo.    - Hemoglobin A1c; Future  - Lipid panel  - Comprehensive metabolic panel (BMP + Alb, Alk Phos, ALT, AST, Total. Bili, TP); Future  - Albumin Random Urine Quantitative with Creat Ratio; Future  - Hemoglobin A1c  - Comprehensive metabolic panel (BMP + Alb, Alk Phos, ALT, AST, Total. Bili, TP)  - Albumin Random Urine Quantitative with Creat Ratio  - blood glucose monitoring (NO BRAND SPECIFIED) meter device kit; Use to test blood sugar two times daily or as directed. Preferred blood glucose meter OR supplies to accompany: Blood Glucose Monitor Brands: per insurance.  - blood glucose (NO BRAND SPECIFIED) test strip; Use to test blood sugar 2  times daily or as directed. To accompany: Blood Glucose Monitor Brands: per insurance.  - thin (NO BRAND SPECIFIED) lancets; Use with lanceting device twice a day. To accompany: Blood Glucose Monitor Brands: per insurance.  - alcohol swab prep pads; Use to swab area of injection/dennise as directed.  - insulin glargine (LANTUS PEN) 100 UNIT/ML pen; Inject 10 Units Subcutaneous At Bedtime  - insulin pen needle (32G X 4 MM) 32G X 4 MM miscellaneous; Use one pen needles daily or as directed.  - Adult Endocrinology Referral; Future    Pregnancy test positive  Will likely need referral to OB due to uncontrolled diabetes and previous history of preeclampsia.  - Prenatal Vit-Fe Fumarate-FA (PRENATAL MULTIVITAMIN  WITH IRON) 28-0.8 MG TABS; Take 1 tablet by mouth daily  - US OB < 14 Weeks Single; Future    Morbid obesity (H)  The following high  BMI interventions were performed this visit: encouragement to exercise and weight monitoring    This transcription uses voice recognition software, which may contain typographical errors.      SUBJECTIVE: Carlso Oliveros is a 31-year-old female with history of uncontrolled type 2 diabetes, noncompliance with medication, obesity and hyperlipidemia here for pregnancy confirmation and TB test.  She took herself off of medication multiple times in the past.  She no showed multiple times.  She was referred to endocrinology in the past but did not follow-up with Endo.  She she is not checking her blood sugar.      She thinks she is pregnant but did not do home pregnancy test.  LMP 2021 but not sure.  See she states her menstrual cycles are regular prior to July.  She has 5 children 4 delivered normally.  Her last pregnancy was complicated with preeclampsia and had .  .  She is not taking any medications currently.    She states she is feeling fine, no morning sickness.  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      Past Medical History:   Diagnosis Date     Diabetes mellitus (H)     IDGDM vs Type 2     Hypertension     gestational hypertension     Patient Active Problem List   Diagnosis     Type 2 diabetes mellitus treated without insulin (H)     Hyperlipidemia     Pregnancy with type 2 diabetes mellitus in second trimester     Severe pre-eclampsia     Gestational hypertension     Breech presentation     Morbid obesity (H)       Allergies:    Allergies   Allergen Reactions     No Known Drug Allergies Unknown       History   Smoking Status     Never Smoker   Smokeless Tobacco     Never Used       Review of systems otherwise negative except as listed in HPI.   History   Smoking Status     Never Smoker   Smokeless Tobacco     Never Used       OBJECTICE: /86 (BP Location: Left arm, Patient Position: Sitting, Cuff Size: Adult Regular)   Pulse 92   Temp 99.1  F (37.3  C) (Oral)   Resp 16   Ht 1.499 m (4'  "11\")   Wt 84.2 kg (185 lb 9 oz)   LMP 07/28/2021 (Exact Date)   SpO2 98%   BMI 37.48 kg/m      DATA REVIEWED:  Labs Reviewed or Ordered (1):       GEN-alert,  in no apparent distress.  Weight is normal.  HEENT- neck is supple.  CV-regular rate and rhythm with no murmur.   RESP-lungs clear to auscultation .  ABDOMEN- Soft , not tender.  EXTREM- No edema.  No open lesions or ulcers.  Sensation intact.  SKIN-normal        Juan David Coppola MD   10/8/2021   "

## 2021-10-11 LAB
GAMMA INTERFERON BACKGROUND BLD IA-ACNC: 0.22 IU/ML
M TB IFN-G BLD-IMP: POSITIVE
M TB IFN-G CD4+ BCKGRND COR BLD-ACNC: 1.11 IU/ML
MITOGEN IGNF BCKGRD COR BLD-ACNC: 0.01 IU/ML
MITOGEN IGNF BCKGRD COR BLD-ACNC: 0.55 IU/ML
QUANTIFERON MITOGEN: 1.33 IU/ML
QUANTIFERON NIL TUBE: 0.22 IU/ML
QUANTIFERON TB1 TUBE: 0.23 IU/ML
QUANTIFERON TB2 TUBE: 0.77

## 2021-10-12 ENCOUNTER — TELEPHONE (OUTPATIENT)
Dept: FAMILY MEDICINE | Facility: CLINIC | Age: 31
End: 2021-10-12

## 2021-10-12 DIAGNOSIS — O09.90 HIGH-RISK PREGNANCY, UNSPECIFIED TRIMESTER: Primary | ICD-10-CM

## 2021-10-12 DIAGNOSIS — R76.12 POSITIVE QUANTIFERON-TB GOLD TEST: Primary | ICD-10-CM

## 2021-10-12 NOTE — TELEPHONE ENCOUNTER
Pt saw Dr. Coppola on Friday for pregnancy confirmation.  Pt is pregnant but also is an uncontrolled diabetic - A1C was 9.4.  Also her TB Gold test for her future employer, [employer] was positive.      Dr. Coppola had referred pt to outside OB for high risk pregnancy.  Pt has hx of pre-eclampsia in previous pregnancies.  [facility] has not notified the [facility] nor have they contacted pt.  Also [facility] has not sent positive TB Gold report to her future employer.  Pt does have an apt - IOB with provider on 10/28 at 1010 am.  That appt is 2 weeks away and per Dr. Coppola's notes - unable to determine pt due date or how far along she is.  Last menses was in July.  Pt has lots of questions especially about the positive TB GOLD test results and the meds.  Pt seems disappointed that she will not be able to work due to the positive TB test.     What does provider think is best?  [facility] told pt would call her tomorrow with an update.      PLEASE HOLD this message for provider tomorrow .  Thanks

## 2021-10-12 NOTE — TELEPHONE ENCOUNTER
I agree with Dr. Coppola's plan to refer patient to OB. Dr. Coppola has placed order- this patient needs urgent Ob-GYN referral to establish care for this high risk pregnancy. Can TC's please assist with this referral since it is high risk and needs to be completed within 48 hours?    In terms of the positive quant gold test, I will defer to PCP. Would likely need referral to TB clinic, but unclear if she has ever had positive TB quant gold in the past. She will need further evaluation. Will route to PCP and also specialty  pool to assist with OB referral as detailed above. Thanks!

## 2021-10-12 NOTE — TELEPHONE ENCOUNTER
The patient was referred to OB/GYN for high risk pregnancy care.  Please notify the patient.    Dr. Juan David oCppola  10/12/2021 9:18 AM

## 2021-10-13 NOTE — TELEPHONE ENCOUNTER
Called pt to relay [facility] fax[facility] yesterday to them.  Pt needs to see them ASAP and also to the TB clinic for positive TB.     [facility].  Pt has not heard from TB clinic yet.  Thanks.

## 2021-10-18 ENCOUNTER — HOSPITAL ENCOUNTER (OUTPATIENT)
Dept: ULTRASOUND IMAGING | Facility: HOSPITAL | Age: 31
Discharge: HOME | End: 2021-10-18
Attending: FAMILY MEDICINE | Admitting: FAMILY MEDICINE
Payer: COMMERCIAL

## 2021-10-18 DIAGNOSIS — Z32.01 PREGNANCY TEST POSITIVE: ICD-10-CM

## 2021-10-18 PROCEDURE — 76801 OB US < 14 WKS SINGLE FETUS: CPT

## 2021-10-19 ENCOUNTER — TELEPHONE (OUTPATIENT)
Dept: FAMILY MEDICINE | Facility: CLINIC | Age: 31
End: 2021-10-19

## 2021-10-19 NOTE — TELEPHONE ENCOUNTER
Help patient schedule appointment today referral was sent to [facility] tb clinic on 10/12/2021. Patient decline transportation appointment made for 10/26/2021 @9:40 am at [address], [place] [address]

## 2021-10-19 NOTE — TELEPHONE ENCOUNTER
Patient is here wondering no one call her back from the TB clinic, Patient is here today take her father to his appt to see the provider . One of the medical Assistance will give her TB Clinic number for her to call.

## 2021-10-26 ENCOUNTER — TRANSFERRED RECORDS (OUTPATIENT)
Dept: HEALTH INFORMATION MANAGEMENT | Facility: CLINIC | Age: 31
End: 2021-10-26

## 2021-11-01 ENCOUNTER — DOCUMENTATION ONLY (OUTPATIENT)
Dept: ENDOCRINOLOGY | Facility: CLINIC | Age: 31
End: 2021-11-01

## 2021-11-01 NOTE — PROGRESS NOTES
No Show/Unable to contact patient    Attempted to call multiple phone #s listed in Epic using the Kellie Language Line. Voicemail left. The following are my notes. I was able to review her chart in detail in anticipation of the appointment.     I will let her PCP know and we can facilitate an Endocrine appointment with myself or a colleague ASAP.     Subjective:    New patient    Carlos Oliveros is a 31 year old female who presents to review glycemic management during pregnancy.     OB US 10/18/2021: Single living intrauterine gestation at 11 weeks and 2 days, EDC 05/07/2022.    She did see Tanja Xie in 2018 during pregnancy and her most recent note from 4/19/2018 during the third trimester has insulin dosing at: NPH 0-0-0-52, NovoLog 18 U with meals     History of pre-eclampsia in prior pregnancies.     Glycemic control over the years:               # HbA1c 11.9% 10/2021  # Macroalbuminuria  -10/2021: GFR >90, urine microalbumin 395.2 mg/g Cr (and this is stable at least over the past 5 months or so on multiple checks and she does have long-standing microalbuminuria dating back to 2018)  # Mixed hyperlipidemia  -10/2021: , HDL 50, ,

## 2021-11-02 ENCOUNTER — TELEPHONE (OUTPATIENT)
Dept: FAMILY MEDICINE | Facility: CLINIC | Age: 31
End: 2021-11-02

## 2021-11-02 ENCOUNTER — TELEPHONE (OUTPATIENT)
Dept: ENDOCRINOLOGY | Facility: CLINIC | Age: 31
End: 2021-11-02

## 2021-11-02 NOTE — TELEPHONE ENCOUNTER
Called patient back via  Jennifer to confirm appointment on 1/12/2022 @ 3 pm  which patient wants to reschedule for early appointment. Appointment was reschedule for 1/12/2022 @ 11 am patient decline transportation. Appointment letter was mail out home with address.

## 2021-11-02 NOTE — TELEPHONE ENCOUNTER
Pt came in and said that she went to [facility] to get an ultrasound done and they stated that there was no order. She came into the clinic to see if Dr Colmenares would put in the order.

## 2021-11-02 NOTE — TELEPHONE ENCOUNTER
[facility]    Phone Message    May a detailed message be left on voicemail: no     Reason for Call: Other: Pt missed appt to be seen for her type 2 diabetes during current pregnancy. Per Teresa with [facility] Pt needed to be rescheduled for appt and appt needed to be in person and with a female provider. (see referral attached to 11/1/21 missed appt) Writer scheduled soonest available appt per Pt preferences and added to waitlist. Writer also informed Teresa with [facility] that per guidelines Pt is also supposed to be scheduled for a diabetes educator appt as well. Teresa with [facility] requested that Pt first have appt with endocrinologist, and then after schedule with diabetic educator. Call ended up being disconnected while scheduling. Writer is unsure how, but while scheduling this call had multiple people on the line (writer, Teresa with [facility], , and Pt). Sending as FYI to clinic. Please follow up with Pt if needed.     Action Taken: Message routed to:  Other: endocrine    Travel Screening: Not Applicable

## 2021-11-09 ENCOUNTER — TELEPHONE (OUTPATIENT)
Dept: FAMILY MEDICINE | Facility: CLINIC | Age: 31
End: 2021-11-09
Payer: COMMERCIAL

## 2021-11-09 NOTE — TELEPHONE ENCOUNTER
Per  patient need to be seen at [facility] information was given to patient today during daughter visit with appointment date and address via  Jennifer.

## 2021-11-11 NOTE — TELEPHONE ENCOUNTER
Patient no show to appointment on November 11, October 14 and 15. I reschedule appointment for 11/18/2021at 11:30 am at [facility] [address], [place], [address].

## 2021-12-01 ENCOUNTER — LAB (OUTPATIENT)
Dept: LAB | Facility: CLINIC | Age: 31
End: 2021-12-01
Payer: COMMERCIAL

## 2021-12-01 ENCOUNTER — OFFICE VISIT (OUTPATIENT)
Dept: ENDOCRINOLOGY | Facility: CLINIC | Age: 31
End: 2021-12-01
Attending: FAMILY MEDICINE
Payer: COMMERCIAL

## 2021-12-01 VITALS
DIASTOLIC BLOOD PRESSURE: 80 MMHG | WEIGHT: 193.9 LBS | HEART RATE: 88 BPM | SYSTOLIC BLOOD PRESSURE: 120 MMHG | BODY MASS INDEX: 39.16 KG/M2

## 2021-12-01 DIAGNOSIS — E11.9 TYPE 2 DIABETES MELLITUS TREATED WITHOUT INSULIN (H): ICD-10-CM

## 2021-12-01 LAB — HBA1C MFR BLD: 9.1 % (ref 0–5.6)

## 2021-12-01 PROCEDURE — 36415 COLL VENOUS BLD VENIPUNCTURE: CPT

## 2021-12-01 PROCEDURE — 83036 HEMOGLOBIN GLYCOSYLATED A1C: CPT

## 2021-12-01 PROCEDURE — 99205 OFFICE O/P NEW HI 60 MIN: CPT | Performed by: INTERNAL MEDICINE

## 2021-12-01 NOTE — LETTER
2021         RE: Carlos Oliveros  [address]  [place] [address]        Dear Colleague,    Thank you for referring your patient, Carlos Oliveros, to the [facility]. Please see a copy of my visit note below.      ENDOCRINOLOGY NEW PATIENT VISIT        HISTORY OF PRESENT ILLNESS    Carlos Oliveros is seen in consultation at the request of Juan David Coppola for diabetes mellitus type 2 in pregnancy.    The patient has history of gestational diabetes and followed with Ms. Sherrie NP, in our clinic during her last pregnancy in 2018.  At that time, she was treated with NPH and NovoLog insulin.  She is  and has history of preeclampsia.    Patient is at 17w4d.OB ultrasound on 10/18/2021 showed IUG at 11 weeks and 2 days with EDC of 2022.    She notes that she has felt well thus far during pregnancy.  Eating 1-2 meals per day, plus snacking (often snacks on fruit or may have Ovaltine with milk).  Does not drink soda.  Does not drink juice.  Cannot give specifics on meals, but does note that today she had rice for breakfast (a typical meal for her) and last night she had a noodle dish.    She was checking glucose once daily but ran out of test strips 2 weeks ago and stopped monitoring altogether.  She was checking once daily, alternating between fasting and later in the day.  She recalls fasting glucose values were as low as 110s, but sometimes higher.  She checked her glucose randomly later in the day and recalls values in the 200s with some regularity.    Current diabetes regimen: Lantus 18 units nightly (started on 10/8/2021 by Dr. Coppola).    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    PAST MEDICAL HISTORY  Past Medical History:   Diagnosis Date     Diabetes mellitus (H)     IDGDM vs Type 2     Hypertension     gestational hypertension        MEDICATIONS  Current Outpatient Medications   Medication Sig Dispense Refill     alcohol swab prep pads Use to swab area of injection/dennise as directed. 100 each 3     blood glucose (NO BRAND SPECIFIED) test strip Use to test blood sugar 2  times daily or as directed. To accompany: Blood Glucose Monitor Brands: per insurance. 200 strip 11     blood glucose monitoring (NO BRAND SPECIFIED) meter device kit Use to test blood sugar two times daily or as directed. Preferred blood glucose meter OR supplies to accompany: Blood Glucose Monitor Brands: per insurance. 1 kit 0     insulin glargine (LANTUS PEN) 100 UNIT/ML pen Inject 10 Units Subcutaneous At Bedtime 15 mL 11     insulin pen needle (32G X 4 MM) 32G X 4 MM miscellaneous Use one pen needles daily or as directed. 200 each 11     Prenatal Vit-Fe Fumarate-FA (PRENATAL MULTIVITAMIN  WITH IRON) 28-0.8 MG TABS Take 1 tablet by mouth daily 90 tablet 3     thin (NO BRAND SPECIFIED) lancets Use with lanceting device twice a day. To accompany: Blood Glucose Monitor Brands: per insurance. 200 each 11       Allergies, family, and social history were reviewed and documented as needed in EHR.     REVIEW OF SYSTEMS  A focused ROS was performed, with pertinent positives and negatives as noted in the HPI.    PHYSICAL EXAM  /80 (BP Location: Right arm, Patient Position: Sitting, Cuff Size: Adult Large)   Pulse 88   Wt 88 kg (193 lb 14.4 oz)   BMI 39.16 kg/m    Body mass index is 39.16 kg/m .  Constitutional: Vital signs reviewed, as recorded above. Patient is alert, oriented and appears in no acute distress.  Eyes: PER, EOMI, no stare, lid lag, or retraction; no conjunctival injection.  Neck: Neck supple, no palpable thyromegaly.  Lymphatic: No cervical or supraclavicular LAD.  Cardiovascular: RRR, normal S1/S2, no audible murmurs, rubs or gallops; no LE edema.  Respiratory: CTAB, without wheezes, crackles or rhonchi; normal chest wall motion and respiratory  effort.  GI: Positive bowel sounds, soft, NT/ND.  MSK: No clubbing or cyanosis; normal muscle bulk and tone.  Skin: Acanthosis nigra cans about the neck.  Otherwise normal skin color, temperature, turgor and texture.  Neurological: Alert and oriented times 3. No tremor.    DATA REVIEW  Each of the following laboratory and/or imaging studies were reviewed.    Office Visit on 10/08/2021   Component Date Value Ref Range Status     Cholesterol 10/08/2021 226* <=199 mg/dL Final     Triglycerides 10/08/2021 262* <=149 mg/dL Final     Direct Measure HDL 10/08/2021 50  >=50 mg/dL Final    HDL Cholesterol Reference Range:     0-2 years:   No reference ranges established for patients under 2 years old  at [facility] for lipid analytes.    2-8 years:  Greater than 45 mg/dL     18 years and older:   Female: Greater than or equal to 50 mg/dL   Male:   Greater than or equal to 40 mg/dL     LDL Cholesterol Calculated 10/08/2021 124  <=129 mg/dL Final     Patient Fasting > 8hrs? 10/08/2021 Yes   Final     hCG Urine Qualitative 10/08/2021 Positive* Negative Final    This test is for screening purposes.  Results should be interpreted along with the clinical picture.  Confirmation testing is available if warranted by ordering [id number], HCG Quantitative Pregnancy.     Hemoglobin A1C 10/08/2021 11.9* 0.0 - 5.6 % Final    Normal <5.7%   Prediabetes 5.7-6.4%    Diabetes 6.5% or higher     Note: Adopted from ADA consensus guidelines.     Sodium 10/08/2021 131* 136 - 145 mmol/L Final     Potassium 10/08/2021 4.4  3.5 - 5.0 mmol/L Final     Chloride 10/08/2021 100  98 - 107 mmol/L Final     Carbon Dioxide (CO2) 10/08/2021 20* 22 - 31 mmol/L Final     Anion Gap 10/08/2021 11  5 - 18 mmol/L Final     Urea Nitrogen 10/08/2021 7* 8 - 22 mg/dL Final     Creatinine 10/08/2021 0.71  0.60 - 1.10 mg/dL Final     Calcium 10/08/2021 10.1  8.5 - 10.5 mg/dL Final     Glucose 10/08/2021 322* 70 - 125 mg/dL Final     Alkaline Phosphatase  10/08/2021 62  45 - 120 U/L Final     AST 10/08/2021 20  0 - 40 U/L Final     ALT 10/08/2021 28  0 - 45 U/L Final     Protein Total 10/08/2021 8.2* 6.0 - 8.0 g/dL Final     Albumin 10/08/2021 3.9  3.5 - 5.0 g/dL Final     Bilirubin Total 10/08/2021 0.7  0.0 - 1.0 mg/dL Final     GFR Estimate 10/08/2021 >90  >60 mL/min/1.73m2 Final    As of July 11, 2021, eGFR is calculated by the CKD-EPI creatinine equation, without race adjustment. eGFR can be influenced by muscle mass, exercise, and diet. The reported eGFR is an estimation only and is only applicable if the renal function is stable.     Quantiferon Nil Tube 10/08/2021 0.22  IU/mL Final     Quantiferon TB1 Tube 10/08/2021 0.23  IU/mL Final     Quantiferon TB2 Tube 10/08/2021 0.77   Final     Quantiferon Mitogen 10/08/2021 1.33  IU/mL Final     Microalbumin Urine mg/dL 10/08/2021 9.88* 0.00 - 1.99 mg/dL Final     Creatinine Urine mg/dL 10/08/2021 25  mg/dL Final     Microalbumin Urine mg/g Cr 10/08/2021 395.2* <=19.9 mg/g Cr Final     Quantiferon-TB Gold Plus 10/08/2021 Positive* Negative Final    Interferon gamma response to M.tuberculosis antigens was detected,suggesting infection with M.tuberculosis. Positive results in patients at low risk for infection should be interpreted with caution and repeat testing on a new sample should be considered as recommended by the 2017 ATS/IDSA/CDC Clinical Prac  saige Guidelines for Diagnosis of Tuberculosis in Adults and Children      TB1 Ag minus Nil Value 10/08/2021 0.01  IU/mL Final     TB2 Ag minus Nil Value 10/08/2021 0.55  IU/mL Final     Mitogen minus Nil Result 10/08/2021 1.11  IU/mL Final     Nil Result 10/08/2021 0.22  IU/mL Final         ASSESSMENT  1.  Diabetes mellitus, type 2, pregestational.  Uncontrolled based on reported glucose data (although I do not have glucose records) and A1c in 10/2021.  Check A1c today.  Since patient is capturing hyperglycemia to the 200s (albeit at random checks after meals) we will  start low-dose of prandial insulin.  We will continue glargine for now, consider changing to NPH once we have more glucose data.  We discussed importance of monitoring glucose consistently: At a minimum 4 times daily, fasting and 1 hour after meals.  We reviewed glucose goals.  The patient is hesitant about frequency of glucose monitoring but agrees to check as much as feasible for her.  It is important to establish with diabetes care and  for this pregnancy: Again, the patient is hesitant to move forward with this but we discussed that we deliver care as a team and it will be important for diabetes care and  to follow patient and not only review diet and insulin management but titrate insulin doses as needed.  She may also be a candidate for CGM use.    2.  Diabetes preventative care.  -No known history of diabetic retinopathy; per patient, eye exam in 7/2021 did not show abnormal findings.  Recommend follow-up eye exam during pregnancy.  Does not want to be seen in the [facility] system so she will make appointment at her usual eye clinic on [facility] (she does not know name so we cannot help her with making appointment today).  -Known macroalbuminuria.    3.  Pregnancy.  Patient was under the impression she saw Dr. Colmenares and was told no follow-up was necessary--we contacted Dr. Colmenares's office and it appears patient was referred to [facility] since she is high risk but no showed appointment.  We will help her make [facility] appointment today.  Discussed the importance of follow-up for obstetric care.    PLAN   -Labs today  -Patient to make eye appointment  -Continue Lantus 18 units daily  -Start NovoLog 4 unit before meals  -Check blood glucose fasting and 1 hour after meals; we will give logbook for glucose logging  -We will make OB appointment  -Diabetes education referral  -Return for a follow-up visit in 1 week       Orders Placed This Encounter    Procedures     Hemoglobin A1c     AMB Adult Diabetes Educator Referral       I spent a total of 66 minutes on the date of encounter reviewing medical records, evaluating the patient, coordinating care and documenting in the EHR, as detailed above.      Judy Crowley MD   Division of Diabetes, Endocrinology and Metabolism  Department of Medicine      cc: Juan David Coppola; Juan David Coppola MD               Again, thank you for allowing me to participate in the care of your patient.        Sincerely,        JUDY Crowley MD

## 2021-12-01 NOTE — PROGRESS NOTES
ENDOCRINOLOGY NEW PATIENT VISIT        HISTORY OF PRESENT ILLNESS    Carlos Oliveros is seen in consultation at the request of Juan David Coppola for diabetes mellitus type 2 in pregnancy.    The patient has history of gestational diabetes and followed with Ms. Sherrie NP, in our clinic during her last pregnancy in 2018.  At that time, she was treated with NPH and NovoLog insulin.  She is  and has history of preeclampsia.    Patient is at 17w4d.OB ultrasound on 10/18/2021 showed IUG at 11 weeks and 2 days with EDC of 2022.    She notes that she has felt well thus far during pregnancy.  Eating 1-2 meals per day, plus snacking (often snacks on fruit or may have Ovaltine with milk).  Does not drink soda.  Does not drink juice.  Cannot give specifics on meals, but does note that today she had rice for breakfast (a typical meal for her) and last night she had a noodle dish.    She was checking glucose once daily but ran out of test strips 2 weeks ago and stopped monitoring altogether.  She was checking once daily, alternating between fasting and later in the day.  She recalls fasting glucose values were as low as 110s, but sometimes higher.  She checked her glucose randomly later in the day and recalls values in the 200s with some regularity.    Current diabetes regimen: Lantus 18 units nightly (started on 10/8/2021 by Dr. Coppola).    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    PAST MEDICAL HISTORY  Past Medical History:   Diagnosis Date     Diabetes mellitus (H)     IDGDM vs Type 2     Hypertension     gestational hypertension       MEDICATIONS  Current Outpatient Medications   Medication Sig Dispense Refill     alcohol swab prep pads Use to swab area of injection/dennise as directed. 100 each 3     blood glucose (NO BRAND SPECIFIED) test strip Use to test blood  sugar 2  times daily or as directed. To accompany: Blood Glucose Monitor Brands: per insurance. 200 strip 11     blood glucose monitoring (NO BRAND SPECIFIED) meter device kit Use to test blood sugar two times daily or as directed. Preferred blood glucose meter OR supplies to accompany: Blood Glucose Monitor Brands: per insurance. 1 kit 0     insulin glargine (LANTUS PEN) 100 UNIT/ML pen Inject 10 Units Subcutaneous At Bedtime 15 mL 11     insulin pen needle (32G X 4 MM) 32G X 4 MM miscellaneous Use one pen needles daily or as directed. 200 each 11     Prenatal Vit-Fe Fumarate-FA (PRENATAL MULTIVITAMIN  WITH IRON) 28-0.8 MG TABS Take 1 tablet by mouth daily 90 tablet 3     thin (NO BRAND SPECIFIED) lancets Use with lanceting device twice a day. To accompany: Blood Glucose Monitor Brands: per insurance. 200 each 11       Allergies, family, and social history were reviewed and documented as needed in EHR.     REVIEW OF SYSTEMS  A focused ROS was performed, with pertinent positives and negatives as noted in the HPI.    PHYSICAL EXAM  /80 (BP Location: Right arm, Patient Position: Sitting, Cuff Size: Adult Large)   Pulse 88   Wt 88 kg (193 lb 14.4 oz)   BMI 39.16 kg/m    Body mass index is 39.16 kg/m .  Constitutional: Vital signs reviewed, as recorded above. Patient is alert, oriented and appears in no acute distress.  Eyes: PER, EOMI, no stare, lid lag, or retraction; no conjunctival injection.  Neck: Neck supple, no palpable thyromegaly.  Lymphatic: No cervical or supraclavicular LAD.  Cardiovascular: RRR, normal S1/S2, no audible murmurs, rubs or gallops; no LE edema.  Respiratory: CTAB, without wheezes, crackles or rhonchi; normal chest wall motion and respiratory effort.  GI: Positive bowel sounds, soft, NT/ND.  MSK: No clubbing or cyanosis; normal muscle bulk and tone.  Skin: Acanthosis nigra cans about the neck.  Otherwise normal skin color, temperature, turgor and texture.  Neurological: Alert and  oriented times 3. No tremor.    DATA REVIEW  Each of the following laboratory and/or imaging studies were reviewed.    Office Visit on 10/08/2021   Component Date Value Ref Range Status     Cholesterol 10/08/2021 226* <=199 mg/dL Final     Triglycerides 10/08/2021 262* <=149 mg/dL Final     Direct Measure HDL 10/08/2021 50  >=50 mg/dL Final    HDL Cholesterol Reference Range:     0-2 years:   No reference ranges established for patients under 2 years old  at [facility] for lipid analytes.    2-8 years:  Greater than 45 mg/dL     18 years and older:   Female: Greater than or equal to 50 mg/dL   Male:   Greater than or equal to 40 mg/dL     LDL Cholesterol Calculated 10/08/2021 124  <=129 mg/dL Final     Patient Fasting > 8hrs? 10/08/2021 Yes   Final     hCG Urine Qualitative 10/08/2021 Positive* Negative Final    This test is for screening purposes.  Results should be interpreted along with the clinical picture.  Confirmation testing is available if warranted by ordering [id number], HCG Quantitative Pregnancy.     Hemoglobin A1C 10/08/2021 11.9* 0.0 - 5.6 % Final    Normal <5.7%   Prediabetes 5.7-6.4%    Diabetes 6.5% or higher     Note: Adopted from ADA consensus guidelines.     Sodium 10/08/2021 131* 136 - 145 mmol/L Final     Potassium 10/08/2021 4.4  3.5 - 5.0 mmol/L Final     Chloride 10/08/2021 100  98 - 107 mmol/L Final     Carbon Dioxide (CO2) 10/08/2021 20* 22 - 31 mmol/L Final     Anion Gap 10/08/2021 11  5 - 18 mmol/L Final     Urea Nitrogen 10/08/2021 7* 8 - 22 mg/dL Final     Creatinine 10/08/2021 0.71  0.60 - 1.10 mg/dL Final     Calcium 10/08/2021 10.1  8.5 - 10.5 mg/dL Final     Glucose 10/08/2021 322* 70 - 125 mg/dL Final     Alkaline Phosphatase 10/08/2021 62  45 - 120 U/L Final     AST 10/08/2021 20  0 - 40 U/L Final     ALT 10/08/2021 28  0 - 45 U/L Final     Protein Total 10/08/2021 8.2* 6.0 - 8.0 g/dL Final     Albumin 10/08/2021 3.9  3.5 - 5.0 g/dL Final     Bilirubin Total 10/08/2021  0.7  0.0 - 1.0 mg/dL Final     GFR Estimate 10/08/2021 >90  >60 mL/min/1.73m2 Final    As of July 11, 2021, eGFR is calculated by the CKD-EPI creatinine equation, without race adjustment. eGFR can be influenced by muscle mass, exercise, and diet. The reported eGFR is an estimation only and is only applicable if the renal function is stable.     Quantiferon Nil Tube 10/08/2021 0.22  IU/mL Final     Quantiferon TB1 Tube 10/08/2021 0.23  IU/mL Final     Quantiferon TB2 Tube 10/08/2021 0.77   Final     Quantiferon Mitogen 10/08/2021 1.33  IU/mL Final     Microalbumin Urine mg/dL 10/08/2021 9.88* 0.00 - 1.99 mg/dL Final     Creatinine Urine mg/dL 10/08/2021 25  mg/dL Final     Microalbumin Urine mg/g Cr 10/08/2021 395.2* <=19.9 mg/g Cr Final     Quantiferon-TB Gold Plus 10/08/2021 Positive* Negative Final    Interferon gamma response to M.tuberculosis antigens was detected,suggesting infection with M.tuberculosis. Positive results in patients at low risk for infection should be interpreted with caution and repeat testing on a new sample should be considered as recommended by the 2017 ATS/IDSA/CDC Clinical Prac  saige Guidelines for Diagnosis of Tuberculosis in Adults and Children      TB1 Ag minus Nil Value 10/08/2021 0.01  IU/mL Final     TB2 Ag minus Nil Value 10/08/2021 0.55  IU/mL Final     Mitogen minus Nil Result 10/08/2021 1.11  IU/mL Final     Nil Result 10/08/2021 0.22  IU/mL Final         ASSESSMENT  1.  Diabetes mellitus, type 2, pregestational.  Uncontrolled based on reported glucose data (although I do not have glucose records) and A1c in 10/2021.  Check A1c today.  Since patient is capturing hyperglycemia to the 200s (albeit at random checks after meals) we will start low-dose of prandial insulin.  We will continue glargine for now, consider changing to NPH once we have more glucose data.  We discussed importance of monitoring glucose consistently: At a minimum 4 times daily, fasting and 1 hour after meals.   We reviewed glucose goals.  The patient is hesitant about frequency of glucose monitoring but agrees to check as much as feasible for her.  It is important to establish with diabetes care and  for this pregnancy: Again, the patient is hesitant to move forward with this but we discussed that we deliver care as a team and it will be important for diabetes care and  to follow patient and not only review diet and insulin management but titrate insulin doses as needed.  She may also be a candidate for CGM use.    2.  Diabetes preventative care.  -No known history of diabetic retinopathy; per patient, eye exam in 7/2021 did not show abnormal findings.  Recommend follow-up eye exam during pregnancy.  Does not want to be seen in the [facility] system so she will make appointment at her usual eye clinic on [facility] (she does not know name so we cannot help her with making appointment today).  -Known macroalbuminuria.    3.  Pregnancy.  Patient was under the impression she saw Dr. Colmenares and was told no follow-up was necessary--we contacted Dr. Colmenares's office and it appears patient was referred to [facility] since she is high risk but no showed appointment.  We will help her make [facility] appointment today.  Discussed the importance of follow-up for obstetric care.    PLAN   -Labs today  -Patient to make eye appointment  -Continue Lantus 18 units daily  -Start NovoLog 4 unit before meals  -Check blood glucose fasting and 1 hour after meals; we will give logbook for glucose logging  -We will make OB appointment  -Diabetes education referral  -Return for a follow-up visit in 1 week       Orders Placed This Encounter   Procedures     Hemoglobin A1c     AMB Adult Diabetes Educator Referral       I spent a total of 66 minutes on the date of encounter reviewing medical records, evaluating the patient, coordinating care and documenting in the EHR, as detailed  above.      Alicia Crowley MD   Division of Diabetes, Endocrinology and Metabolism  Department of Medicine      cc: Juan David Coppola; Juan David Coppola MD

## 2021-12-02 ENCOUNTER — TRANSFERRED RECORDS (OUTPATIENT)
Dept: HEALTH INFORMATION MANAGEMENT | Facility: CLINIC | Age: 31
End: 2021-12-02
Payer: COMMERCIAL

## 2021-12-03 ENCOUNTER — VIRTUAL VISIT (OUTPATIENT)
Dept: EDUCATION SERVICES | Facility: CLINIC | Age: 31
End: 2021-12-03
Attending: INTERNAL MEDICINE
Payer: COMMERCIAL

## 2021-12-03 DIAGNOSIS — E11.9 TYPE 2 DIABETES MELLITUS TREATED WITHOUT INSULIN (H): ICD-10-CM

## 2021-12-03 DIAGNOSIS — O24.112 PREGNANCY WITH TYPE 2 DIABETES MELLITUS IN SECOND TRIMESTER: Primary | ICD-10-CM

## 2021-12-03 PROCEDURE — 97802 MEDICAL NUTRITION INDIV IN: CPT | Mod: 95 | Performed by: DIETITIAN, REGISTERED

## 2021-12-03 NOTE — LETTER
12/3/2021         RE: Carlos Oliveros  [address]  [place] [address]        Dear Colleague,    Thank you for referring your patient, Carlos Oliveros, to the [facility]. Please see a copy of my visit note below.    Type of Service: Telephone Visit    Originating Location (Patient Location): Home  Distant Location (Provider Location): [facility]  Mode of Communication:  Telephone    Telephone Visit Start Time: 8:30 a  Telephone Visit End Time (telephone visit stop time): 9:15 a    Diabetes Self-Management Education & Support    Presents for: Initial Assessment for new diagnosis    SUBJECTIVE/OBJECTIVE:  Presents for: Initial Assessment for new diagnosis  Accompanied by: Self  Diabetes education in the past 24mo: No  Focus of Visit: Monitoring,Taking Medication,Healthy Eating  Diabetes type: Type 2  Date of diagnosis: pregnancy  Other concerns:: Language barrier  Cultural Influences/Ethnic Background:  Not  or       Diabetes Symptoms & Complications  Neuropathy: No  Visual change: No  Slow healing wounds: No       Patient Problem List and Family Medical History reviewed for relevant medical history, current medical status, and diabetes risk factors.    Vitals:    Estimated Date of Delivery: 5/7/2022    Labs:  Lab Results   Component Value Date    A1C 9.1 12/01/2021     Lab Results   Component Value Date     10/08/2021     Lab Results   Component Value Date     10/08/2021     06/28/2018     Direct Measure HDL   Date Value Ref Range Status   10/08/2021 50 >=50 mg/dL Final     Comment:     HDL Cholesterol Reference Range:     0-2 years:   No reference ranges established for patients under 2 years old  at [facility] for lipid analytes.    2-8 years:  Greater than 45 mg/dL     18 years and older:   Female: Greater than or equal to 50 mg/dL   Male:   Greater than or equal to 40 mg/dL   ]  GFR Estimate   Date Value Ref  Range Status   10/08/2021 >90 >60 mL/min/1.73m2 Final     Comment:     As of July 11, 2021, eGFR is calculated by the CKD-EPI creatinine equation, without race adjustment. eGFR can be influenced by muscle mass, exercise, and diet. The reported eGFR is an estimation only and is only applicable if the renal function is stable.   01/16/2020 >60 >60 mL/min/1.73m2 Final     GFR Estimate If Black   Date Value Ref Range Status   01/16/2020 >60 >60 mL/min/1.73m2 Final     Lab Results   Component Value Date    CR 0.71 10/08/2021     No results found for: MICROALBUMIN    Last 3 BP:   BP Readings from Last 3 Encounters:   12/01/21 120/80   10/08/21 128/86   01/16/20 130/84       History   Smoking Status     Never Smoker   Smokeless Tobacco     Never Used       Healthy Eating  Healthy Eating Assessed Today: Yes  Meal planning/habits: None  Meals include: Other (patient may have 1-2 meals/day)  Breakfast: 8-9am: 2 fistfuls rice, soup with vegetables, sometimes she may have fish  Dinner: patient was a poor historian for her intake:  she said she may have rice, up to three fistfuls  Other: she reported that she did not like vegetables very much  Beverages: Water (patient reported that sometimes she may have juice or soda, when she craves it)      Being Active  Being Active Assessed Today: No    Monitoring  Monitoring Assessed Today: Yes (patient checked her FBS during our conversation: FBS was 158mg/d, she reported that she was checking her BG x 2 at home, she was not able to give me any of her BG readings)  Did patient bring glucose meter to appointment? :  (patient tested her FBS at home: 158 mg/dl)  Blood Glucose Meter: Unknown  Times checking blood sugar at home (number): 2  Times checking blood sugar at home (per): Day    FBS 12/3/21: 158 mg/dl     Checking for urine ketones? no    Taking Medications  Diabetes Medication(s)     Insulin       insulin glargine (LANTUS PEN) 100 UNIT/ML pen    Inject 20 Units Subcutaneous At  Bedtime     insulin aspart (NOVOLOG PEN) 100 UNIT/ML pen    4 units injected before each meal.            Taking Medication Assessed Today: Yes  Current Treatments: Insulin Injections  Dose schedule: At bedtime  Given by: Patient  Injection/Infusion sites: Abdomen,Thighs  Problems taking diabetes medications regularly?: Yes (patient has not started taking her Novolog insulin, she said she would start today)    Problem Solving                 Reducing Risks  Diabetes Risks: Ethnicity  Additional female risks: Gestational Diabetes    Healthy Coping  Emotional response to diabetes: Uncertain    Patient Activation Measure Survey Score:  No flowsheet data found.      INTERVENTION:  Patient presented for Type 2 DM/pregnancy on basal and bolus insulin.    Instruction was given on SMBG: 3 x/day: fasting and one hour after each of her meals.    Instruction was given on insulin doses/timing, site rotation and storage.     Instruction was given on portion control of her rice portions and having 2-3 smaller meals/day, versus one big meal. We also discussed hydration, not having any sugary beverages.     Healthy Eating: portion control and frequency of meals  Monitoring: individual blood glucose targets and frequency of monitoring  Taking Medication: proper site selection and rotation for injections and when to take medications    Referral to care team: pregnancy clinic    ASSESSMENT:  Ketones: not checking, not covered on today's visit.     Patient reported that she was checking her glucose twice daily at home, but she was not able to share any of the BG readings with me today.  She did test her FBS during the phone call, which was 158 mg/dl. Patient reported that she was taking insulin before bedtime: 18 units.  She just picked up her meal time insulin and would be starting that today. She reported eating 1-2 meals/day: mainly rice, soup with some vegetables, and fish.  She is hydrating with water, occasionally she will have  juice or a soda.     NO blood glucose record was available per patient.         Education provided today on:  AADE Self-Care Behaviors:  Healthy Eating: portion control  Monitoring: individual blood glucose targets, frequency of monitoring and proper sharps disposal  Taking Medication: proper site selection and rotation for injections and when to take medications    Opportunities for ongoing education and support in diabetes-self management were discussed.    Pt verbalized understanding of concepts discussed and recommendations provided today.       Education Materials Provided:  No new materials provided today      PLAN:  1. Per CDE protocol increase Lantus to 20 units at bedtime.  2. Take Novolog 4 units 10 minutes prio to meals.  3. Have a minimum of two meals/day: with one fistful of rice, soup with vegetables and fish.  4. Avoid all sugary beverages.     See Patient Instructions for co-developed, patient-stated behavior change goals.  AVS printed and provided to patient today. See Follow-Up section for recommended follow-up.      Time Spent: 45 minutes  Encounter Type: Individual    Any diabetes medication dose changes were made via the CDE Protocol and Collaborative Practice Agreement with the patient's endocrinology provider. A copy of this encounter was shared with the provider.      FOLLOW-UP:  Follow up with Endocrinology on 12/8/21    Follow up with pregnancy clinic the week of 12/6/21

## 2021-12-03 NOTE — Clinical Note
Dr. Crowley,  I had a visit with  today for Diabetes Ed.   reported that she had picked up her Novolog and she would start using today.  She has been taking Lantus 18 units at bedtime.  Her FBS today was 158 mg/dl, she had no other BG to share with me. I had her increase her Lantus to 20 units. She is very hesitant about increasing her insulin as she reported that she felt shaky if her BG was below 120.   We discussed diet, SMBG and insulin doses/timing.        Mckenna Patel RD, [facility]

## 2021-12-03 NOTE — PROGRESS NOTES
Type of Service: Telephone Visit    Originating Location (Patient Location): Home  Distant Location (Provider Location): [facility]  Mode of Communication:  Telephone    Telephone Visit Start Time: 8:30 a  Telephone Visit End Time (telephone visit stop time): 9:15 a    Diabetes Self-Management Education & Support    Presents for: Initial Assessment for new diagnosis    SUBJECTIVE/OBJECTIVE:  Presents for: Initial Assessment for new diagnosis  Accompanied by: Self  Diabetes education in the past 24mo: No  Focus of Visit: Monitoring,Taking Medication,Healthy Eating  Diabetes type: Type 2  Date of diagnosis: pregnancy  Other concerns:: Language barrier  Cultural Influences/Ethnic Background:  Not  or       Diabetes Symptoms & Complications  Neuropathy: No  Visual change: No  Slow healing wounds: No       Patient Problem List and Family Medical History reviewed for relevant medical history, current medical status, and diabetes risk factors.    Vitals:    Estimated Date of Delivery: 5/7/2022    Labs:  Lab Results   Component Value Date    A1C 9.1 12/01/2021     Lab Results   Component Value Date     10/08/2021     Lab Results   Component Value Date     10/08/2021     06/28/2018     Direct Measure HDL   Date Value Ref Range Status   10/08/2021 50 >=50 mg/dL Final     Comment:     HDL Cholesterol Reference Range:     0-2 years:   No reference ranges established for patients under 2 years old  at [facility] for lipid analytes.    2-8 years:  Greater than 45 mg/dL     18 years and older:   Female: Greater than or equal to 50 mg/dL   Male:   Greater than or equal to 40 mg/dL   ]  GFR Estimate   Date Value Ref Range Status   10/08/2021 >90 >60 mL/min/1.73m2 Final     Comment:     As of July 11, 2021, eGFR is calculated by the CKD-EPI creatinine equation, without race adjustment. eGFR can be influenced by muscle mass, exercise, and diet. The reported eGFR  is an estimation only and is only applicable if the renal function is stable.   01/16/2020 >60 >60 mL/min/1.73m2 Final     GFR Estimate If Black   Date Value Ref Range Status   01/16/2020 >60 >60 mL/min/1.73m2 Final     Lab Results   Component Value Date    CR 0.71 10/08/2021     No results found for: MICROALBUMIN    Last 3 BP:   BP Readings from Last 3 Encounters:   12/01/21 120/80   10/08/21 128/86   01/16/20 130/84       History   Smoking Status     Never Smoker   Smokeless Tobacco     Never Used       Healthy Eating  Healthy Eating Assessed Today: Yes  Meal planning/habits: None  Meals include: Other (patient may have 1-2 meals/day)  Breakfast: 8-9am: 2 fistfuls rice, soup with vegetables, sometimes she may have fish  Dinner: patient was a poor historian for her intake:  she said she may have rice, up to three fistfuls  Other: she reported that she did not like vegetables very much  Beverages: Water (patient reported that sometimes she may have juice or soda, when she craves it)      Being Active  Being Active Assessed Today: No    Monitoring  Monitoring Assessed Today: Yes (patient checked her FBS during our conversation: FBS was 158mg/d, she reported that she was checking her BG x 2 at home, she was not able to give me any of her BG readings)  Did patient bring glucose meter to appointment? :  (patient tested her FBS at home: 158 mg/dl)  Blood Glucose Meter: Unknown  Times checking blood sugar at home (number): 2  Times checking blood sugar at home (per): Day    FBS 12/3/21: 158 mg/dl     Checking for urine ketones? no    Taking Medications  Diabetes Medication(s)     Insulin       insulin glargine (LANTUS PEN) 100 UNIT/ML pen    Inject 20 Units Subcutaneous At Bedtime     insulin aspart (NOVOLOG PEN) 100 UNIT/ML pen    4 units injected before each meal.            Taking Medication Assessed Today: Yes  Current Treatments: Insulin Injections  Dose schedule: At bedtime  Given by: Patient  Injection/Infusion  sites: Abdomen,Thighs  Problems taking diabetes medications regularly?: Yes (patient has not started taking her Novolog insulin, she said she would start today)    Problem Solving                 Reducing Risks  Diabetes Risks: Ethnicity  Additional female risks: Gestational Diabetes    Healthy Coping  Emotional response to diabetes: Uncertain    Patient Activation Measure Survey Score:  No flowsheet data found.      INTERVENTION:  Patient presented for Type 2 DM/pregnancy on basal and bolus insulin.    Instruction was given on SMBG: 3 x/day: fasting and one hour after each of her meals.    Instruction was given on insulin doses/timing, site rotation and storage.     Instruction was given on portion control of her rice portions and having 2-3 smaller meals/day, versus one big meal. We also discussed hydration, not having any sugary beverages.     Healthy Eating: portion control and frequency of meals  Monitoring: individual blood glucose targets and frequency of monitoring  Taking Medication: proper site selection and rotation for injections and when to take medications    Referral to care team: pregnancy clinic    ASSESSMENT:  Ketones: not checking, not covered on today's visit.     Patient reported that she was checking her glucose twice daily at home, but she was not able to share any of the BG readings with me today.  She did test her FBS during the phone call, which was 158 mg/dl. Patient reported that she was taking insulin before bedtime: 18 units.  She just picked up her meal time insulin and would be starting that today. She reported eating 1-2 meals/day: mainly rice, soup with some vegetables, and fish.  She is hydrating with water, occasionally she will have juice or a soda.     NO blood glucose record was available per patient.         Education provided today on:  AADE Self-Care Behaviors:  Healthy Eating: portion control  Monitoring: individual blood glucose targets, frequency of monitoring and proper  sharps disposal  Taking Medication: proper site selection and rotation for injections and when to take medications    Opportunities for ongoing education and support in diabetes-self management were discussed.    Pt verbalized understanding of concepts discussed and recommendations provided today.       Education Materials Provided:  No new materials provided today      PLAN:  1. Per CDE protocol increase Lantus to 20 units at bedtime.  2. Take Novolog 4 units 10 minutes prio to meals.  3. Have a minimum of two meals/day: with one fistful of rice, soup with vegetables and fish.  4. Avoid all sugary beverages.     See Patient Instructions for co-developed, patient-stated behavior change goals.  AVS printed and provided to patient today. See Follow-Up section for recommended follow-up.      Time Spent: 45 minutes  Encounter Type: Individual    Any diabetes medication dose changes were made via the CDE Protocol and Collaborative Practice Agreement with the patient's endocrinology provider. A copy of this encounter was shared with the provider.      FOLLOW-UP:  Follow up with Endocrinology on 12/8/21    Follow up with pregnancy clinic the week of 12/6/21

## 2021-12-07 ENCOUNTER — TELEPHONE (OUTPATIENT)
Dept: ENDOCRINOLOGY | Facility: CLINIC | Age: 31
End: 2021-12-07
Payer: COMMERCIAL

## 2021-12-07 NOTE — TELEPHONE ENCOUNTER
----- Message from Mckenna Patel RD sent at 12/3/2021 10:15 AM CST -----  Regarding: Type 2 DM/ pregnant/ on basal and bolus insulin  Please reach out to schedule Eh into the pregnancy clinic for the week of 12/6/21.      Patient is  seeing Endocrinologist, Fermín Crowley MD at the [facility] on 12/8/21.  She did have a visit with her already on 12/1/21.      Thank you,    Mckenna

## 2021-12-07 NOTE — CONFIDENTIAL NOTE
Please contact patient to get her scheduled in pregnancy clinic and weekly with me until then.  Thanks,  Isabelle

## 2021-12-15 ENCOUNTER — VIRTUAL VISIT (OUTPATIENT)
Dept: EDUCATION SERVICES | Facility: CLINIC | Age: 31
End: 2021-12-15
Payer: COMMERCIAL

## 2021-12-15 DIAGNOSIS — E11.9 TYPE 2 DIABETES MELLITUS TREATED WITHOUT INSULIN (H): ICD-10-CM

## 2021-12-15 PROCEDURE — 99207 PR NO BILLABLE SERVICE THIS VISIT: CPT

## 2021-12-15 NOTE — PROGRESS NOTES
Spoke with  via telephone via professional .  After getting blood sugars, call was disconnected.  Tried 3 times to connect with patient again without success.  Blood Sugar Readings:  Checking fasting and after breakfast  12.10  F-155  B-x  L-x  D-141    12.11-stopped checking 4 times daily due to pain in fingers  F-155  B-133  L-x  D-x    12.12  F-145  B-122  L-  D-    12.13  F-188  B-  L-  D-125    12.14  F-168  B-157  L-  D-    12.15  F-152  B-136  L-  D-    Bedtime insulin-20 units  4 units before meals each time  OB-Giegler  EDC-5.07.22  Was cut off    I agree with the aforementioned diabetes plan.  Juana Balderas NP  [facility] Endocrinology  12/23/2021  6:06 AM

## 2021-12-15 NOTE — LETTER
12/15/2021         RE: Carlos Oliveros  [address]  [place] [address]        Dear Colleague,    Thank you for referring your patient, Carlos Oliveros, to the [facility]. Please see a copy of my visit note below.    Spoke with Carlos via telephone via professional .  After getting blood sugars, call was disconnected.  Tried 3 times to connect with patient again without success.  Blood Sugar Readings:  Checking fasting and after breakfast  12.10  F-155  B-x  L-x  D-141    12.11-stopped checking 4 times daily due to pain in fingers  F-155  B-133  L-x  D-x    12.12  F-145  B-122  L-  D-    12.13  F-188  B-  L-  D-125    12.14  F-168  B-157  L-  D-    12.15  F-152  B-136  L-  D-    Bedtime insulin-20 units  4 units before meals each time  OB-Darrian  EDC-5.07.22  Was cut off

## 2021-12-20 ENCOUNTER — TRANSFERRED RECORDS (OUTPATIENT)
Dept: HEALTH INFORMATION MANAGEMENT | Facility: CLINIC | Age: 31
End: 2021-12-20
Payer: COMMERCIAL

## 2021-12-22 ENCOUNTER — VIRTUAL VISIT (OUTPATIENT)
Dept: EDUCATION SERVICES | Facility: CLINIC | Age: 31
End: 2021-12-22
Payer: COMMERCIAL

## 2021-12-22 DIAGNOSIS — O24.112 PRE-EXISTING TYPE 2 DIABETES MELLITUS DURING PREGNANCY IN SECOND TRIMESTER: Primary | ICD-10-CM

## 2021-12-22 DIAGNOSIS — E11.9 TYPE 2 DIABETES MELLITUS TREATED WITHOUT INSULIN (H): ICD-10-CM

## 2021-12-22 PROCEDURE — 99207 PR NO CHARGE LOS: CPT | Mod: 25

## 2021-12-22 NOTE — LETTER
12/22/2021         RE: Carlos Oliveros  [address]  [place] [address]        Dear Colleague,    Thank you for referring your patient, Carlos Oliveros, to the [facility]. Please see a copy of my visit note below.    Carlos was not at home when contacted via telephone via professional .  She did not have her meter with her.  Stated this morning her fasting reading was 156.  She is currently taking 20 units of Lantus at bedtime and 4 units of Novolog before meals.  EDC-5.07.22-20 weeks 4 days  #6  Giegler  A1c on 12.01.21-9.1%    Plan:  Increase Lantus to 30 units.  Reminded of appointment next week and to make sure she has her meter with her.  Isabelle Urban RN, [facility]  [phone]  In Clinic Tuesday, Wednesday, Thursday

## 2021-12-22 NOTE — PROGRESS NOTES
Eh was not at home when contacted via telephone via professional .  She did not have her meter with her.  Stated this morning her fasting reading was 156.  She is currently taking 20 units of Lantus at bedtime and 4 units of Novolog before meals.  EDC-5.07.22-20 weeks 4 days  #6  Giegler  A1c on 12.01.21-9.1%    Plan:  Increase Lantus to 30 units.  Reminded of appointment next week and to make sure she has her meter with her.  Isabelle Urban RN, [facility]  [phone]  In Clinic Tuesday, Wednesday, Thursday    I agree with the aforementioned diabetes plan.  Juana Balderas NP  [facility] Endocrinology  12/23/2021  6:06 AM

## 2021-12-29 ENCOUNTER — VIRTUAL VISIT (OUTPATIENT)
Dept: EDUCATION SERVICES | Facility: CLINIC | Age: 31
End: 2021-12-29
Payer: COMMERCIAL

## 2021-12-29 DIAGNOSIS — O24.112 PRE-EXISTING TYPE 2 DIABETES MELLITUS DURING PREGNANCY IN SECOND TRIMESTER: Primary | ICD-10-CM

## 2021-12-29 DIAGNOSIS — E11.9 TYPE 2 DIABETES MELLITUS TREATED WITHOUT INSULIN (H): ICD-10-CM

## 2021-12-29 PROCEDURE — 99207 PR NO CHARGE NURSE ONLY: CPT

## 2021-12-29 RX ORDER — LANCETS
EACH MISCELLANEOUS
Qty: 200 EACH | Refills: 11 | Status: SHIPPED | OUTPATIENT
Start: 2021-12-29

## 2021-12-29 NOTE — LETTER
12/29/2021         RE: Carlos Oliveros  [address]  [place] [address]        Dear Colleague,    Thank you for referring your patient, Carlos Oliveros, to the [facility]. Please see a copy of my visit note below.    Spoke with Carlos today via telephone with a professional  for her follow up on DMT2 during pregnancy.  She has not been checking her blood sugars as she has been out of lancets.  She has continued taking her insulin, 30 units at bedtime and 4 units before each meal.  Rx for lancets sent into preferred pharmacy.  Discussed appointment next week and she agreed.    EDC-5.07.22-21 weeks 4 days  #6  Darrian  A1c done 12.01.21-9.1%    Current doses:  Lantus 30 units  Novolog 4 units    Follow up next week.  Isabelle rUban RN, [facility]  [phone]  In Clinic Tuesday, Wednesday, Thursday

## 2021-12-29 NOTE — PROGRESS NOTES
Spoke with  today via telephone with a professional  for her follow up on DMT2 during pregnancy.  She has not been checking her blood sugars as she has been out of lancets.  She has continued taking her insulin, 30 units at bedtime and 4 units before each meal.  Rx for lancets sent into preferred pharmacy.  Discussed appointment next week and she agreed.    EDC-5.07.22-21 weeks 4 days  #6  Giegler  A1c done 12.01.21-9.1%    Current doses:  Lantus 30 units  Novolog 4 units    Follow up next week.  Isabelle Urban RN, [facility]  [phone]  In Clinic Tuesday, Wednesday, Thursday    I agree with the aforementioned diabetes plan.  Juana Balderas NP  [facility] Endocrinology  1/3/2022  9:20 AM

## 2022-01-05 ENCOUNTER — VIRTUAL VISIT (OUTPATIENT)
Dept: EDUCATION SERVICES | Facility: CLINIC | Age: 32
End: 2022-01-05
Payer: COMMERCIAL

## 2022-01-05 DIAGNOSIS — O24.112 PRE-EXISTING TYPE 2 DIABETES MELLITUS DURING PREGNANCY IN SECOND TRIMESTER: Primary | ICD-10-CM

## 2022-01-05 PROCEDURE — 99207 PR NO CHARGE NURSE ONLY: CPT

## 2022-01-05 NOTE — PROGRESS NOTES
"Carlos is a 31 year old who is being evaluated via a billable telephone visit.      What phone number would you like to be contacted at? [phone]  How would you like to obtain your AVS? Mail a copy     [facility]  ENDOCRINOLOGY     Diabetes in Pregnancy 2022    Carlos Oliveros, 1990, [id number]          Reason for visit      1. Pregnancy with type 2 diabetes mellitus in second trimester        HPI     Carlos Oliveros is a very pleasant 31 year old old female who presents for management of Diabetes Mellitus Type 2 in pregnancy.  She is currently 31w2d  pregnant . Due date is 22    Current carbohydrate intake:consistent with recommendations of 30g-60g-60g.  I have reviewed her blood glucose logs and note that the:  Fasting readings  are:above range on current regimen  Postprandial readings are:above range on current regimen  Current Lantus dose: 24  Current Prandial insulin: 4  Blood glucose logs/meter brought in and data reviewed and incorporated into decision-making.  Planned delivery at:   OBFRANCISCON: Darrian    Therapy/Interventions in the past:  She has been seen by the Diabetes Educator- and has received instruction on carbohydrate counting and  consistency.  Records from referring provider and other sources have also been reviewed and incorporated into decision-making.      TODAY:    Carlos is contacted today after starting insulin for management of DM 2 in pregnancy. Her last A1c was 9.1. We are joined by MIMI Dahl and assisted by a Professional . Carlos initially reports today that she gets up every night and will \"cook and eat\" because she is \"restless\". She will then go back to sleep for a couple of hours and then gets up about 0400 to \"cook and fix food for her children\".  After she gets them off to school, she will go back to sleep for about 4 hours. She is not resting for longer than a 4 hour stretch. When we suggested that she take insulin when she gets up and fixes food for herself, " she changed her story and stated that she was only having a small snack.  Her meals look OK, with a couple of elevations. She reports that she is consistently taking her insulin prior to eating. She is not testing after Lunch, unfortunately, in spite of being asked to do so. She is feeling the baby move and is not having any swelling in her hands or feet.     Blood Glucose Numbers:      Fasting  165  1hour after:  B X  L X  D  171      Fasting  116  1 hour after:  B  X  L  X  D  127      Fasting  145  1 hour after:  B  X  L  X  D  107    1/10  Fasting  165  1 hour after:  B  X  L  X  D  123      Fasting  162  1 hour after:  B  X  L  X  D  135      Fasting  154  1 hour after:  B  X  L  X  D  167      Fasting 149             Past Medical History       Patient Active Problem List   Diagnosis     Type 2 diabetes mellitus treated without insulin (H)     Hyperlipidemia     Pregnancy with type 2 diabetes mellitus in second trimester     Severe pre-eclampsia     Gestational hypertension     Breech presentation     Morbid obesity (H)     History of hypertension        Past Surgical History     Past Surgical History:   Procedure Laterality Date      SECTION N/A 2018    Procedure:  SECTION;  Surgeon: Patricia Velazquez MD;  Location: [facility];  Service:        Family History     History reviewed. No pertinent family history.    Social History     Social History     Tobacco Use     Smoking status: Never Smoker     Smokeless tobacco: Never Used   Substance Use Topics     Alcohol use: No     Drug use: No       Review of Systems     Patient has no polyuria or polydipsia, no chest pain, dyspnea or TIA's, no numbness, tingling or pain in extremities  Remainder negative except as noted in HPI.      Vital Signs     LMP 2021 (Exact Date)   Wt Readings from Last 3 Encounters:   21 88 kg (193 lb 14.4 oz)   10/08/21 84.2 kg (185 lb 9 oz)   20 87.5 kg (193 lb)        Physical Exam         Assessment     1. Pregnancy with type 2 diabetes mellitus in second trimester        Plan     Going to discuss the sleeping thing with Dr Colmenares. No changes in her doses today. F/u next week.         Juana Balderas NP  1/14/2022          Lab Results     Microalbumin Urine mg/dL   Date Value Ref Range Status   10/08/2021 9.88 (H) 0.00 - 1.99 mg/dL Final       Cholesterol   Date Value Ref Range Status   10/08/2021 226 (H) <=199 mg/dL Final     Direct Measure HDL   Date Value Ref Range Status   10/08/2021 50 >=50 mg/dL Final     Comment:     HDL Cholesterol Reference Range:     0-2 years:   No reference ranges established for patients under 2 years old  at [facility] for lipid analytes.    2-8 years:  Greater than 45 mg/dL     18 years and older:   Female: Greater than or equal to 50 mg/dL   Male:   Greater than or equal to 40 mg/dL     Triglycerides   Date Value Ref Range Status   10/08/2021 262 (H) <=149 mg/dL Final             Current Medications         Phone call duration: 20 minutes    Date of last OV: Consult  Reason for visit: GDM

## 2022-01-05 NOTE — PROGRESS NOTES
Spoke with  via telephone today with the help of a professional  for her follow up on DMT2 during pregnancy.  Stated she is feeling well.  She was able to  her needles and is checking her blood sugars again.  She is checking her blood sugars twice daily as it hurts her fingers to test more.    EDC-5.07.22-22 weeks 4 days  #6  Darrian  No  A1c done 12.01.21-9.1%    Current doses:  Lantus 30 units  Novolog 4 units before meals    Blood Sugar Readings:  12.31  F-165  B-x  L-x  D-184    01.01  F-163  B-  L-  D-153    01.02  F-163  B-  L-  D-122    01.03  F-161  B-  L-  D-153    01.04  F-153  B-  L-  D-135  During night feels shaky/nauseated and eats something-rice 2 cups    01.05  F-160  B-x  L-x  D-x    She is eating 2 meals daily and is taking Novolog before each meal.  Her evening meal is about 5pm and bedtime is about 8-9pm.  Stated most nights she has trouble sleeping and feels shaky and nauseated.  She is eating about 2 cups of rice during the night before she can fall asleep.  Discussed how this is causing her fasting readings to be high.    Plan:  Decrease Lantus to 24 units at night.  Continue Novolog at current dose.  If hungry during the night, try to eat eggs, chicken or leftover fish.  Maybe a small handful of rice.  Follow up with endocrinology next week.  Isabelle Urban RN, [facility]  [phone]  In Clinic Tuesday, Wednesday, Thursday      I agree with the aforementioned diabetes plan.  Juana Balderas NP  [facility]  1/6/2022  5:57 AM

## 2022-01-05 NOTE — LETTER
1/5/2022         RE: Carlos Oliveros  [address]  [place] [address]        Dear Colleague,    Thank you for referring your patient, Carlos Oliveros, to the [facility]. Please see a copy of my visit note below.    Spoke with Carlos via telephone today with the help of a professional  for her follow up on DMT2 during pregnancy.  Stated she is feeling well.  She was able to  her needles and is checking her blood sugars again.  She is checking her blood sugars twice daily as it hurts her fingers to test more.    EDC-5.07.22-22 weeks 4 days  #6  [place]  No  A1c done 12.01.21-9.1%    Current doses:  Lantus 30 units  Novolog 4 units before meals    Blood Sugar Readings:  12.31  F-165  B-x  L-x  D-184    01.01  F-163  B-  L-  D-153    01.02  F-163  B-  L-  D-122    01.03  F-161  B-  L-  D-153    01.04  F-153  B-  L-  D-135  During night feels shaky/nauseated and eats something-rice 2 cups    01.05  F-160  B-x  L-x  D-x    She is eating 2 meals daily and is taking Novolog before each meal.  Her evening meal is about 5pm and bedtime is about 8-9pm.  Stated most nights she has trouble sleeping and feels shaky and nauseated.  She is eating about 2 cups of rice during the night before she can fall asleep.  Discussed how this is causing her fasting readings to be high.    Plan:  Decrease Lantus to 24 units at night.  Continue Novolog at current dose.  If hungry during the night, try to eat eggs, chicken or leftover fish.  Maybe a small handful of rice.  Follow up with endocrinology next week.  Isabelle Urban RN, [facility]  [phone]  In Clinic Tuesday, Wednesday, Thursday

## 2022-01-13 ENCOUNTER — VIRTUAL VISIT (OUTPATIENT)
Dept: ENDOCRINOLOGY | Facility: CLINIC | Age: 32
End: 2022-01-13
Payer: COMMERCIAL

## 2022-01-13 DIAGNOSIS — O24.112 PREGNANCY WITH TYPE 2 DIABETES MELLITUS IN SECOND TRIMESTER: Primary | ICD-10-CM

## 2022-01-13 PROCEDURE — 99213 OFFICE O/P EST LOW 20 MIN: CPT | Mod: 95 | Performed by: NURSE PRACTITIONER

## 2022-01-13 NOTE — Clinical Note
1/13/2022         RE: Carlos Oliveros  [address]  [place] [address]        Dear Colleague,    Thank you for referring your patient, Carlos Oliveros, to the [facility]. Please see a copy of my visit note below.    Carlos is a 31 year old who is being evaluated via a billable telephone visit.      What phone number would you like to be contacted at? [phone]  How would you like to obtain your AVS? Mail a copy  Phone call duration: *** minutes    Date of last OV: Consult  Reason for visit: GDM    Blood Glucose Numbers:    1/07  Fasting  165  1hour after:  B X  L X  D  171    1/08  Fasting  116  1 hour after:  B  X  L  X  D  127    1/09  Fasting  145  1 hour after:  B  X  L  X  D  107    1/10  Fasting  165  1 hour after:  B  X  L  X  D  123    1/11  Fasting  162  1 hour after:  B  X  L  X  D  135    1/12  Fasting  154  1 hour after:  B  X  L  X  D  167    1/13  Fasting 149                             Again, thank you for allowing me to participate in the care of your patient.        Sincerely,        Juana Balderas NP

## 2022-01-14 PROBLEM — Z86.79 HISTORY OF HYPERTENSION: Status: ACTIVE | Noted: 2021-11-09

## 2022-01-27 DIAGNOSIS — E11.9 TYPE 2 DIABETES MELLITUS TREATED WITHOUT INSULIN (H): ICD-10-CM

## 2022-02-04 ENCOUNTER — TELEPHONE (OUTPATIENT)
Dept: ENDOCRINOLOGY | Facility: CLINIC | Age: 32
End: 2022-02-04
Payer: COMMERCIAL

## 2022-02-04 NOTE — TELEPHONE ENCOUNTER
Please call patient to set up visits every two weeks through April 4th, 2022 on [facility]'s pregnancy clinic.  These visits can be in person or virtual per Tanja Balderas CNP.  Thank you.

## 2022-02-08 NOTE — TELEPHONE ENCOUNTER
Pt canceled her appt today and rescheduled.         Visit Type: telephone visit: pregnancy clinic   Provider: Darrian  Provider's Diagnosis (per referral form): T2 DM in pregnancy   Lab Results   Component Value Date    A1C 9.1 12/01/2021    A1C 11.9 10/08/2021    A1C 10.9 01/16/2020    A1C 10.2 06/06/2019    A1C 6.9 05/23/2018     Estimated Date of Delivery: 5/7  Pregnancy #: 6  Previous GDM: yes

## 2022-02-09 ENCOUNTER — TELEPHONE (OUTPATIENT)
Dept: ENDOCRINOLOGY | Facility: CLINIC | Age: 32
End: 2022-02-09

## 2022-02-09 ENCOUNTER — TELEPHONE (OUTPATIENT)
Dept: EDUCATION SERVICES | Facility: CLINIC | Age: 32
End: 2022-02-09

## 2022-02-09 DIAGNOSIS — E11.9 TYPE 2 DIABETES MELLITUS TREATED WITHOUT INSULIN (H): ICD-10-CM

## 2022-02-09 RX ORDER — GLUCOSAMINE HCL/CHONDROITIN SU 500-400 MG
CAPSULE ORAL
Qty: 100 EACH | Refills: 3 | Status: CANCELLED | OUTPATIENT
Start: 2022-02-09

## 2022-02-09 RX ORDER — GLUCOSAMINE HCL/CHONDROITIN SU 500-400 MG
CAPSULE ORAL
Qty: 100 EACH | Refills: 3 | Status: SHIPPED | OUTPATIENT
Start: 2022-02-09

## 2022-02-09 RX ORDER — LANCETS
EACH MISCELLANEOUS
Qty: 200 EACH | Refills: 11 | Status: CANCELLED | OUTPATIENT
Start: 2022-02-09

## 2022-02-09 NOTE — TELEPHONE ENCOUNTER
Patient aware prescriptions being sent in.    Lancets already on file at pharmacy with refills.  Called pharmacy and spoke with pharmacist.  Requested refill be prepped for  along with Lantus and alcohol wipes.  Pharmacist suggests that he will also refill pen needles to go with Lantus.

## 2022-02-09 NOTE — TELEPHONE ENCOUNTER
"Patient was on the schedule for pregnancy clinic.   During the rooming process, patient stated she needs prescriptions for:  1. Lantus \"night time insulin\"   2.  needles to poke her fingers  3.  gauze.    Patient uses [facility] which has been updated in this note.  Patient then stated she is unable to keep today's appointment as she has overbooked herself and will be gone during the appointment time.    Offered to reschedule for tomorrow but patient is unable to meet tomorrow as she has other appointments.  Scheduled patient for her next appointment in two weeks.  "

## 2022-02-15 NOTE — PROGRESS NOTES
"Carlos is a 31 year old who is being evaluated via a billable telephone visit.      What phone number would you like to be contacted at? [phone]  How would you like to obtain your AVS? Mail a copy     [facility]  ENDOCRINOLOGY     Diabetes in Pregnancy 2022    Carlos Oliveros, 1990, [id number]          Reason for visit      1. Pregnancy with type 2 diabetes mellitus in second trimester        HPI     Carlos Oliveros is a very pleasant 31 year old old female who presents for management of Diabetes Mellitus Type 2 in pregnancy.  She is currently 29w2d  pregnant . Due date is 22    Current carbohydrate intake:consistent with recommendations of 30g-60g-60g.  I have reviewed her blood glucose logs and note that the:  Fasting readings  are:above range on current regimen  Postprandial readings are:above range on current regimen  Current Lantus dose: 24  Current Prandial insulin: 4  Blood glucose logs/meter brought in and data reviewed and incorporated into decision-making.  Planned delivery at:   OBGYN: Anat  Therapy/Interventions in the past:  She has been seen by the Diabetes Educator- and has received instruction on carbohydrate counting and  consistency.  Records from referring provider and other sources have also been reviewed and incorporated into decision-making.      TODAY:    Carlos GUERRA is contacted today in f/u for management of DM 2 in pregnancy. We are joined by MIMI Ramirez.  She has not been seen in f/u since early January. She has provided BG today and she reports \"it is too painful for me to test my BG and I will only do it twice a day. We reminded her that this was in her baby's best interest, but she flatly refused to do any more testing. She also reports that her teeth are bothering her and on the morning of , she had a 70 for FBS because\" my teeth hurt and I didn't eat very much for dinner the night before\". She reports that she is taking her insulin as prescribed. We will increase her " doses today. Hetal will call her on . Pt hung up on us when we were trying to schedule because she had another incoming call.     Blood Glucose Numbers:      Fasting  162  1hour after:  B  154  L  X  D  X    218  Fasting  169  1 hour after:  B  180  L  X  D  X    2  Fasting  70  1 hour after:  B  169  L  X  D  X    2/  Fasting  154  1 hour after:  B  172  L  X  D  X      Fasting  179  1 hour after:  B  164  L  X  D  X      Fasting  164  1 hour after:  B  162  L  X  D  X      Fasting 167          Past Medical History       Patient Active Problem List   Diagnosis     Type 2 diabetes mellitus treated without insulin (H)     Hyperlipidemia     Pregnancy with type 2 diabetes mellitus in second trimester     Severe pre-eclampsia     Gestational hypertension     Breech presentation     Morbid obesity (H)     History of hypertension        Past Surgical History     Past Surgical History:   Procedure Laterality Date      SECTION N/A 2018    Procedure:  SECTION;  Surgeon: Patricia Velazquez MD;  Location: [facility];  Service:        Family History     History reviewed. No pertinent family history.    Social History     Social History     Tobacco Use     Smoking status: Never Smoker     Smokeless tobacco: Never Used   Substance Use Topics     Alcohol use: No     Drug use: No       Review of Systems     Patient has no polyuria or polydipsia, no chest pain, dyspnea or TIA's, no numbness, tingling or pain in extremities  Remainder negative except as noted in HPI.      Vital Signs     LMP 2021 (Exact Date)   Wt Readings from Last 3 Encounters:   21 88 kg (193 lb 14.4 oz)   10/08/21 84.2 kg (185 lb 9 oz)   20 87.5 kg (193 lb)       Physical Exam         Assessment     1. Pregnancy with type 2 diabetes mellitus in second trimester        Plan     1. GESTATIONAL DIABETES-  Adjust dose as follows:    - Lantus [id number]   units. Increase by 2 units every 2  days to keep fasting blood glucose below 95mg/dL  -Novolog 8  units with breakfast  -Novolog 8 units with lunch   -Novolog 8 units with dinner    We reviewed glucose goals of fasting blood glucose <95 mg/dL and 1 hour post prandial blood glucose of <140 mg/dL.    Monitor blood sugar 4 times daily: Fasting  and 1 hour after each meal.  Contact  this clinic [phone] if blood glucose is not within the above-mentioned goals.     We discussed the importance of excellent glycemic control during pregnancy to limit complications such as fetal macrosomia, shoulder dystocia,  hypoglycemia and hyperbilirubinemia.  I have discussed the patient's increased risk of recurrent GDM and/or development of type 2 diabetes later in life.        F/u with me in 2 weeks, Hetal next week.       Juana Balderas NP  2022      Lab Results     Microalbumin Urine mg/dL   Date Value Ref Range Status   10/08/2021 9.88 (H) 0.00 - 1.99 mg/dL Final       Cholesterol   Date Value Ref Range Status   10/08/2021 226 (H) <=199 mg/dL Final     Direct Measure HDL   Date Value Ref Range Status   10/08/2021 50 >=50 mg/dL Final     Comment:     HDL Cholesterol Reference Range:     0-2 years:   No reference ranges established for patients under 2 years old  at [facility] for lipid analytes.    2-8 years:  Greater than 45 mg/dL     18 years and older:   Female: Greater than or equal to 50 mg/dL   Male:   Greater than or equal to 40 mg/dL     Triglycerides   Date Value Ref Range Status   10/08/2021 262 (H) <=149 mg/dL Final             Current Medications       Phone call duration: 20 minutes    Date of last OV: 22  Reason for visit: GDM

## 2022-02-22 ENCOUNTER — TELEPHONE (OUTPATIENT)
Dept: FAMILY MEDICINE | Facility: CLINIC | Age: 32
End: 2022-02-22

## 2022-02-22 NOTE — TELEPHONE ENCOUNTER
Reason for Call:  OB Care    Detailed comments: Pt stopped into clinic requesting for Routine OB appt with Dr. Colmenares. Talked to pt, seems that Care was transferred over to [facility] for High risk pregnancy. Pt stated that she is unaware of transfer and she only went there for U/S. Called [facility] and they do not know if pt had transfer of care. TC needs to confrim with Dr. Colmenares if there is a transfer of care since pt has no been seen for Routine OB for a couple of months now.     Phone Number Patient can be reached at: Home number on file [phone] (home)    Best Time: ANY     Can we leave a detailed message on this number? YES    Call taken on 2/22/2022 at 10:22 AM by Freda Stern

## 2022-02-22 NOTE — TELEPHONE ENCOUNTER
Spoke w/ pt today in pregnancy clinic. Most recent BG readings below. Pt is eating 2 meals/day, breakfast and dinner. She states she cannot check after dinner because checking BG >2x/day is too painful. Discussed that she needs to do this for the health of the baby as we have no idea what her BG are after dinner and cannot adjust her.   Pt reports she is taking 24 units at HS and 4 units prior to eating. Denies misses doses except when her tooth was hurting and she was not eating much.   Reports lower FBG on 2/19 was from not eating much due too tooth pain.   Will increase to 30 units at HS and 8 units with meals. Will call pt again on Monday 2/28.      2/17  Fasting  162  1hour after:  B  154       2/18  Fasting  169  1 hour after:  B  180     2/19  Fasting  70   1 hour after:  B  169    2/20  Fasting  154  1 hour after:  B  172    2/21  Fasting  179  1 hour after:  B  164     2/22  Fasting  164  1 hour after:  B  162       2/23  Fasting 167    Pt will follow up in 2 weeks as scheduled, will call sooner w/ any concerns.       Visit Type: telephone visit: pregnancy clinic   Provider: Darrian  Provider's Diagnosis (per referral form): T2 DM in pregnancy         Lab Results   Component Value Date     A1C 9.1 12/01/2021     A1C 11.9 10/08/2021     A1C 10.9 01/16/2020     A1C 10.2 06/06/2019     A1C 6.9 05/23/2018      Estimated Date of Delivery: 5/7  Pregnancy #: 6  Previous GDM: yes

## 2022-02-22 NOTE — TELEPHONE ENCOUNTER
[facility] to follow up on if pt had established care and attended any OB appointments.  At this time pt has only been seen for an Ultrasound and the staff that this writer talked to stated that her notes show there was a previous issue with receiving the referral.  Staff transferred writer to the referral department to verify that the referral has been received and then they will set up an appt.      Detailed message was left on the confidential referral line requesting that they call the [facility] to verify that the referral had been received.    When [facility] returns call, staff please route message appropriately to RN's for follow up and refax referral if needed.    Yanet RODRIGUEZ RN  [facility]

## 2022-02-22 NOTE — TELEPHONE ENCOUNTER
I am concerned about this patient. I have not seen this patient for OB care for this pregnancy due to her being high risk. She has repeatedly been advised that her pregnancy is high risk and requires higher level of care with OB-GYN. She has been referred to [facility] on multiple occasions, and her appointments have been scheduled by our staff (most recently, [facility] I have used language line  to review this plan with patient.     I am going to route this to patient's PCP, Dr. Coppola. Can someone please [facility] to see if patient has established care yet, or if she has upcoming appointment?

## 2022-02-22 NOTE — TELEPHONE ENCOUNTER
Please call [facility] . Please help make appointment if no upcoming appointment. Please let the patient know that she MUST keep her OB appointments. ( Teresa called and make appt for her a few times in the past also ).    Dr. Juan David Coppola  2/22/2022 11:11 AM

## 2022-02-23 ENCOUNTER — VIRTUAL VISIT (OUTPATIENT)
Dept: ENDOCRINOLOGY | Facility: CLINIC | Age: 32
End: 2022-02-23
Payer: COMMERCIAL

## 2022-02-23 ENCOUNTER — TELEPHONE (OUTPATIENT)
Dept: FAMILY MEDICINE | Facility: CLINIC | Age: 32
End: 2022-02-23

## 2022-02-23 ENCOUNTER — TELEPHONE (OUTPATIENT)
Dept: EDUCATION SERVICES | Facility: CLINIC | Age: 32
End: 2022-02-23

## 2022-02-23 DIAGNOSIS — O24.112 PREGNANCY WITH TYPE 2 DIABETES MELLITUS IN SECOND TRIMESTER: Primary | ICD-10-CM

## 2022-02-23 PROCEDURE — 99213 OFFICE O/P EST LOW 20 MIN: CPT | Mod: 95 | Performed by: NURSE PRACTITIONER

## 2022-02-23 NOTE — TELEPHONE ENCOUNTER
[facility] will reach out to patient and schedule her for a new appointment. They have received the referral for transfer of OB care due to care level needed.    Patient had an appointment at the end of January with [facility], but she ended up being a no show for that appointment. Scheduling is going to reach out to try and reschedule that appointment plus further appointments until patient's end of pregnancy.    Closing encounter as no further action needed.    Alissa DESAI RN

## 2022-02-23 NOTE — PROGRESS NOTES
"Carlos is a 31 year old who is being evaluated via a billable telephone visit.      What phone number would you like to be contacted at? [phone]  How would you like to obtain your AVS? Mail a copy     [facility] ENDOCRINOLOGY     Diabetes in Pregnancy  3/16/2022    Carlos Oliveros, 1990, [id number]          Reason for visit      1. Pregnancy with type 2 diabetes mellitus in third trimester        HPI     Carlos Oliveros is a very pleasant 31 year old old female who presents for management of Diabetes Mellitus Type 2 in pregnancy.  She is currently 32w4d  pregnant . Due date is 22    Current carbohydrate intake:consistent with recommendations of 30g-60g-60g.  I have reviewed her blood glucose logs and note that the:  Fasting readings  are:above range on current regimen  Postprandial readings are:above range on current regimen  Current Lantus dose: 36  Current Prandial insulin: 8  Blood glucose logs/meter brought in and data reviewed and incorporated into decision-making.  Planned delivery at:   OBGYN: [facility]    Therapy/Interventions in the past:  She has been seen by the Diabetes Educator- and has received instruction on carbohydrate counting and  consistency.  Records from referring provider and other sources have also been reviewed and incorporated into decision-making.      TODAY:    Carlos is contacted today in f/u for management of DM 2 in pregnancy.  We are joined by MIMI Ramirez and assisted by Ilene, Professional . Pt has provided BG today and she continues to only test twice a day because \"her fingers hurt.\"  FBS remain quite high.  Her dinner numbers are borderline. We will increase her Lantus dose to 46 and leave her prandial insulin dose at 8. She assures us that she is taking 8 units every time she eats. Baby is moving appropriately and she is having no swelling. She reports no concerns from her OB at present.     Blood Glucose Numbers:  Future appt 3/30,  (last " Pt states lisinopril is 10mg once daily    Thank you!  [facility]     appt)      3/10  Fasting  127  D  145    3/11  Fasting  135  D  142    3/12  Fasting  129  D  143    3/13  Fasting  134  D  118    3/14  Fasting  135  D  146    3/15  Fasting  142  D  154    3/16  Fasting 128  Current: 142          Past Medical History       Patient Active Problem List   Diagnosis     Type 2 diabetes mellitus treated without insulin (H)     Hyperlipidemia     Pregnancy with type 2 diabetes mellitus in second trimester     Severe pre-eclampsia     Gestational hypertension     Breech presentation     Morbid obesity (H)     History of hypertension        Past Surgical History     Past Surgical History:   Procedure Laterality Date      SECTION N/A 2018    Procedure:  SECTION;  Surgeon: Patricia Velazquez MD;  Location: [facility] OR;  Service:        Family History     History reviewed. No pertinent family history.    Social History     Social History     Tobacco Use     Smoking status: Never Smoker     Smokeless tobacco: Never Used   Substance Use Topics     Alcohol use: No     Drug use: No       Review of Systems     Patient has no polyuria or polydipsia, no chest pain, dyspnea or TIA's, no numbness, tingling or pain in extremities  Remainder negative except as noted in HPI.      Vital Signs     LMP 2021 (Exact Date)   Wt Readings from Last 3 Encounters:   21 88 kg (193 lb 14.4 oz)   10/08/21 84.2 kg (185 lb 9 oz)   20 87.5 kg (193 lb)       Physical Exam         Assessment     1. Pregnancy with type 2 diabetes mellitus in third trimester        Plan     1. GESTATIONAL DIABETES-  Adjust dose as follows:     - Lantus insulin 46   units. Increase by 2 units every 2 days to keep fasting blood glucose below 95mg/dL  -Novolog 8  units with breakfast  -Novolog 8 units with lunch   -Novolog 8 units with dinner     We reviewed glucose goals of fasting blood glucose <95 mg/dL and 1 hour post prandial blood glucose of <140 mg/dL.    Monitor blood sugar 4 times  daily: Fasting  and 1 hour after each meal.  Contact  this clinic [phone] if blood glucose is not within the above-mentioned goals.      We discussed the importance of excellent glycemic control during pregnancy to limit complications such as fetal macrosomia, shoulder dystocia,  hypoglycemia and hyperbilirubinemia.  I have discussed the patient's increased risk of recurrent GDM and/or development of type 2 diabetes later in life.          F/u with me in 2 weeks, Hetal next week.         Juana Balderas NP  3/16/2022       Lab Results     Microalbumin Urine mg/dL   Date Value Ref Range Status   10/08/2021 9.88 (H) 0.00 - 1.99 mg/dL Final       Cholesterol   Date Value Ref Range Status   10/08/2021 226 (H) <=199 mg/dL Final     Direct Measure HDL   Date Value Ref Range Status   10/08/2021 50 >=50 mg/dL Final     Comment:     HDL Cholesterol Reference Range:     0-2 years:   No reference ranges established for patients under 2 years old  at [facility] for lipid analytes.    2-8 years:  Greater than 45 mg/dL     18 years and older:   Female: Greater than or equal to 50 mg/dL   Male:   Greater than or equal to 40 mg/dL     Triglycerides   Date Value Ref Range Status   10/08/2021 262 (H) <=149 mg/dL Final             Current Medications         Phone call duration: 20 minutes    Date of last OV:   Reason for visit: GDM

## 2022-02-23 NOTE — TELEPHONE ENCOUNTER
Can we arrange transportation too?  The family missed multiple appointments in the past due to no ride.    Thank you,    Dr. Juan David Coppola  2/23/2022 4:25 PM

## 2022-02-23 NOTE — TELEPHONE ENCOUNTER
Writer talked with Krystal at [facility] in regards to pt referral and appt.  Krystal stated that they have not received the referral and requested that the referral be resent to them with the most recent office visit related to pregnancy.  FAX number to have these items sent to is [phone] with attention to Krystal.    Krystal stated that the pt has been scheduled for an appt on March 3rd, 2022 with there facility.    Referral and Virtual Visit on 2/23/2022 FAXED to [facility] to Krystal at number above.    Yanet RODRIGUEZ RN  [facility]

## 2022-02-24 NOTE — TELEPHONE ENCOUNTER
Patient's appointment with [facility] is on March 3 at 10:45. They would like patient to arrive by 10:30 at the latest.    The appointment is at [facility], [place], [address].    Thank you Catherine!!

## 2022-03-04 ENCOUNTER — TELEPHONE (OUTPATIENT)
Dept: EDUCATION SERVICES | Facility: CLINIC | Age: 32
End: 2022-03-04
Payer: COMMERCIAL

## 2022-03-04 NOTE — TELEPHONE ENCOUNTER
Pt last seen in PC 2/23/22 - she was not checking pp dinner, she was advised to start checking and was advised to increase to 30 units at HS and 8 units/meal.   Last spoke w/ pt on 2/28/22 - pt reported broken meter. New meter was sent to [facility] and was confirmed ready for , pt was instructed to pick this up ASAP. Advised would be calling her later this week (today) to get BG readings.       Called pt today through  services. Pt did not answer.  LVM for pt to call back.       Calista will call pt next week to get BG readings for Dr. Crowley to review.   Pt will f/u in PC the following week 3/16/22 as scheduled.             Provider: Darrian  Provider's Diagnosis (per referral form): T2 DM in pregnancy             Lab Results   Component Value Date     A1C 9.1 12/01/2021     A1C 11.9 10/08/2021     A1C 10.9 01/16/2020     A1C 10.2 06/06/2019     A1C 6.9 05/23/2018      Estimated Date of Delivery: 5/7/22  Pregnancy #: 6  Previous GDM: yes

## 2022-03-09 ENCOUNTER — TELEPHONE (OUTPATIENT)
Dept: ENDOCRINOLOGY | Facility: CLINIC | Age: 32
End: 2022-03-09
Payer: COMMERCIAL

## 2022-03-09 NOTE — TELEPHONE ENCOUNTER
Reviewed glucose data and pregnancy clinic notes.    Recommend the following:  -Check blood glucose when feeling shaky: If glucose is under 70 during these episodes, please call and let us know as soon as possible  -Increase Lantus to 36 units daily  -Continue mealtime insulin without changes  -Need to check blood glucose fasting and 1 or 2 hours after each meal: This is essential  -Follow-up in pregnancy clinic on 3/16/2022 as scheduled

## 2022-03-09 NOTE — TELEPHONE ENCOUNTER
Lantus:  30units  At bedtime  NovoLO units before each meal    Blood Glucose Numbers:    Only checking twice per day due to painfull fingers.  Checks fasting and 2 hours after a meal.    Feels shaky in the mornings    3/3/22:    Fastin  2 hours :  150    3/4/22    Fastin  2 hours:  145    3/5/22:    Fastin  2 hours:  145    3/6/22:    Fastin  2 hours:  147    3/7/22:    Fastin  2 hours:  146    3/8/22:    Fastin  2 hours:  148    3/9/22:    Fastin

## 2022-03-15 NOTE — TELEPHONE ENCOUNTER
Spoke w/ pt today in pregnancy clinic. Most recent BG readings below. Pt is taking 36 units of lantus at HS. Denies missing any doses. Taking 8 units/meal.   Will increase to 46 units at HS, continue 8 units/meal.     3/10  Fasting  127  D  145     3/11  Fasting  135  D  142     3/12  Fasting  129  D  143     3/13  Fasting  134  D  118     3/14  Fasting  135  D  146     3/15  Fasting  142  D  154     3/16  Fasting 128  Current: 142    Pt will follow up in 1 weeks as scheduled, will call sooner w/ any concerns.       Visit Type: telephone visit: pregnancy clinic   Provider: Darrian  Provider's Diagnosis (per referral form): T2 DM in pregnancy             Lab Results   Component Value Date     A1C 9.1 12/01/2021     A1C 11.9 10/08/2021     A1C 10.9 01/16/2020     A1C 10.2 06/06/2019     A1C 6.9 05/23/2018      Estimated Date of Delivery: 5/7  Pregnancy #: 6  Previous GDM: yes

## 2022-03-16 ENCOUNTER — VIRTUAL VISIT (OUTPATIENT)
Dept: ENDOCRINOLOGY | Facility: CLINIC | Age: 32
End: 2022-03-16
Payer: COMMERCIAL

## 2022-03-16 ENCOUNTER — TELEPHONE (OUTPATIENT)
Dept: ENDOCRINOLOGY | Facility: CLINIC | Age: 32
End: 2022-03-16

## 2022-03-16 ENCOUNTER — TELEPHONE (OUTPATIENT)
Dept: EDUCATION SERVICES | Facility: CLINIC | Age: 32
End: 2022-03-16

## 2022-03-16 DIAGNOSIS — O24.113 PREGNANCY WITH TYPE 2 DIABETES MELLITUS IN THIRD TRIMESTER: Primary | ICD-10-CM

## 2022-03-16 PROCEDURE — 99213 OFFICE O/P EST LOW 20 MIN: CPT | Mod: 95 | Performed by: NURSE PRACTITIONER

## 2022-03-22 NOTE — TELEPHONE ENCOUNTER
Pt did not show for PC appt today.           Provider: Darrian  Provider's Diagnosis (per referral form): T2 DM in pregnancy             Lab Results   Component Value Date     A1C 9.1 12/01/2021     A1C 11.9 10/08/2021     A1C 10.9 01/16/2020     A1C 10.2 06/06/2019     A1C 6.9 05/23/2018      Estimated Date of Delivery: 5/7  Pregnancy #: 6  Previous GDM: yes

## 2022-03-23 ENCOUNTER — TELEPHONE (OUTPATIENT)
Dept: EDUCATION SERVICES | Facility: CLINIC | Age: 32
End: 2022-03-23

## 2022-03-30 ENCOUNTER — TELEPHONE (OUTPATIENT)
Dept: EDUCATION SERVICES | Facility: CLINIC | Age: 32
End: 2022-03-30

## 2022-04-19 ENCOUNTER — HOSPITAL ENCOUNTER (INPATIENT)
Facility: HOSPITAL | Age: 32
LOS: 4 days | Discharge: HOME HEALTH | End: 2022-04-23
Attending: OBSTETRICS & GYNECOLOGY | Admitting: STUDENT IN AN ORGANIZED HEALTH CARE EDUCATION/TRAINING PROGRAM
Payer: COMMERCIAL

## 2022-04-19 DIAGNOSIS — O24.113 TYPE 2 DIABETES MELLITUS COMPLICATING PREGNANCY IN THIRD TRIMESTER, ANTEPARTUM: ICD-10-CM

## 2022-04-19 DIAGNOSIS — E11.9 TYPE 2 DIABETES MELLITUS WITHOUT COMPLICATION, WITHOUT LONG-TERM CURRENT USE OF INSULIN (H): ICD-10-CM

## 2022-04-19 DIAGNOSIS — G89.18 ACUTE POST-OPERATIVE PAIN: ICD-10-CM

## 2022-04-19 DIAGNOSIS — D50.8 IRON DEFICIENCY ANEMIA SECONDARY TO INADEQUATE DIETARY IRON INTAKE: ICD-10-CM

## 2022-04-19 DIAGNOSIS — O13.3 GESTATIONAL HYPERTENSION, THIRD TRIMESTER: ICD-10-CM

## 2022-04-19 DIAGNOSIS — K59.09 OTHER CONSTIPATION: ICD-10-CM

## 2022-04-19 PROBLEM — Z36.89 ENCOUNTER FOR TRIAGE IN PREGNANT PATIENT: Status: ACTIVE | Noted: 2022-04-19

## 2022-04-19 LAB
ALBUMIN MFR UR ELPH: 82.3 MG/DL
ALT SERPL W P-5'-P-CCNC: 18 U/L (ref 0–45)
APTT PPP: 29 SECONDS (ref 22–38)
AST SERPL W P-5'-P-CCNC: 35 U/L (ref 0–40)
CREAT SERPL-MCNC: 0.52 MG/DL (ref 0.6–1.1)
CREAT UR-MCNC: 50 MG/DL
ERYTHROCYTE [DISTWIDTH] IN BLOOD BY AUTOMATED COUNT: 25.1 % (ref 10–15)
FERRITIN SERPL-MCNC: 4 NG/ML (ref 10–130)
GFR SERPL CREATININE-BSD FRML MDRD: >90 ML/MIN/1.73M2
GLUCOSE BLDC GLUCOMTR-MCNC: 152 MG/DL (ref 70–99)
GLUCOSE BLDC GLUCOMTR-MCNC: 165 MG/DL (ref 70–99)
GLUCOSE BLDC GLUCOMTR-MCNC: 188 MG/DL (ref 70–99)
HBA1C MFR BLD: 9.5 %
HCT VFR BLD AUTO: 24.7 % (ref 35–47)
HGB BLD-MCNC: 6.6 G/DL (ref 11.7–15.7)
HOLD SPECIMEN: NORMAL
INR PPP: 0.97 (ref 0.9–1.15)
MCH RBC QN AUTO: 13.1 PG (ref 26.5–33)
MCHC RBC AUTO-ENTMCNC: 26.7 G/DL (ref 31.5–36.5)
MCV RBC AUTO: 49 FL (ref 78–100)
PLATELET # BLD AUTO: 358 10E3/UL (ref 150–450)
PROT/CREAT 24H UR: 1.65 MG/MG CR
RBC # BLD AUTO: 5.02 10E6/UL (ref 3.8–5.2)
SARS-COV-2 RNA RESP QL NAA+PROBE: NEGATIVE
WBC # BLD AUTO: 12.1 10E3/UL (ref 4–11)

## 2022-04-19 PROCEDURE — 87653 STREP B DNA AMP PROBE: CPT | Performed by: STUDENT IN AN ORGANIZED HEALTH CARE EDUCATION/TRAINING PROGRAM

## 2022-04-19 PROCEDURE — 36415 COLL VENOUS BLD VENIPUNCTURE: CPT | Performed by: OBSTETRICS & GYNECOLOGY

## 2022-04-19 PROCEDURE — 87340 HEPATITIS B SURFACE AG IA: CPT | Performed by: STUDENT IN AN ORGANIZED HEALTH CARE EDUCATION/TRAINING PROGRAM

## 2022-04-19 PROCEDURE — 87077 CULTURE AEROBIC IDENTIFY: CPT | Performed by: STUDENT IN AN ORGANIZED HEALTH CARE EDUCATION/TRAINING PROGRAM

## 2022-04-19 PROCEDURE — 82728 ASSAY OF FERRITIN: CPT | Performed by: STUDENT IN AN ORGANIZED HEALTH CARE EDUCATION/TRAINING PROGRAM

## 2022-04-19 PROCEDURE — 84460 ALANINE AMINO (ALT) (SGPT): CPT | Performed by: OBSTETRICS & GYNECOLOGY

## 2022-04-19 PROCEDURE — 84450 TRANSFERASE (AST) (SGOT): CPT | Performed by: OBSTETRICS & GYNECOLOGY

## 2022-04-19 PROCEDURE — 85610 PROTHROMBIN TIME: CPT | Performed by: OBSTETRICS & GYNECOLOGY

## 2022-04-19 PROCEDURE — 87635 SARS-COV-2 COVID-19 AMP PRB: CPT | Performed by: OBSTETRICS & GYNECOLOGY

## 2022-04-19 PROCEDURE — 120N000001 HC R&B MED SURG/OB

## 2022-04-19 PROCEDURE — 86780 TREPONEMA PALLIDUM: CPT | Performed by: STUDENT IN AN ORGANIZED HEALTH CARE EDUCATION/TRAINING PROGRAM

## 2022-04-19 PROCEDURE — 85730 THROMBOPLASTIN TIME PARTIAL: CPT | Performed by: OBSTETRICS & GYNECOLOGY

## 2022-04-19 PROCEDURE — 83036 HEMOGLOBIN GLYCOSYLATED A1C: CPT | Performed by: OBSTETRICS & GYNECOLOGY

## 2022-04-19 PROCEDURE — 86762 RUBELLA ANTIBODY: CPT | Performed by: STUDENT IN AN ORGANIZED HEALTH CARE EDUCATION/TRAINING PROGRAM

## 2022-04-19 PROCEDURE — 250N000012 HC RX MED GY IP 250 OP 636 PS 637: Performed by: STUDENT IN AN ORGANIZED HEALTH CARE EDUCATION/TRAINING PROGRAM

## 2022-04-19 PROCEDURE — 82962 GLUCOSE BLOOD TEST: CPT

## 2022-04-19 PROCEDURE — 258N000003 HC RX IP 258 OP 636: Performed by: STUDENT IN AN ORGANIZED HEALTH CARE EDUCATION/TRAINING PROGRAM

## 2022-04-19 PROCEDURE — 85027 COMPLETE CBC AUTOMATED: CPT | Performed by: OBSTETRICS & GYNECOLOGY

## 2022-04-19 PROCEDURE — 250N000011 HC RX IP 250 OP 636: Performed by: STUDENT IN AN ORGANIZED HEALTH CARE EDUCATION/TRAINING PROGRAM

## 2022-04-19 PROCEDURE — 999N000127 HC STATISTIC PERIPHERAL IV START W US GUIDANCE

## 2022-04-19 PROCEDURE — 84156 ASSAY OF PROTEIN URINE: CPT | Performed by: OBSTETRICS & GYNECOLOGY

## 2022-04-19 PROCEDURE — 250N000013 HC RX MED GY IP 250 OP 250 PS 637: Performed by: STUDENT IN AN ORGANIZED HEALTH CARE EDUCATION/TRAINING PROGRAM

## 2022-04-19 PROCEDURE — 86803 HEPATITIS C AB TEST: CPT | Performed by: STUDENT IN AN ORGANIZED HEALTH CARE EDUCATION/TRAINING PROGRAM

## 2022-04-19 PROCEDURE — 82565 ASSAY OF CREATININE: CPT | Performed by: OBSTETRICS & GYNECOLOGY

## 2022-04-19 RX ORDER — DIPHENHYDRAMINE HCL 25 MG
25 CAPSULE ORAL EVERY 6 HOURS PRN
Status: DISCONTINUED | OUTPATIENT
Start: 2022-04-19 | End: 2022-04-20 | Stop reason: HOSPADM

## 2022-04-19 RX ORDER — PRENATAL VIT/IRON FUM/FOLIC AC 27MG-0.8MG
1 TABLET ORAL DAILY
Status: DISCONTINUED | OUTPATIENT
Start: 2022-04-19 | End: 2022-04-20 | Stop reason: HOSPADM

## 2022-04-19 RX ORDER — DEXTROSE MONOHYDRATE 25 G/50ML
25-50 INJECTION, SOLUTION INTRAVENOUS
Status: DISCONTINUED | OUTPATIENT
Start: 2022-04-19 | End: 2022-04-20 | Stop reason: HOSPADM

## 2022-04-19 RX ORDER — ONDANSETRON 4 MG/1
4 TABLET, ORALLY DISINTEGRATING ORAL EVERY 6 HOURS PRN
Status: DISCONTINUED | OUTPATIENT
Start: 2022-04-19 | End: 2022-04-20 | Stop reason: HOSPADM

## 2022-04-19 RX ORDER — METOCLOPRAMIDE HYDROCHLORIDE 5 MG/ML
10 INJECTION INTRAMUSCULAR; INTRAVENOUS EVERY 6 HOURS PRN
Status: DISCONTINUED | OUTPATIENT
Start: 2022-04-19 | End: 2022-04-20 | Stop reason: HOSPADM

## 2022-04-19 RX ORDER — SIMETHICONE 80 MG
80 TABLET,CHEWABLE ORAL EVERY 6 HOURS PRN
Status: DISCONTINUED | OUTPATIENT
Start: 2022-04-19 | End: 2022-04-20 | Stop reason: HOSPADM

## 2022-04-19 RX ORDER — MAGNESIUM HYDROXIDE/ALUMINUM HYDROXICE/SIMETHICONE 120; 1200; 1200 MG/30ML; MG/30ML; MG/30ML
30 SUSPENSION ORAL
Status: DISCONTINUED | OUTPATIENT
Start: 2022-04-19 | End: 2022-04-20 | Stop reason: HOSPADM

## 2022-04-19 RX ORDER — PROCHLORPERAZINE 25 MG
25 SUPPOSITORY, RECTAL RECTAL EVERY 12 HOURS PRN
Status: DISCONTINUED | OUTPATIENT
Start: 2022-04-19 | End: 2022-04-20 | Stop reason: HOSPADM

## 2022-04-19 RX ORDER — DIPHENHYDRAMINE HYDROCHLORIDE 50 MG/ML
25 INJECTION INTRAMUSCULAR; INTRAVENOUS EVERY 6 HOURS PRN
Status: DISCONTINUED | OUTPATIENT
Start: 2022-04-19 | End: 2022-04-20 | Stop reason: HOSPADM

## 2022-04-19 RX ORDER — HYDROXYZINE HYDROCHLORIDE 50 MG/1
50 TABLET, FILM COATED ORAL EVERY 6 HOURS PRN
Status: DISCONTINUED | OUTPATIENT
Start: 2022-04-19 | End: 2022-04-20 | Stop reason: HOSPADM

## 2022-04-19 RX ORDER — ONDANSETRON 2 MG/ML
4 INJECTION INTRAMUSCULAR; INTRAVENOUS EVERY 6 HOURS PRN
Status: DISCONTINUED | OUTPATIENT
Start: 2022-04-19 | End: 2022-04-20 | Stop reason: HOSPADM

## 2022-04-19 RX ORDER — BISACODYL 10 MG
10 SUPPOSITORY, RECTAL RECTAL DAILY PRN
Status: DISCONTINUED | OUTPATIENT
Start: 2022-04-21 | End: 2022-04-20 | Stop reason: HOSPADM

## 2022-04-19 RX ORDER — PROCHLORPERAZINE MALEATE 10 MG
10 TABLET ORAL EVERY 6 HOURS PRN
Status: DISCONTINUED | OUTPATIENT
Start: 2022-04-19 | End: 2022-04-20 | Stop reason: HOSPADM

## 2022-04-19 RX ORDER — ACETAMINOPHEN 325 MG/1
650 TABLET ORAL EVERY 4 HOURS PRN
Status: DISCONTINUED | OUTPATIENT
Start: 2022-04-19 | End: 2022-04-20 | Stop reason: HOSPADM

## 2022-04-19 RX ORDER — AMOXICILLIN 250 MG
1 CAPSULE ORAL 2 TIMES DAILY
Status: DISCONTINUED | OUTPATIENT
Start: 2022-04-19 | End: 2022-04-20 | Stop reason: HOSPADM

## 2022-04-19 RX ORDER — LIDOCAINE 40 MG/G
CREAM TOPICAL
Status: DISCONTINUED | OUTPATIENT
Start: 2022-04-19 | End: 2022-04-19 | Stop reason: HOSPADM

## 2022-04-19 RX ORDER — AMOXICILLIN 250 MG
2 CAPSULE ORAL 2 TIMES DAILY
Status: DISCONTINUED | OUTPATIENT
Start: 2022-04-19 | End: 2022-04-20 | Stop reason: HOSPADM

## 2022-04-19 RX ORDER — METOCLOPRAMIDE 10 MG/1
10 TABLET ORAL EVERY 6 HOURS PRN
Status: DISCONTINUED | OUTPATIENT
Start: 2022-04-19 | End: 2022-04-20 | Stop reason: HOSPADM

## 2022-04-19 RX ORDER — PANTOPRAZOLE SODIUM 40 MG/1
40 TABLET, DELAYED RELEASE ORAL
Status: DISCONTINUED | OUTPATIENT
Start: 2022-04-20 | End: 2022-04-20 | Stop reason: HOSPADM

## 2022-04-19 RX ORDER — NICOTINE POLACRILEX 4 MG
15-30 LOZENGE BUCCAL
Status: DISCONTINUED | OUTPATIENT
Start: 2022-04-19 | End: 2022-04-20 | Stop reason: HOSPADM

## 2022-04-19 RX ADMIN — IRON SUCROSE 300 MG: 20 INJECTION, SOLUTION INTRAVENOUS at 19:51

## 2022-04-19 RX ADMIN — PRENATAL VIT W/ FE FUMARATE-FA TAB 27-0.8 MG 1 TABLET: 27-0.8 TAB at 18:45

## 2022-04-19 RX ADMIN — INSULIN GLARGINE 45 UNITS: 100 INJECTION, SOLUTION SUBCUTANEOUS at 22:57

## 2022-04-19 ASSESSMENT — ACTIVITIES OF DAILY LIVING (ADL)
DRESSING/BATHING_DIFFICULTY: NO
DOING_ERRANDS_INDEPENDENTLY_DIFFICULTY: NO
WEAR_GLASSES_OR_BLIND: NO
WALKING_OR_CLIMBING_STAIRS_DIFFICULTY: NO
DIFFICULTY_EATING/SWALLOWING: NO
HEARING_DIFFICULTY_OR_DEAF: NO
TOILETING_ISSUES: NO
CONCENTRATING,_REMEMBERING_OR_MAKING_DECISIONS_DIFFICULTY: NO
FALL_HISTORY_WITHIN_LAST_SIX_MONTHS: NO
DIFFICULTY_COMMUNICATING: NO
CHANGE_IN_FUNCTIONAL_STATUS_SINCE_ONSET_OF_CURRENT_ILLNESS/INJURY: NO

## 2022-04-19 NOTE — PLAN OF CARE
Carlos Oliveros is here for a pregnancy eval. She states Dr. Patel sent me here to have a .      She states positive fetal movement. No leaking of fluid or bleeding. She states no contractions, but her abdomen feels big and bloated and heavy. Carlos Oliveros speaks Kellie and an jabber was used for interpretation.     Carlos Oliveros was orientated to the room and call light. Monitors applied. Blood sugar taken for IDGDM. She states she hasn't taken any medicine for 2-3 weeks because her belly it too big.     Covid swab also obtained for possible repeat  tomorrow.    When asking about formula or breastfeeding her infant. Carlos Oliveros stated formula because she doesn't have any milk. RN explained with the  that right now she has colostrum and that if she wants to breastfeed she will be able to. Also the more the breasts are stimulated the more milk she'll have later. Her reply was that she does not have milk. RN stated we can talk about it later.

## 2022-04-19 NOTE — PROGRESS NOTES
Page sent to Isamar Ayala regarding positive Quantferon Gold Test in October.  Unsure of what precautions and/or workup patient needs now that she is admitted to [facility].  She was then seen by [facility], but no further workup completed due to being pregnant.  Progress note from [facility] notes that patient desires further tests/treatment after baby is born.

## 2022-04-19 NOTE — PROVIDER NOTIFICATION
04/19/22 1850   Provider Notification   Provider Name/Title dr. helms   Method of Notification Phone   Request Evaluate - Remote   Notification Reason Lab/Diagnostic Study     Pre meal sugar was 152 however, now Eh Lae says she is not going to eat. So wait to give insulin until she is ready to eat.    Notified Dr. Helms of HGB 6.6    MD ordered prenatal panel.

## 2022-04-19 NOTE — PROGRESS NOTES
RN notified Eh Domo,    She will be able to eat tonight, however, Dr. Saldana will need sometime to write orders for insulin first.    After midnight she will not be able to eat

## 2022-04-19 NOTE — H&P
Date: 2022  Time: 6:21 PM    Admission H&P  Eh Giselle Oliveros,  1990, MRN [id number]    PCP: Juan David Coppola, [phone]  Primary OB: Dr Patel   Code status:  No Order              Chief Complaint: Poorly controlled type 2 DM       OBSTETRICAL / DATING HISTORY:  Estimated Date of Delivery: Estimated Date of Delivery: May 7, 2022  Gestational Age: 37w3d    OB History    Para Term  AB Living   6 5 5 0 0 5   SAB IAB Ectopic Multiple Live Births   0 0 0 0 5      # Outcome Date GA Lbr Garrett/2nd Weight Sex Delivery Anes PTL Lv   6 Current            5 Term 18 39w1d  5.05 kg (11 lb 2.1 oz) M CS-LTranv Spinal N LISA      Name: SEVEN,MALE-      Apgar1: 8  Apgar5: 8   4 Term 14 40w0d   F Vag-Spont  N LISA   3 Term 12 40w0d   F Vag-Spont  N LISA   2 Term 11 40w0d   F Vag-Spont  N LISA   1 Term 10/09/09 41w0d   F Vag-Spont  N LISA       HPI:    Eh Giselle Oliveros is a 31 year old year old at 37w3d weeks. She presents for admission today from clinic due to poorly controlled type 2 DM and elevated BP in clinic.     She has no complaints on admission.  Feels good fetal movement and no contractions.  She has not been taking her insulin for at least the past few weeks.    Pregnancy was confirmed 10/08/2021 by her PCP Dr Coppola, she was referred to [facility] for OB care due to poorly controlled type 2 DM.  She did have an early US on 10/18/2021 at 11 weeks with EDC of 2022.  Patient presented at 31 weeks to [facility] as a transfer of care, however review of [facility] records indicates that she did not have prenatal care prior to this visit.  She did have an anatomy US at [facility] on 2022.  She did complete weekly BPP's for 3 weeks starting at 31 weejs, EFW was 85%ile at 31 weeks.  Last BPP was 3/17 prior to presenting to clinic today.  She was found to have polyhydramnios with JAMIL of 39 and mildly elevated BP.  She was sent to L&D for admission for gycemic control in anticipation of delivery via  repeat     OB Hx: 4 prior 's followed by primary     Medical History  Past Medical History:   Diagnosis Date     Diabetes mellitus (H)     IDGDM vs Type 2     Hypertension     gestational hypertension       Surgical History  Past Surgical History:   Procedure Laterality Date      SECTION N/A 2018    Procedure:  SECTION;  Surgeon: Patricia Velazquez MD;  Location: [facility] OR;  Service:        Social History  Social History     Socioeconomic History     Marital status:      Spouse name: Not on file     Number of children: Not on file     Years of education: Not on file     Highest education level: Not on file   Occupational History     Not on file   Tobacco Use     Smoking status: Never Smoker     Smokeless tobacco: Never Used   Substance and Sexual Activity     Alcohol use: No     Drug use: No     Sexual activity: Yes   Other Topics Concern     Not on file   Social History Narrative     Not on file     Social Determinants of Health     Financial Resource Strain: Not on file   Food Insecurity: Not on file   Transportation Needs: Not on file   Physical Activity: Not on file   Stress: Not on file   Social Connections: Not on file   Intimate Partner Violence: Not on file   Housing Stability: Not on file       Allergies   Allergen Reactions     No Known Drug Allergies Unknown       Medications Prior to Admission   Medication Sig Dispense Refill Last Dose     alcohol swab prep pads Use to swab area of injection/dennise as directed. 100 each 3 Past Month at Unknown time     blood glucose (NO BRAND SPECIFIED) test strip Use to test blood sugar 4  times daily or as directed. To accompany: Blood Glucose Monitor Brands: per insurance (has been using Accu-Chek Guide). 400 strip 3 Past Month at Unknown time     blood glucose monitoring (NO BRAND SPECIFIED) meter device kit Use to test blood sugar two times daily or as directed. Preferred blood glucose meter OR supplies to  accompany: Blood Glucose Monitor Brands: per insurance. 1 kit 0 Past Month at Unknown time     Blood Glucose Monitoring Suppl (ACCU-CHEK GUIDE ME) w/Device KIT 1 each daily Check blood sugars 3 times per day 1 kit 0 Past Month at Unknown time     insulin aspart (NOVOLOG PEN) 100 UNIT/ML pen 4 units injected before each meal. 15 mL 1 Past Month at Unknown time     insulin glargine (LANTUS PEN) 100 UNIT/ML pen Inject 30 units at bedtime.  Increase as directed.  Dean daily dose 50 units. 15 mL 1 Past Month at Unknown time     insulin pen needle (32G X 4 MM) 32G X 4 MM miscellaneous Use 4 pen needles daily to inject insulin or as directed. 200 each 1 Past Month at Unknown time     thin (NO BRAND SPECIFIED) lancets Use to check glucose 4 times daily.  To accompany: Blood Glucose Monitor Brands: per insurance. 200 each 11 Past Month at Unknown time     Prenatal Vit-Fe Fumarate-FA (PRENATAL MULTIVITAMIN  WITH IRON) 28-0.8 MG TABS Take 1 tablet by mouth daily (Patient not taking: Reported on 4/19/2022) 90 tablet 3 Not Taking at Unknown time       Review of Systems:  Otherwise negative except per HPI    Physical Exam:  Temp:  [98.7  F (37.1  C)] 98.7  F (37.1  C)  Resp:  [20] 20  BP: (140-148)/(87-88) 148/87    BP (!) 148/87   Temp 98.7  F (37.1  C) (Oral)   Resp 20   Ht 1.524 m (5')   Wt 99.8 kg (220 lb)   LMP 07/28/2021 (Exact Date)   BMI 42.97 kg/m      Geenral: NAD  CV: RRR  Lungs: clear  Abdomen: soft, gravid, obese, edematous    BPP Today 8/8    Pertinent Labs  Admission on 04/19/2022   Component Date Value Ref Range Status     AST 04/19/2022 35  0 - 40 U/L Final     ALT 04/19/2022 18  0 - 45 U/L Final     INR 04/19/2022 0.97  0.90 - 1.15 Final     aPTT 04/19/2022 29  22 - 38 Seconds Final     Creatinine 04/19/2022 0.52 (A) 0.60 - 1.10 mg/dL Final     GFR Estimate 04/19/2022 >90  >60 mL/min/1.73m2 Final    Effective December 21, 2021 eGFRcr in adults is calculated using the 2021 CKD-EPI creatinine equation which  includes age and gender (Karmen hawkins al., NEJ, DOI: 10.1056/EWLYyo1374876)     Hemoglobin A1C 04/19/2022 9.5 (A) <=5.6 % Final      Prediabetes: 5.7 to 6.4%        Diabetes:  >=6.5%     Patients with Hgb F >5%, total bilirubin >10.0 mg/dL, abnormal red cell turnover, severe renal or hepatic disease or malignancy should not have this A1C method used to diagnose or monitor diabetes.      GLUCOSE BY METER POCT 04/19/2022 188 (A) 70 - 99 mg/dL Final     Hold Specimen 04/19/2022 JI   Final   Lab on 12/01/2021   Component Date Value Ref Range Status     Hemoglobin A1C 12/01/2021 9.1 (A) 0.0 - 5.6 % Final    Normal <5.7%   Prediabetes 5.7-6.4%    Diabetes 6.5% or higher     Note: Adopted from ADA consensus guidelines.   Office Visit on 10/08/2021   Component Date Value Ref Range Status     Cholesterol 10/08/2021 226 (A) <=199 mg/dL Final     Triglycerides 10/08/2021 262 (A) <=149 mg/dL Final     Direct Measure HDL 10/08/2021 50  >=50 mg/dL Final    HDL Cholesterol Reference Range:     0-2 years:   No reference ranges established for patients under 2 years old  at [facility] for lipid analytes.    2-8 years:  Greater than 45 mg/dL     18 years and older:   Female: Greater than or equal to 50 mg/dL   Male:   Greater than or equal to 40 mg/dL     LDL Cholesterol Calculated 10/08/2021 124  <=129 mg/dL Final     Patient Fasting > 8hrs? 10/08/2021 Yes   Final     hCG Urine Qualitative 10/08/2021 Positive (A) Negative Final    This test is for screening purposes.  Results should be interpreted along with the clinical picture.  Confirmation testing is available if warranted by ordering [id number], HCG Quantitative Pregnancy.     Hemoglobin A1C 10/08/2021 11.9 (A) 0.0 - 5.6 % Final    Normal <5.7%   Prediabetes 5.7-6.4%    Diabetes 6.5% or higher     Note: Adopted from ADA consensus guidelines.     Sodium 10/08/2021 131 (A) 136 - 145 mmol/L Final     Potassium 10/08/2021 4.4  3.5 - 5.0 mmol/L Final     Chloride 10/08/2021  100  98 - 107 mmol/L Final     Carbon Dioxide (CO2) 10/08/2021 20 (A) 22 - 31 mmol/L Final     Anion Gap 10/08/2021 11  5 - 18 mmol/L Final     Urea Nitrogen 10/08/2021 7 (A) 8 - 22 mg/dL Final     Creatinine 10/08/2021 0.71  0.60 - 1.10 mg/dL Final     Calcium 10/08/2021 10.1  8.5 - 10.5 mg/dL Final     Glucose 10/08/2021 322 (A) 70 - 125 mg/dL Final     Alkaline Phosphatase 10/08/2021 62  45 - 120 U/L Final     AST 10/08/2021 20  0 - 40 U/L Final     ALT 10/08/2021 28  0 - 45 U/L Final     Protein Total 10/08/2021 8.2 (A) 6.0 - 8.0 g/dL Final     Albumin 10/08/2021 3.9  3.5 - 5.0 g/dL Final     Bilirubin Total 10/08/2021 0.7  0.0 - 1.0 mg/dL Final     GFR Estimate 10/08/2021 >90  >60 mL/min/1.73m2 Final    As of July 11, 2021, eGFR is calculated by the CKD-EPI creatinine equation, without race adjustment. eGFR can be influenced by muscle mass, exercise, and diet. The reported eGFR is an estimation only and is only applicable if the renal function is stable.     Quantiferon Nil Tube 10/08/2021 0.22  IU/mL Final     Quantiferon TB1 Tube 10/08/2021 0.23  IU/mL Final     Quantiferon TB2 Tube 10/08/2021 0.77   Final     Quantiferon Mitogen 10/08/2021 1.33  IU/mL Final     Microalbumin Urine mg/dL 10/08/2021 9.88 (A) 0.00 - 1.99 mg/dL Final     Creatinine Urine mg/dL 10/08/2021 25  mg/dL Final     Microalbumin Urine mg/g Cr 10/08/2021 395.2 (A) <=19.9 mg/g Cr Final     Quantiferon-TB Gold Plus 10/08/2021 Positive (A) Negative Final    Interferon gamma response to M.tuberculosis antigens was detected,suggesting infection with M.tuberculosis. Positive results in patients at low risk for infection should be interpreted with caution and repeat testing on a new sample should be considered as recommended by the 2017 ATS/IDSA/CDC Clinical Prac  saige Guidelines for Diagnosis of Tuberculosis in Adults and Children      TB1 Ag minus Nil Value 10/08/2021 0.01  IU/mL Final     TB2 Ag minus Nil Value 10/08/2021 0.55  IU/mL Final      Mitogen minus Nil Result 10/08/2021 1.11  IU/mL Final     Nil Result 10/08/2021 0.22  IU/mL Final     GBS unknown    Pertinent Radiology:         Impression/Plan:  1. IUP at 37w3d weeks  2. Type 2 DM  -Patient has not taken insulin in 2-3 weeks.  She has followed intermittently with endocrine but multiple notes regarding missed visits. Will resume her most recent regimen: 8 units with each meal, 46 units at bedtime  -Delivery indicated due to poorly controlled DM, patient planning repeat .  Given poor control with initial blood sugar in 180's, will plan to resume insulin and attempt to improve glycemic control over the next 1-2 days prior to proceeding with delivery.  -A1c 9.5 on admission  3. History of   -Planning repeat for delivery  4. Anemia  -Last hemoglobin in office was 8.8, on admission today is 6.6.  Would presume iron deficiency but added on ferritin to confirm.  Also ordered dose of venofer.  Patient will be high risk for requiring blood transfusion with delivery  5. Late/limited prenatal care:  -Prenatal panel ordered on admission.  Pregnancy confirmed with her PCP at 11 weeks, but could not find documentation that she had established prenatal care prior to [facility] 31 [facility].  Could not find results of OB panel so these were ordered on admission  6. Elevated BP:   -Mild-range BP in clinic today.  Few BP's for review during pregnancy but these appear to be normal.  Likely gHTN, labs otherwise normal and no severe features at this time.  Delivery indicate at 37 weeks for gHTN, however in the setting of poorly controlled DM benefit of attempting glycemic control prior to delivery.        Provider: Rachana Saldana MD    Date: 2022  Time: 6:21 PM     Giselle Oliveros,  1990, MRN [id number]

## 2022-04-19 NOTE — PROGRESS NOTES
Tenzin with infection prevention called back and said no need for precautions at this time and because she is not symptomatic it would be considered latent TB.     Opal Allen RN April 19, 2022 6:51 PM

## 2022-04-20 ENCOUNTER — ANESTHESIA EVENT (OUTPATIENT)
Dept: OBGYN | Facility: HOSPITAL | Age: 32
End: 2022-04-20
Payer: COMMERCIAL

## 2022-04-20 ENCOUNTER — ANESTHESIA (OUTPATIENT)
Dept: OBGYN | Facility: HOSPITAL | Age: 32
End: 2022-04-20
Payer: COMMERCIAL

## 2022-04-20 LAB
ABO/RH(D): NORMAL
ANTIBODY SCREEN: NEGATIVE
APTT PPP: 32 SECONDS (ref 22–38)
BLD PROD TYP BPU: NORMAL
BLOOD COMPONENT TYPE: NORMAL
CODING SYSTEM: NORMAL
CREAT SERPL-MCNC: 0.48 MG/DL (ref 0.6–1.1)
CROSSMATCH: NORMAL
ERYTHROCYTE [DISTWIDTH] IN BLOOD BY AUTOMATED COUNT: 24.8 % (ref 10–15)
ERYTHROCYTE [DISTWIDTH] IN BLOOD BY AUTOMATED COUNT: 30.6 % (ref 10–15)
ERYTHROCYTE [DISTWIDTH] IN BLOOD BY AUTOMATED COUNT: 34.4 % (ref 10–15)
GFR SERPL CREATININE-BSD FRML MDRD: >90 ML/MIN/1.73M2
GLUCOSE BLDC GLUCOMTR-MCNC: 101 MG/DL (ref 70–99)
GLUCOSE BLDC GLUCOMTR-MCNC: 111 MG/DL (ref 70–99)
GLUCOSE BLDC GLUCOMTR-MCNC: 113 MG/DL (ref 70–99)
GLUCOSE BLDC GLUCOMTR-MCNC: 123 MG/DL (ref 70–99)
GP B STREP DNA SPEC QL NAA+PROBE: POSITIVE
HBV SURFACE AG SERPL QL IA: NONREACTIVE
HCT VFR BLD AUTO: 24.1 % (ref 35–47)
HCT VFR BLD AUTO: 26.4 % (ref 35–47)
HCT VFR BLD AUTO: 28.4 % (ref 35–47)
HCV AB SERPL QL IA: NEGATIVE
HGB BLD-MCNC: 6.5 G/DL (ref 11.7–15.7)
HGB BLD-MCNC: 7.4 G/DL (ref 11.7–15.7)
HGB BLD-MCNC: 8.3 G/DL (ref 11.7–15.7)
HOLD SPECIMEN: NORMAL
INR PPP: 1.01 (ref 0.9–1.15)
ISSUE DATE AND TIME: NORMAL
MCH RBC QN AUTO: 13.3 PG (ref 26.5–33)
MCH RBC QN AUTO: 14.7 PG (ref 26.5–33)
MCH RBC QN AUTO: 16 PG (ref 26.5–33)
MCHC RBC AUTO-ENTMCNC: 27 G/DL (ref 31.5–36.5)
MCHC RBC AUTO-ENTMCNC: 28 G/DL (ref 31.5–36.5)
MCHC RBC AUTO-ENTMCNC: 29.2 G/DL (ref 31.5–36.5)
MCV RBC AUTO: 49 FL (ref 78–100)
MCV RBC AUTO: 53 FL (ref 78–100)
MCV RBC AUTO: 55 FL (ref 78–100)
PLATELET # BLD AUTO: 315 10E3/UL (ref 150–450)
PLATELET # BLD AUTO: 323 10E3/UL (ref 150–450)
PLATELET # BLD AUTO: 342 10E3/UL (ref 150–450)
RBC # BLD AUTO: 4.9 10E6/UL (ref 3.8–5.2)
RBC # BLD AUTO: 5.03 10E6/UL (ref 3.8–5.2)
RBC # BLD AUTO: 5.2 10E6/UL (ref 3.8–5.2)
RUBV IGG SERPL QL IA: 16.1 INDEX
RUBV IGG SERPL QL IA: POSITIVE
SPECIMEN EXPIRATION DATE: NORMAL
T PALLIDUM AB SER QL: NONREACTIVE
UNIT ABO/RH: NORMAL
UNIT NUMBER: NORMAL
UNIT STATUS: NORMAL
UNIT TYPE ISBT: 6200
WBC # BLD AUTO: 10.8 10E3/UL (ref 4–11)
WBC # BLD AUTO: 11.2 10E3/UL (ref 4–11)
WBC # BLD AUTO: 11.7 10E3/UL (ref 4–11)

## 2022-04-20 PROCEDURE — P9016 RBC LEUKOCYTES REDUCED: HCPCS | Performed by: OBSTETRICS & GYNECOLOGY

## 2022-04-20 PROCEDURE — 250N000013 HC RX MED GY IP 250 OP 250 PS 637: Performed by: STUDENT IN AN ORGANIZED HEALTH CARE EDUCATION/TRAINING PROGRAM

## 2022-04-20 PROCEDURE — 258N000003 HC RX IP 258 OP 636: Performed by: ANESTHESIOLOGY

## 2022-04-20 PROCEDURE — 258N000003 HC RX IP 258 OP 636: Performed by: OBSTETRICS & GYNECOLOGY

## 2022-04-20 PROCEDURE — 85027 COMPLETE CBC AUTOMATED: CPT | Performed by: OBSTETRICS & GYNECOLOGY

## 2022-04-20 PROCEDURE — 360N000076 HC SURGERY LEVEL 3, PER MIN: Performed by: OBSTETRICS & GYNECOLOGY

## 2022-04-20 PROCEDURE — 82565 ASSAY OF CREATININE: CPT | Performed by: OBSTETRICS & GYNECOLOGY

## 2022-04-20 PROCEDURE — 99253 IP/OBS CNSLTJ NEW/EST LOW 45: CPT | Performed by: FAMILY MEDICINE

## 2022-04-20 PROCEDURE — 370N000017 HC ANESTHESIA TECHNICAL FEE, PER MIN: Performed by: OBSTETRICS & GYNECOLOGY

## 2022-04-20 PROCEDURE — 250N000011 HC RX IP 250 OP 636: Performed by: OBSTETRICS & GYNECOLOGY

## 2022-04-20 PROCEDURE — 272N000001 HC OR GENERAL SUPPLY STERILE: Performed by: OBSTETRICS & GYNECOLOGY

## 2022-04-20 PROCEDURE — 36415 COLL VENOUS BLD VENIPUNCTURE: CPT | Performed by: ANESTHESIOLOGY

## 2022-04-20 PROCEDURE — 250N000009 HC RX 250: Performed by: ANESTHESIOLOGY

## 2022-04-20 PROCEDURE — 120N000001 HC R&B MED SURG/OB

## 2022-04-20 PROCEDURE — 250N000011 HC RX IP 250 OP 636: Performed by: NURSE ANESTHETIST, CERTIFIED REGISTERED

## 2022-04-20 PROCEDURE — 85730 THROMBOPLASTIN TIME PARTIAL: CPT | Performed by: ANESTHESIOLOGY

## 2022-04-20 PROCEDURE — 250N000011 HC RX IP 250 OP 636: Performed by: ANESTHESIOLOGY

## 2022-04-20 PROCEDURE — 86901 BLOOD TYPING SEROLOGIC RH(D): CPT | Performed by: OBSTETRICS & GYNECOLOGY

## 2022-04-20 PROCEDURE — 999N000127 HC STATISTIC PERIPHERAL IV START W US GUIDANCE

## 2022-04-20 PROCEDURE — 86923 COMPATIBILITY TEST ELECTRIC: CPT | Performed by: ANESTHESIOLOGY

## 2022-04-20 PROCEDURE — 250N000013 HC RX MED GY IP 250 OP 250 PS 637: Performed by: OBSTETRICS & GYNECOLOGY

## 2022-04-20 PROCEDURE — 999N000249 HC STATISTIC C-SECTION ON UNIT

## 2022-04-20 PROCEDURE — 86923 COMPATIBILITY TEST ELECTRIC: CPT | Performed by: OBSTETRICS & GYNECOLOGY

## 2022-04-20 PROCEDURE — 85610 PROTHROMBIN TIME: CPT | Performed by: ANESTHESIOLOGY

## 2022-04-20 PROCEDURE — 85041 AUTOMATED RBC COUNT: CPT | Performed by: ANESTHESIOLOGY

## 2022-04-20 PROCEDURE — 88307 TISSUE EXAM BY PATHOLOGIST: CPT | Mod: 26 | Performed by: PATHOLOGY

## 2022-04-20 PROCEDURE — 88307 TISSUE EXAM BY PATHOLOGIST: CPT | Mod: TC | Performed by: OBSTETRICS & GYNECOLOGY

## 2022-04-20 PROCEDURE — 250N000009 HC RX 250: Performed by: OBSTETRICS & GYNECOLOGY

## 2022-04-20 PROCEDURE — 36415 COLL VENOUS BLD VENIPUNCTURE: CPT | Performed by: OBSTETRICS & GYNECOLOGY

## 2022-04-20 RX ORDER — BUPIVACAINE HYDROCHLORIDE 7.5 MG/ML
INJECTION, SOLUTION INTRASPINAL
Status: COMPLETED | OUTPATIENT
Start: 2022-04-20 | End: 2022-04-20

## 2022-04-20 RX ORDER — NALOXONE HYDROCHLORIDE 0.4 MG/ML
0.4 INJECTION, SOLUTION INTRAMUSCULAR; INTRAVENOUS; SUBCUTANEOUS
Status: DISCONTINUED | OUTPATIENT
Start: 2022-04-20 | End: 2022-04-23 | Stop reason: HOSPADM

## 2022-04-20 RX ORDER — NALBUPHINE HYDROCHLORIDE 10 MG/ML
2.5-5 INJECTION, SOLUTION INTRAMUSCULAR; INTRAVENOUS; SUBCUTANEOUS EVERY 4 HOURS PRN
Status: DISCONTINUED | OUTPATIENT
Start: 2022-04-20 | End: 2022-04-23 | Stop reason: HOSPADM

## 2022-04-20 RX ORDER — PROCHLORPERAZINE MALEATE 10 MG
10 TABLET ORAL EVERY 6 HOURS PRN
Status: DISCONTINUED | OUTPATIENT
Start: 2022-04-20 | End: 2022-04-23 | Stop reason: HOSPADM

## 2022-04-20 RX ORDER — SODIUM CHLORIDE, SODIUM LACTATE, POTASSIUM CHLORIDE, CALCIUM CHLORIDE 600; 310; 30; 20 MG/100ML; MG/100ML; MG/100ML; MG/100ML
INJECTION, SOLUTION INTRAVENOUS CONTINUOUS
Status: DISCONTINUED | OUTPATIENT
Start: 2022-04-20 | End: 2022-04-23 | Stop reason: HOSPADM

## 2022-04-20 RX ORDER — DIPHENHYDRAMINE HYDROCHLORIDE 50 MG/ML
25 INJECTION INTRAMUSCULAR; INTRAVENOUS EVERY 6 HOURS PRN
Status: DISCONTINUED | OUTPATIENT
Start: 2022-04-20 | End: 2022-04-23 | Stop reason: HOSPADM

## 2022-04-20 RX ORDER — METHYLERGONOVINE MALEATE 0.2 MG/ML
INJECTION INTRAVENOUS PRN
Status: DISCONTINUED | OUTPATIENT
Start: 2022-04-20 | End: 2022-04-20

## 2022-04-20 RX ORDER — HYDROCORTISONE 2.5 %
CREAM (GRAM) TOPICAL 3 TIMES DAILY PRN
Status: DISCONTINUED | OUTPATIENT
Start: 2022-04-20 | End: 2022-04-23 | Stop reason: HOSPADM

## 2022-04-20 RX ORDER — NALOXONE HYDROCHLORIDE 0.4 MG/ML
0.2 INJECTION, SOLUTION INTRAMUSCULAR; INTRAVENOUS; SUBCUTANEOUS
Status: DISCONTINUED | OUTPATIENT
Start: 2022-04-20 | End: 2022-04-21

## 2022-04-20 RX ORDER — DEXTROSE MONOHYDRATE 25 G/50ML
25-50 INJECTION, SOLUTION INTRAVENOUS
Status: DISCONTINUED | OUTPATIENT
Start: 2022-04-20 | End: 2022-04-23 | Stop reason: HOSPADM

## 2022-04-20 RX ORDER — CARBOPROST TROMETHAMINE 250 UG/ML
250 INJECTION, SOLUTION INTRAMUSCULAR
Status: DISCONTINUED | OUTPATIENT
Start: 2022-04-20 | End: 2022-04-23 | Stop reason: HOSPADM

## 2022-04-20 RX ORDER — OXYCODONE HYDROCHLORIDE 5 MG/1
5 TABLET ORAL EVERY 4 HOURS PRN
Status: DISCONTINUED | OUTPATIENT
Start: 2022-04-20 | End: 2022-04-20

## 2022-04-20 RX ORDER — METHYLERGONOVINE MALEATE 0.2 MG/ML
200 INJECTION INTRAVENOUS
Status: DISCONTINUED | OUTPATIENT
Start: 2022-04-20 | End: 2022-04-20 | Stop reason: HOSPADM

## 2022-04-20 RX ORDER — ONDANSETRON 4 MG/1
4 TABLET, ORALLY DISINTEGRATING ORAL EVERY 30 MIN PRN
Status: DISCONTINUED | OUTPATIENT
Start: 2022-04-20 | End: 2022-04-20

## 2022-04-20 RX ORDER — OXYCODONE HYDROCHLORIDE 5 MG/1
5 TABLET ORAL EVERY 4 HOURS PRN
Status: DISCONTINUED | OUTPATIENT
Start: 2022-04-20 | End: 2022-04-23 | Stop reason: HOSPADM

## 2022-04-20 RX ORDER — METHYLERGONOVINE MALEATE 0.2 MG/ML
200 INJECTION INTRAVENOUS
Status: DISCONTINUED | OUTPATIENT
Start: 2022-04-20 | End: 2022-04-23 | Stop reason: HOSPADM

## 2022-04-20 RX ORDER — LIDOCAINE 40 MG/G
CREAM TOPICAL
Status: DISCONTINUED | OUTPATIENT
Start: 2022-04-20 | End: 2022-04-20 | Stop reason: HOSPADM

## 2022-04-20 RX ORDER — MORPHINE SULFATE 1 MG/ML
INJECTION, SOLUTION EPIDURAL; INTRATHECAL; INTRAVENOUS PRN
Status: DISCONTINUED | OUTPATIENT
Start: 2022-04-20 | End: 2022-04-20

## 2022-04-20 RX ORDER — ACETAMINOPHEN 325 MG/1
975 TABLET ORAL ONCE
Status: COMPLETED | OUTPATIENT
Start: 2022-04-20 | End: 2022-04-20

## 2022-04-20 RX ORDER — METOCLOPRAMIDE HYDROCHLORIDE 5 MG/ML
10 INJECTION INTRAMUSCULAR; INTRAVENOUS EVERY 6 HOURS PRN
Status: DISCONTINUED | OUTPATIENT
Start: 2022-04-20 | End: 2022-04-23 | Stop reason: HOSPADM

## 2022-04-20 RX ORDER — ONDANSETRON 2 MG/ML
4 INJECTION INTRAMUSCULAR; INTRAVENOUS EVERY 6 HOURS PRN
Status: DISCONTINUED | OUTPATIENT
Start: 2022-04-20 | End: 2022-04-20 | Stop reason: HOSPADM

## 2022-04-20 RX ORDER — SIMETHICONE 80 MG
80 TABLET,CHEWABLE ORAL 4 TIMES DAILY PRN
Status: DISCONTINUED | OUTPATIENT
Start: 2022-04-20 | End: 2022-04-23 | Stop reason: HOSPADM

## 2022-04-20 RX ORDER — DIPHENHYDRAMINE HCL 25 MG
25 CAPSULE ORAL EVERY 6 HOURS PRN
Status: DISCONTINUED | OUTPATIENT
Start: 2022-04-20 | End: 2022-04-23 | Stop reason: HOSPADM

## 2022-04-20 RX ORDER — OXYTOCIN/0.9 % SODIUM CHLORIDE 30/500 ML
PLASTIC BAG, INJECTION (ML) INTRAVENOUS CONTINUOUS PRN
Status: DISCONTINUED | OUTPATIENT
Start: 2022-04-20 | End: 2022-04-20

## 2022-04-20 RX ORDER — AMOXICILLIN 250 MG
1 CAPSULE ORAL 2 TIMES DAILY
Status: DISCONTINUED | OUTPATIENT
Start: 2022-04-20 | End: 2022-04-23 | Stop reason: HOSPADM

## 2022-04-20 RX ORDER — BUPIVACAINE HYDROCHLORIDE 2.5 MG/ML
INJECTION, SOLUTION EPIDURAL; INFILTRATION; INTRACAUDAL
Status: COMPLETED | OUTPATIENT
Start: 2022-04-20 | End: 2022-04-20

## 2022-04-20 RX ORDER — ONDANSETRON 2 MG/ML
4 INJECTION INTRAMUSCULAR; INTRAVENOUS EVERY 6 HOURS PRN
Status: DISCONTINUED | OUTPATIENT
Start: 2022-04-20 | End: 2022-04-23 | Stop reason: HOSPADM

## 2022-04-20 RX ORDER — KETOROLAC TROMETHAMINE 30 MG/ML
INJECTION, SOLUTION INTRAMUSCULAR; INTRAVENOUS PRN
Status: DISCONTINUED | OUTPATIENT
Start: 2022-04-20 | End: 2022-04-20

## 2022-04-20 RX ORDER — OXYTOCIN/0.9 % SODIUM CHLORIDE 30/500 ML
100-340 PLASTIC BAG, INJECTION (ML) INTRAVENOUS CONTINUOUS PRN
Status: DISCONTINUED | OUTPATIENT
Start: 2022-04-20 | End: 2022-04-23 | Stop reason: HOSPADM

## 2022-04-20 RX ORDER — NALOXONE HYDROCHLORIDE 0.4 MG/ML
0.4 INJECTION, SOLUTION INTRAMUSCULAR; INTRAVENOUS; SUBCUTANEOUS
Status: DISCONTINUED | OUTPATIENT
Start: 2022-04-20 | End: 2022-04-21

## 2022-04-20 RX ORDER — SODIUM CHLORIDE, SODIUM LACTATE, POTASSIUM CHLORIDE, CALCIUM CHLORIDE 600; 310; 30; 20 MG/100ML; MG/100ML; MG/100ML; MG/100ML
INJECTION, SOLUTION INTRAVENOUS CONTINUOUS
Status: DISCONTINUED | OUTPATIENT
Start: 2022-04-20 | End: 2022-04-20 | Stop reason: HOSPADM

## 2022-04-20 RX ORDER — MODIFIED LANOLIN
OINTMENT (GRAM) TOPICAL
Status: DISCONTINUED | OUTPATIENT
Start: 2022-04-20 | End: 2022-04-23 | Stop reason: HOSPADM

## 2022-04-20 RX ORDER — KETOROLAC TROMETHAMINE 30 MG/ML
30 INJECTION, SOLUTION INTRAMUSCULAR; INTRAVENOUS EVERY 6 HOURS
Status: DISCONTINUED | OUTPATIENT
Start: 2022-04-20 | End: 2022-04-21

## 2022-04-20 RX ORDER — MISOPROSTOL 200 UG/1
400 TABLET ORAL
Status: DISCONTINUED | OUTPATIENT
Start: 2022-04-20 | End: 2022-04-23 | Stop reason: HOSPADM

## 2022-04-20 RX ORDER — GLYCOPYRROLATE 0.2 MG/ML
INJECTION, SOLUTION INTRAMUSCULAR; INTRAVENOUS PRN
Status: DISCONTINUED | OUTPATIENT
Start: 2022-04-20 | End: 2022-04-20

## 2022-04-20 RX ORDER — OXYTOCIN 10 [USP'U]/ML
10 INJECTION, SOLUTION INTRAMUSCULAR; INTRAVENOUS
Status: DISCONTINUED | OUTPATIENT
Start: 2022-04-20 | End: 2022-04-20 | Stop reason: HOSPADM

## 2022-04-20 RX ORDER — CARBOPROST TROMETHAMINE 250 UG/ML
250 INJECTION, SOLUTION INTRAMUSCULAR
Status: DISCONTINUED | OUTPATIENT
Start: 2022-04-20 | End: 2022-04-20 | Stop reason: HOSPADM

## 2022-04-20 RX ORDER — METOCLOPRAMIDE 10 MG/1
10 TABLET ORAL EVERY 6 HOURS PRN
Status: DISCONTINUED | OUTPATIENT
Start: 2022-04-20 | End: 2022-04-23 | Stop reason: HOSPADM

## 2022-04-20 RX ORDER — IBUPROFEN 800 MG/1
800 TABLET, FILM COATED ORAL EVERY 6 HOURS
Status: DISCONTINUED | OUTPATIENT
Start: 2022-04-21 | End: 2022-04-21

## 2022-04-20 RX ORDER — FENTANYL CITRATE 50 UG/ML
25 INJECTION, SOLUTION INTRAMUSCULAR; INTRAVENOUS
Status: DISCONTINUED | OUTPATIENT
Start: 2022-04-20 | End: 2022-04-20

## 2022-04-20 RX ORDER — FENTANYL CITRATE-0.9 % NACL/PF 10 MCG/ML
100 PLASTIC BAG, INJECTION (ML) INTRAVENOUS EVERY 5 MIN PRN
Status: DISCONTINUED | OUTPATIENT
Start: 2022-04-20 | End: 2022-04-20 | Stop reason: HOSPADM

## 2022-04-20 RX ORDER — FENTANYL CITRATE 50 UG/ML
25 INJECTION, SOLUTION INTRAMUSCULAR; INTRAVENOUS EVERY 5 MIN PRN
Status: DISCONTINUED | OUTPATIENT
Start: 2022-04-20 | End: 2022-04-20

## 2022-04-20 RX ORDER — OXYTOCIN 10 [USP'U]/ML
10 INJECTION, SOLUTION INTRAMUSCULAR; INTRAVENOUS
Status: DISCONTINUED | OUTPATIENT
Start: 2022-04-20 | End: 2022-04-23 | Stop reason: HOSPADM

## 2022-04-20 RX ORDER — PROCHLORPERAZINE 25 MG
25 SUPPOSITORY, RECTAL RECTAL EVERY 12 HOURS PRN
Status: DISCONTINUED | OUTPATIENT
Start: 2022-04-20 | End: 2022-04-23 | Stop reason: HOSPADM

## 2022-04-20 RX ORDER — AMOXICILLIN 250 MG
2 CAPSULE ORAL 2 TIMES DAILY
Status: DISCONTINUED | OUTPATIENT
Start: 2022-04-20 | End: 2022-04-23 | Stop reason: HOSPADM

## 2022-04-20 RX ORDER — HYDROMORPHONE HCL IN WATER/PF 6 MG/30 ML
0.5 PATIENT CONTROLLED ANALGESIA SYRINGE INTRAVENOUS
Status: DISCONTINUED | OUTPATIENT
Start: 2022-04-20 | End: 2022-04-23 | Stop reason: HOSPADM

## 2022-04-20 RX ORDER — DEXTROSE, SODIUM CHLORIDE, SODIUM LACTATE, POTASSIUM CHLORIDE, AND CALCIUM CHLORIDE 5; .6; .31; .03; .02 G/100ML; G/100ML; G/100ML; G/100ML; G/100ML
INJECTION, SOLUTION INTRAVENOUS CONTINUOUS
Status: DISCONTINUED | OUTPATIENT
Start: 2022-04-20 | End: 2022-04-20

## 2022-04-20 RX ORDER — OXYTOCIN 10 [USP'U]/ML
10 INJECTION, SOLUTION INTRAMUSCULAR; INTRAVENOUS ONCE
Status: DISCONTINUED | OUTPATIENT
Start: 2022-04-20 | End: 2022-04-23 | Stop reason: HOSPADM

## 2022-04-20 RX ORDER — ONDANSETRON 4 MG/1
4 TABLET, ORALLY DISINTEGRATING ORAL EVERY 6 HOURS PRN
Status: DISCONTINUED | OUTPATIENT
Start: 2022-04-20 | End: 2022-04-20 | Stop reason: HOSPADM

## 2022-04-20 RX ORDER — MISOPROSTOL 200 UG/1
800 TABLET ORAL
Status: DISCONTINUED | OUTPATIENT
Start: 2022-04-20 | End: 2022-04-20 | Stop reason: HOSPADM

## 2022-04-20 RX ORDER — ACETAMINOPHEN 325 MG/1
975 TABLET ORAL EVERY 6 HOURS
Status: DISCONTINUED | OUTPATIENT
Start: 2022-04-20 | End: 2022-04-23 | Stop reason: HOSPADM

## 2022-04-20 RX ORDER — BISACODYL 10 MG
10 SUPPOSITORY, RECTAL RECTAL DAILY PRN
Status: DISCONTINUED | OUTPATIENT
Start: 2022-04-22 | End: 2022-04-23 | Stop reason: HOSPADM

## 2022-04-20 RX ORDER — ONDANSETRON 4 MG/1
4 TABLET, ORALLY DISINTEGRATING ORAL EVERY 6 HOURS PRN
Status: DISCONTINUED | OUTPATIENT
Start: 2022-04-20 | End: 2022-04-23 | Stop reason: HOSPADM

## 2022-04-20 RX ORDER — OXYTOCIN/0.9 % SODIUM CHLORIDE 30/500 ML
340 PLASTIC BAG, INJECTION (ML) INTRAVENOUS CONTINUOUS PRN
Status: DISCONTINUED | OUTPATIENT
Start: 2022-04-20 | End: 2022-04-23 | Stop reason: HOSPADM

## 2022-04-20 RX ORDER — CEFAZOLIN SODIUM/WATER 2 G/20 ML
2 SYRINGE (ML) INTRAVENOUS
Status: COMPLETED | OUTPATIENT
Start: 2022-04-20 | End: 2022-04-20

## 2022-04-20 RX ORDER — ONDANSETRON 2 MG/ML
INJECTION INTRAMUSCULAR; INTRAVENOUS PRN
Status: DISCONTINUED | OUTPATIENT
Start: 2022-04-20 | End: 2022-04-20

## 2022-04-20 RX ORDER — NICOTINE POLACRILEX 4 MG
15-30 LOZENGE BUCCAL
Status: DISCONTINUED | OUTPATIENT
Start: 2022-04-20 | End: 2022-04-23 | Stop reason: HOSPADM

## 2022-04-20 RX ORDER — MORPHINE SULFATE 1 MG/ML
INJECTION, SOLUTION EPIDURAL; INTRATHECAL; INTRAVENOUS
Status: COMPLETED | OUTPATIENT
Start: 2022-04-20 | End: 2022-04-20

## 2022-04-20 RX ORDER — ONDANSETRON 2 MG/ML
8 INJECTION INTRAMUSCULAR; INTRAVENOUS EVERY 8 HOURS PRN
Status: DISCONTINUED | OUTPATIENT
Start: 2022-04-20 | End: 2022-04-23 | Stop reason: HOSPADM

## 2022-04-20 RX ORDER — ONDANSETRON 2 MG/ML
4 INJECTION INTRAMUSCULAR; INTRAVENOUS EVERY 30 MIN PRN
Status: DISCONTINUED | OUTPATIENT
Start: 2022-04-20 | End: 2022-04-20

## 2022-04-20 RX ORDER — ENOXAPARIN SODIUM 100 MG/ML
40 INJECTION SUBCUTANEOUS EVERY 12 HOURS
Status: DISCONTINUED | OUTPATIENT
Start: 2022-04-21 | End: 2022-04-23 | Stop reason: HOSPADM

## 2022-04-20 RX ORDER — NALBUPHINE HYDROCHLORIDE 10 MG/ML
20 INJECTION, SOLUTION INTRAMUSCULAR; INTRAVENOUS; SUBCUTANEOUS EVERY 4 HOURS PRN
Status: DISCONTINUED | OUTPATIENT
Start: 2022-04-20 | End: 2022-04-20

## 2022-04-20 RX ORDER — MISOPROSTOL 200 UG/1
800 TABLET ORAL
Status: DISCONTINUED | OUTPATIENT
Start: 2022-04-20 | End: 2022-04-23 | Stop reason: HOSPADM

## 2022-04-20 RX ORDER — OXYTOCIN/0.9 % SODIUM CHLORIDE 30/500 ML
340 PLASTIC BAG, INJECTION (ML) INTRAVENOUS CONTINUOUS PRN
Status: DISCONTINUED | OUTPATIENT
Start: 2022-04-20 | End: 2022-04-20 | Stop reason: HOSPADM

## 2022-04-20 RX ORDER — SODIUM CHLORIDE, SODIUM LACTATE, POTASSIUM CHLORIDE, CALCIUM CHLORIDE 600; 310; 30; 20 MG/100ML; MG/100ML; MG/100ML; MG/100ML
INJECTION, SOLUTION INTRAVENOUS CONTINUOUS
Status: DISCONTINUED | OUTPATIENT
Start: 2022-04-20 | End: 2022-04-20

## 2022-04-20 RX ORDER — MISOPROSTOL 200 UG/1
400 TABLET ORAL
Status: DISCONTINUED | OUTPATIENT
Start: 2022-04-20 | End: 2022-04-20 | Stop reason: HOSPADM

## 2022-04-20 RX ORDER — NALOXONE HYDROCHLORIDE 0.4 MG/ML
0.2 INJECTION, SOLUTION INTRAMUSCULAR; INTRAVENOUS; SUBCUTANEOUS
Status: DISCONTINUED | OUTPATIENT
Start: 2022-04-20 | End: 2022-04-23 | Stop reason: HOSPADM

## 2022-04-20 RX ORDER — CEFAZOLIN SODIUM/WATER 2 G/20 ML
2 SYRINGE (ML) INTRAVENOUS SEE ADMIN INSTRUCTIONS
Status: DISCONTINUED | OUTPATIENT
Start: 2022-04-20 | End: 2022-04-20 | Stop reason: HOSPADM

## 2022-04-20 RX ORDER — LIDOCAINE 40 MG/G
CREAM TOPICAL
Status: DISCONTINUED | OUTPATIENT
Start: 2022-04-20 | End: 2022-04-23 | Stop reason: HOSPADM

## 2022-04-20 RX ORDER — HYDROMORPHONE HCL IN WATER/PF 6 MG/30 ML
0.2 PATIENT CONTROLLED ANALGESIA SYRINGE INTRAVENOUS EVERY 5 MIN PRN
Status: DISCONTINUED | OUTPATIENT
Start: 2022-04-20 | End: 2022-04-20

## 2022-04-20 RX ORDER — CITRIC ACID/SODIUM CITRATE 334-500MG
30 SOLUTION, ORAL ORAL
Status: COMPLETED | OUTPATIENT
Start: 2022-04-20 | End: 2022-04-20

## 2022-04-20 RX ORDER — SODIUM CHLORIDE, SODIUM LACTATE, POTASSIUM CHLORIDE, CALCIUM CHLORIDE 600; 310; 30; 20 MG/100ML; MG/100ML; MG/100ML; MG/100ML
INJECTION, SOLUTION INTRAVENOUS CONTINUOUS PRN
Status: DISCONTINUED | OUTPATIENT
Start: 2022-04-20 | End: 2022-04-20

## 2022-04-20 RX ADMIN — HYDROMORPHONE HYDROCHLORIDE 0.5 MG: 0.2 INJECTION, SOLUTION INTRAMUSCULAR; INTRAVENOUS; SUBCUTANEOUS at 20:59

## 2022-04-20 RX ADMIN — PANTOPRAZOLE SODIUM 40 MG: 40 TABLET, DELAYED RELEASE ORAL at 07:18

## 2022-04-20 RX ADMIN — SODIUM CHLORIDE 200 ML: 9 INJECTION, SOLUTION INTRAVENOUS at 09:27

## 2022-04-20 RX ADMIN — NALBUPHINE HYDROCHLORIDE 2.5 MG: 10 INJECTION, SOLUTION INTRAMUSCULAR; INTRAVENOUS; SUBCUTANEOUS at 22:58

## 2022-04-20 RX ADMIN — DIPHENHYDRAMINE HYDROCHLORIDE 25 MG: 50 INJECTION, SOLUTION INTRAMUSCULAR; INTRAVENOUS at 20:54

## 2022-04-20 RX ADMIN — BUPIVACAINE HYDROCHLORIDE 40 ML: 2.5 INJECTION, SOLUTION EPIDURAL; INFILTRATION; INTRACAUDAL at 16:50

## 2022-04-20 RX ADMIN — ACETAMINOPHEN 975 MG: 325 TABLET ORAL at 18:26

## 2022-04-20 RX ADMIN — OXYCODONE HYDROCHLORIDE 5 MG: 5 TABLET ORAL at 20:28

## 2022-04-20 RX ADMIN — ONDANSETRON 4 MG: 2 INJECTION INTRAMUSCULAR; INTRAVENOUS at 18:54

## 2022-04-20 RX ADMIN — SODIUM CHLORIDE, POTASSIUM CHLORIDE, SODIUM LACTATE AND CALCIUM CHLORIDE: 600; 310; 30; 20 INJECTION, SOLUTION INTRAVENOUS at 15:43

## 2022-04-20 RX ADMIN — ACETAMINOPHEN 975 MG: 325 TABLET ORAL at 11:24

## 2022-04-20 RX ADMIN — SODIUM CHLORIDE, POTASSIUM CHLORIDE, SODIUM LACTATE AND CALCIUM CHLORIDE: 600; 310; 30; 20 INJECTION, SOLUTION INTRAVENOUS at 16:52

## 2022-04-20 RX ADMIN — KETOROLAC TROMETHAMINE 15 MG: 30 INJECTION, SOLUTION INTRAMUSCULAR at 16:50

## 2022-04-20 RX ADMIN — Medication 300 ML/HR: at 16:25

## 2022-04-20 RX ADMIN — GLYCOPYRROLATE 0.2 MG: 0.2 INJECTION, SOLUTION INTRAMUSCULAR; INTRAVENOUS at 16:06

## 2022-04-20 RX ADMIN — BUPIVACAINE HYDROCHLORIDE IN DEXTROSE 1.4 ML: 7.5 INJECTION, SOLUTION SUBARACHNOID at 15:48

## 2022-04-20 RX ADMIN — SENNOSIDES AND DOCUSATE SODIUM 1 TABLET: 50; 8.6 TABLET ORAL at 20:27

## 2022-04-20 RX ADMIN — LIDOCAINE HYDROCHLORIDE 0.1 ML: 10 INJECTION, SOLUTION EPIDURAL; INFILTRATION; INTRACAUDAL; PERINEURAL at 11:48

## 2022-04-20 RX ADMIN — ONDANSETRON 4 MG: 2 INJECTION INTRAMUSCULAR; INTRAVENOUS at 20:53

## 2022-04-20 RX ADMIN — Medication 2 G: at 15:44

## 2022-04-20 RX ADMIN — TRANEXAMIC ACID 1 G: 1 INJECTION, SOLUTION INTRAVENOUS at 11:24

## 2022-04-20 RX ADMIN — METHYLERGONOVINE MALEATE 200 MCG: 0.2 INJECTION INTRAVENOUS at 16:32

## 2022-04-20 RX ADMIN — SODIUM CITRATE AND CITRIC ACID MONOHYDRATE 30 ML: 500; 334 SOLUTION ORAL at 11:24

## 2022-04-20 RX ADMIN — PHENYLEPHRINE HYDROCHLORIDE 150 MCG: 10 INJECTION INTRAVENOUS at 16:13

## 2022-04-20 RX ADMIN — PHENYLEPHRINE HYDROCHLORIDE 0.5 MCG/KG/MIN: 10 INJECTION INTRAVENOUS at 16:00

## 2022-04-20 RX ADMIN — SODIUM CHLORIDE, POTASSIUM CHLORIDE, SODIUM LACTATE AND CALCIUM CHLORIDE 200 ML/HR: 600; 310; 30; 20 INJECTION, SOLUTION INTRAVENOUS at 10:52

## 2022-04-20 RX ADMIN — ONDANSETRON 4 MG: 2 INJECTION INTRAMUSCULAR; INTRAVENOUS at 15:49

## 2022-04-20 RX ADMIN — Medication 0.15 MG: at 16:00

## 2022-04-20 RX ADMIN — MORPHINE SULFATE 0.15 MG: 1 INJECTION, SOLUTION EPIDURAL; INTRATHECAL; INTRAVENOUS at 15:48

## 2022-04-20 NOTE — PROGRESS NOTES
Dr. Lennon and Dr. Patel discussed patient status over the phone. HGB 7.4 right after 2 units of blood.    Plan for a 3rd unit to infuse over 1 hour and then a HGB check 15 minutes after infusion complete and RN to report back.

## 2022-04-20 NOTE — PROGRESS NOTES
"DIABETES CARE  Situation:  Consulted by Provider for Diabetes Education  31 year old woman with type 2 Diabetes.  She has poorly controlled type 2 DM and had elevated BP in clinic. She is 37w3d weeks pregnant. She admits to not taking her insulin for the last 3 weeks  She will be having  today    Background:  PCP: Juan David Coppola MD  Social: Has children ages 12,10, 9,8,3    Diabetes History:   History of preeclampsia, last baby in 2018   Mother has diabetes  10-8-21 stated that she was on 18 units of Lantus at hs     Meds for BG Management PTA:  At last visit to pregnancy clinic 3/16, Lantus was increased to 46 units daily   Novolog  8 units per meal    Current Inpatient Meds for BG Management:  45 units of Lantus at hs  8 units of Novolog at meals    Labs:  Hemoglobin A1C: 9.5 with a 6.6 Hgb (A1C 9.1 21)               GFR: >90   Blood Glucose POC:    Latest Reference Range & Units 22 16:20 22 18:30 22 22:55 22 06:26   GLUCOSE BY METER POCT 70 - 99 mg/dL 188 (H) 152 (H) 165 (H) 123 (H)   (H): Data is abnormally high    Diet Order:  NPO    Weight: 99.8 kg    BMI: 42.97    Assessment:    Patient was seen by endocrine in [facility] but last three visits were no show and noted in provider note she hasn't taken insulin for last 3 weeks. It looked like she was on a daily insulin shot prior pregnancy    Recommendations:  1. Basal/bolus insulin therapy after delivery  2. She will need to Follow-up with her PCP for ongoing DM management, could Follow-up with OP CDE as well as she has seen a few through this pregnancy. Could also Follow-up with endocrine clinic as well  3. I will see tomorrow    Refer to \"Guidelines for Insulin Initiation and Care in Hospitalized Adults\"  link in Diabetes Management Order set for dosing guidelines    Hospital BG goals for blood glucose levels are < 180 for improved health outcomes.    Thank you,     Tegan Crespo RN, Certified Diabetes Care and "     [facility]  [address] [place] [address]  [email or web address]   Office: [phone]  Pager: [phone]

## 2022-04-20 NOTE — PROGRESS NOTES
Fasting blood sugar 120 today hemoglobin A1c 9.1.  Hemoglobin is 6.5 today.  This patient has polyhydramnios in the setting of poorly controlled type 2 diabetes.  She admits to not taking her insulin for the last 3 weeks.  I recommended proceeding with  section today.  Have also recommended transfusing 2 units of packed red blood cells to prevent significant anemia during the surgery.Risks and benefits reviewed. All questions answered. She verbalized understanding.  She is good with this plan.  I did talk to her through a .

## 2022-04-20 NOTE — PROGRESS NOTES
Pt up to BR, voiding with no difficulty. Monitors adjusted, difficult to monitor baby very active, Poly. Abd soft w/o ctx at this time. Moderate edema noted on Bilat LE, reflexes as noted, no clonus noted. B/P as noted.  Pt asked again if baby could be born today by C/S. Pt last ate at about 10:00pm.

## 2022-04-20 NOTE — PROVIDER NOTIFICATION
04/20/22 1150   Provider Notification   Provider Name/Title dr. worthington   Method of Notification Phone   Request Evaluate - Remote   Notification Reason Lab/Diagnostic Study     Would like the 2nd unit of blood completely transfused and then a stat CBC with platlets, INR, and PTT before we go back    Addition: MDA stats okay to run rate at 999 to get the 2nd unit in

## 2022-04-20 NOTE — PROGRESS NOTES
Dr Saldana on unit, discuss plan of care for patient during night. Ok to give night time insulin (45 unit(s) Lantus at bedtime. Notified MD that IV Iron administered, GBS collected.

## 2022-04-20 NOTE — PLAN OF CARE
Problem: Plan of Care - These are the overarching goals to be used throughout the patient stay.    Goal: Optimal Comfort and Wellbeing  Outcome: Ongoing, Progressing     Problem: Diabetes in Pregnancy  Goal: Blood Glucose Level Within Targeted Range  Outcome: Ongoing, Progressing

## 2022-04-20 NOTE — ANESTHESIA PREPROCEDURE EVALUATION
Anesthesia Pre-Procedure Evaluation    Patient: Carlos Oliveros   MRN: [id number] : 1990        Procedure : Procedure(s):   SECTION          Past Medical History:   Diagnosis Date     Diabetes mellitus (H)     IDGDM vs Type 2     Hypertension     gestational hypertension      Past Surgical History:   Procedure Laterality Date      SECTION N/A 2018    Procedure:  SECTION;  Surgeon: Patricia Velazquez MD;  Location: [facility];  Service:       Allergies   Allergen Reactions     No Known Drug Allergies Unknown      Social History     Tobacco Use     Smoking status: Never Smoker     Smokeless tobacco: Never Used   Substance Use Topics     Alcohol use: No      Wt Readings from Last 1 Encounters:   22 99.8 kg (220 lb)        Anesthesia Evaluation   Pt has had prior anesthetic.     No history of anesthetic complications       ROS/MED HX  ENT/Pulmonary:  - neg pulmonary ROS     Neurologic:  - neg neurologic ROS     Cardiovascular:  - neg cardiovascular ROS   (+) hypertension-----    METS/Exercise Tolerance:     Hematologic: Comments: S/p 3x RBC transfusion    (+) no anticoagulation therapy, no coagulopathy, anemia,     Musculoskeletal:       GI/Hepatic:  - neg GI/hepatic ROS     Renal/Genitourinary:       Endo:     (+) type II DM, Obesity,     Psychiatric/Substance Use:  - neg psychiatric ROS     Infectious Disease:       Malignancy:       Other:   Polyhydramnios   (+) Possibly pregnant, , previous ,  (-) TOLAC candidate       Physical Exam    Airway        Mallampati: II   TM distance: > 3 FB   Neck ROM: full   Mouth opening: > 3 cm    Respiratory Devices and Support         Dental  no notable dental history         Cardiovascular   cardiovascular exam normal          Pulmonary   pulmonary exam normal                OUTSIDE LABS:  CBC:   Lab Results   Component Value Date    WBC 10.8 2022    WBC 12.1 (H) 2022    HGB 6.5 (LL) 2022    HGB 6.6 (LL)  04/19/2022    HCT 24.1 (L) 04/20/2022    HCT 24.7 (L) 04/19/2022     04/20/2022     04/19/2022     BMP:   Lab Results   Component Value Date     (L) 10/08/2021     (L) 01/16/2020    POTASSIUM 4.4 10/08/2021    POTASSIUM 4.6 01/16/2020    CHLORIDE 100 10/08/2021    CHLORIDE 100 01/16/2020    CO2 20 (L) 10/08/2021    CO2 23 01/16/2020    BUN 7 (L) 10/08/2021    BUN 10 01/16/2020    CR 0.52 (L) 04/19/2022    CR 0.71 10/08/2021     (H) 04/20/2022     (H) 04/19/2022     COAGS:   Lab Results   Component Value Date    PTT 29 04/19/2022    INR 0.97 04/19/2022     POC:   Lab Results   Component Value Date    HCG Positive (A) 10/08/2021     HEPATIC:   Lab Results   Component Value Date    ALBUMIN 3.9 10/08/2021    PROTTOTAL 8.2 (H) 10/08/2021    ALT 18 04/19/2022    AST 35 04/19/2022    ALKPHOS 62 10/08/2021    BILITOTAL 0.7 10/08/2021     OTHER:   Lab Results   Component Value Date    A1C 9.5 (H) 04/19/2022    JOLIE 10.1 10/08/2021    MAG 1.8 06/28/2018       Anesthesia Plan    ASA Status:  3      Anesthesia Type: Epidural.              Consents    Anesthesia Plan(s) and associated risks, benefits, and realistic alternatives discussed. Questions answered and patient/representative(s) expressed understanding.    - Discussed:     - Discussed with:  Patient         Postoperative Care            Comments:    Other Comments: Plt 323    Risk and benefits of TAP blocks discussed with patient. All patient questions answered and patient agreeable to receiving blocks in OR for postoperative pain.            Argenis Lennon MD

## 2022-04-20 NOTE — ANESTHESIA PROCEDURE NOTES
Intrathecal Procedure Note    Pre-Procedure   Staff -        Anesthesiologist:  Humberto Dejesus MD       Performed By: anesthesiologist       Location: OR       Procedure Start/Stop Times: 4/20/2022 3:48 PM and 4/20/2022 4:00 PM       Pre-Anesthestic Checklist: patient identified, IV checked, risks and benefits discussed, informed consent, monitors and equipment checked, pre-op evaluation and at physician/surgeon's request  Timeout:       Correct Patient: Yes        Correct Procedure: Yes        Correct Site: Yes        Correct Position: Yes   Procedure Documentation  Procedure: intrathecal       Patient Position: sitting       Skin prep: Chloraprep       Insertion Site: L3-4. (midline approach).       Needle Gauge: 24.        Needle Length (Inches): 4        Spinal Needle Type: Pencan       Introducer used       Introducer: 20 G       # of attempts: 3 and  # of redirects:  3    Assessment/Narrative         Paresthesias: No.       CSF fluid: clear.    Medication(s) Administered   0.75% Hyperbaric Bupivacaine (Intrathecal) - Intrathecal   1.4 mL - 4/20/2022 3:48:00 PM  Morphine PF 1 mg/mL (Intrathecal) - Intrathecal   0.15 mg - 4/20/2022 3:48:00 PM  Medication Administration Time: 4/20/2022 3:48 PM

## 2022-04-20 NOTE — PLAN OF CARE
Problem: Diabetes in Pregnancy  Goal: Blood Glucose Level Within Targeted Range  2022 1828 by Flory Hoff, RN  Outcome: Ongoing, Progressing  2022 0855 by Flory Hoff, RN  Outcome: Ongoing, Progressing     Blood sugar after the surgery was appropriate      Problem: Postoperative Urinary Retention (Postpartum  Delivery)  Goal: Effective Urinary Elimination  Outcome: Ongoing, Progressing     Martin is still in with adequate      Problem: Pain (Postpartum  Delivery)  Goal: Acceptable Pain Control  Outcome: Ongoing, Progressing     Eh is coping with the pain, tylenol given      Problem: Bleeding (Postpartum  Delivery)  Goal: Hemostasis  Outcome: Ongoing, Progressing     Acceptable bleeding

## 2022-04-20 NOTE — PROGRESS NOTES
Report received from BRISA, discuss plan of care via josefina Haley agreeable. Pt will call when ready to eat for insulin. GBS collected, Iron Sucrose (Venofer) administered via IV. VS as noted, monitors adjusted, baby very active difficult to monitor at this time.

## 2022-04-20 NOTE — ANESTHESIA PROCEDURE NOTES
TAP Procedure Note    Pre-Procedure   Staff -        Anesthesiologist:  Humberto Dejesus MD       Performed By: anesthesiologist       Location: OR       Procedure Start/Stop Times: 4/20/2022 4:50 PM and 4/20/2022 5:00 PM       Pre-Anesthestic Checklist: patient identified, IV checked, site marked, risks and benefits discussed, informed consent, monitors and equipment checked, pre-op evaluation, at physician/surgeon's request and post-op pain management  Timeout:       Correct Patient: Yes        Correct Procedure: Yes        Correct Site: Yes        Correct Position: Yes        Correct Laterality: Yes        Site Marked: Yes  Procedure Documentation  Procedure: TAP       Diagnosis: POST OP PAIN CONTROL       Laterality: bilateral       Patient Position: supine       Patient Prep/Sterile Barriers: sterile gloves, mask       Skin prep: Chloraprep       Needle Type: short bevel       Needle Gauge: 20.        Needle Length (Inches): 4        Ultrasound guided       1. Ultrasound was used to identify targeted nerve, plexus, vascular marker, or fascial plane and place a needle adjacent to it in real-time.       2. Ultrasound was used to visualize the spread of anesthetic in close proximity to the above referenced structure.       3. A permanent image is entered into the patient's record.       4. The visualized anatomic structures appeared normal.       5. There were no apparent abnormal pathologic findings.    Assessment/Narrative         The placement was negative for: blood aspirated, painful injection and site bleeding       Paresthesias: No.       Bolus given via needle. no blood aspirated via catheter.        Secured via.        Insertion/Infusion Method: Single Shot       Complications: none       Injection made incrementally with aspirations every 5 mL.    Medication(s) Administered   Bupivacaine 0.25% PF (Infiltration) - Infiltration   40 mL - 4/20/2022 4:50:00 PM  Medication Administration Time: 4/20/2022 4:50  PM

## 2022-04-20 NOTE — ANESTHESIA CARE TRANSFER NOTE
Patient: Carlos Oliveros    Procedure: Procedure(s):   SECTION       Diagnosis: Other (see Comments)  Diagnosis Additional Information: No value filed.    Anesthesia Type:   Epidural     Note:    Oropharynx: oropharynx clear of all foreign objects and spontaneously breathing  Level of Consciousness: awake  Oxygen Supplementation: room air    Independent Airway: airway patency satisfactory and stable  Dentition: dentition unchanged  Vital Signs Stable: post-procedure vital signs reviewed and stable  Report to RN Given: handoff report given  Patient transferred to: Labor and Delivery    Handoff Report: Identifed the Patient, Identified the Reponsible Provider, Reviewed the pertinent medical history, Discussed the surgical course, Reviewed Intra-OP anesthesia mangement and issues during anesthesia, Set expectations for post-procedure period and Allowed opportunity for questions and acknowledgement of understanding      Vitals:  Vitals Value Taken Time   /69 22 1718   Temp 37  C (98.6  F) 22 1718   Pulse 64 22 1718   Resp 16 22 1718   SpO2 96 % 22       Electronically Signed By: NICOLLE Granado CRNA  2022  5:18 PM

## 2022-04-20 NOTE — PROGRESS NOTES
Dr Saldana notified of Glucose orders for 2 hour post prandial, ok to check PCX at bedtime or 2 hour whichever comes first. Administer Lantus as ordered tonight at bedtime. Obtain fasting Glucose in morning.

## 2022-04-20 NOTE — PLAN OF CARE
Problem: Change in Fetal Wellbeing ( Delivery)  Goal: Stable Fetal Wellbeing  Outcome: Ongoing, Progressing     EFM is mostly minimal with some moderate, no decels. MD aware      Problem: Respiratory Compromise ( Delivery)  Goal: Effective Oxygenation and Ventilation  Outcome: Ongoing, Progressing     Carlos Oliveros is being monitored for her low HGB, blood transfusion is ordered      Problem: Diabetes in Pregnancy  Goal: Blood Glucose Level Within Targeted Range  Outcome: Ongoing, Progressing     Fasting blood sugar was 125 - no more monitoring until after surgery. She is NPO so will not get insulin     MD was bedside and ordered 1 unit for a blood transfusion. If the morning HBG is under 7 he wants the 2nd unit run as well. MD and Carlos Oliveros signed the consents for blood transfusion and surgery consent.    RN updated NNP on the expected delivery at 1130.

## 2022-04-20 NOTE — OP NOTE
NAME:  Carlos Oliveros     DATE OF SERVICE: 2022     PREOPERATIVE DIAGNOSIS: 37 weeks, poorly controlled type 2 diabetes, polyhydramnios, prior  section    POSTOPERATIVE DIAGNOSIS: Same    PROCEDURE: Repeat Low transverse  section      SURGEON:  Arjun Patel MD     ASSISTANT: Shelby    ANESTHESIA: spinal    QBL Delivery QBL (mL): 395    DRAINS: Martin catheter.    COMPLICATIONS: None    FINDINGS: Normal uterus, tubes and ovaries bilateral. Normal appearance to the adnexae.  Live female infant born with Apgars of 7 at one minute, 8 at 5 minutes,  weight unavailable at time of dictation.    PROCEDURE:  Patient was met preoperatively where we discussed the procedure and the risks associated with the procedure.  She understood these to include but not limited to injury to adjacent organs including bowel, bladder, ureter, infection and bleeding. Understanding these risks her consents were signed.      She was brought to the operating room in stable condition.  After induction of a spinal anesthetic, fetal heart tones were checked and were stable. She was carefully prepped and draped in sterile fashion for the procedure.  A timeout was then performed.      A Pfannenstiel skin incision was made and carried down to the rectus fascia which was incised in the midline and carried out bilaterally.  The superior and inferior aspects of the rectus fascia were elevated up and the underlying rectus muscles dissected off with sharp and blunt techniques.  The rectus muscles were now  at the midline and the peritoneum was identified and entered sharply.  This incision was then extended.  A bladder blade was introduced.  A low transverse uterine incision was made revealing Copious amounts of clear amniotic fluid.  The total amount drained was 5300 cc.  The breech came right to the incision and it was delivered followed by the legs in the body up to the nipple line.  The arms were delivered  sequentially.  The head was delivered maintaining flexion at the neck.  The baby was crying but the cord was clamped x2 and cut and the baby was handed to the pediatric team in attendance due to the large size of the baby and history of poorly controlled diabetes.    The placenta was then manually removed from the uterus.  The uterus was exteriorized, covered with a moist laparotomy sponge and cleared of all clots and debris.  The uterine incision was now closed with 0 Vicryl from both angles in a running locking suture.  This incision included the bladder flap.  1 extra stitch was required at the right edge for adequate hemostasis. The uterus was returned to the abdominopelvic cavity.  The pericolic gutters were cleared of all clots and debris.  The uterus was again inspected and noted to be hemostatic. The fascia was brought together with looped O PDS. The subcutaneous tissues were irrigated, made hemostatic with use of electrocautery and 3-0 chromic.  Skin was closed with 4-0 Monocryl.  Patient tolerated this procedure well.  Sponge, lap and needle counts were correct x two.      Arjun Patel MD

## 2022-04-21 PROBLEM — E11.9 TYPE 2 DIABETES MELLITUS WITHOUT COMPLICATION, WITHOUT LONG-TERM CURRENT USE OF INSULIN (H): Status: ACTIVE | Noted: 2017-06-29

## 2022-04-21 PROBLEM — E83.42 HYPOMAGNESEMIA: Status: ACTIVE | Noted: 2022-04-21

## 2022-04-21 PROBLEM — E87.6 HYPOKALEMIA: Status: ACTIVE | Noted: 2022-04-21

## 2022-04-21 PROBLEM — D50.8 IRON DEFICIENCY ANEMIA SECONDARY TO INADEQUATE DIETARY IRON INTAKE: Status: ACTIVE | Noted: 2022-04-21

## 2022-04-21 PROBLEM — Z22.7 LTBI (LATENT TUBERCULOSIS INFECTION): Status: ACTIVE | Noted: 2022-04-21

## 2022-04-21 LAB
ALBUMIN SERPL-MCNC: 1.9 G/DL (ref 3.5–5)
ALP SERPL-CCNC: 75 U/L (ref 45–120)
ALT SERPL W P-5'-P-CCNC: 16 U/L (ref 0–45)
ANION GAP SERPL CALCULATED.3IONS-SCNC: 7 MMOL/L (ref 5–18)
AST SERPL W P-5'-P-CCNC: 32 U/L (ref 0–40)
BILIRUB SERPL-MCNC: 0.8 MG/DL (ref 0–1)
BUN SERPL-MCNC: 5 MG/DL (ref 8–22)
CALCIUM SERPL-MCNC: 8.2 MG/DL (ref 8.5–10.5)
CHLORIDE BLD-SCNC: 104 MMOL/L (ref 98–107)
CO2 SERPL-SCNC: 22 MMOL/L (ref 22–31)
CREAT SERPL-MCNC: 0.48 MG/DL (ref 0.6–1.1)
ELLIPTOCYTES BLD QL SMEAR: SLIGHT
ERYTHROCYTE [DISTWIDTH] IN BLOOD BY AUTOMATED COUNT: 33.3 % (ref 10–15)
GFR SERPL CREATININE-BSD FRML MDRD: >90 ML/MIN/1.73M2
GLUCOSE BLD-MCNC: 110 MG/DL (ref 70–125)
GLUCOSE BLDC GLUCOMTR-MCNC: 106 MG/DL (ref 70–99)
GLUCOSE BLDC GLUCOMTR-MCNC: 134 MG/DL (ref 70–99)
GLUCOSE BLDC GLUCOMTR-MCNC: 170 MG/DL (ref 70–99)
GLUCOSE BLDC GLUCOMTR-MCNC: 237 MG/DL (ref 70–99)
HCT VFR BLD AUTO: 26.5 % (ref 35–47)
HGB BLD-MCNC: 7.7 G/DL (ref 11.7–15.7)
INR PPP: 1.03 (ref 0.85–1.15)
MAGNESIUM SERPL-MCNC: 1.5 MG/DL (ref 1.8–2.6)
MCH RBC QN AUTO: 15.9 PG (ref 26.5–33)
MCHC RBC AUTO-ENTMCNC: 29.1 G/DL (ref 31.5–36.5)
MCV RBC AUTO: 55 FL (ref 78–100)
PLAT MORPH BLD: ABNORMAL
PLATELET # BLD AUTO: 289 10E3/UL (ref 150–450)
POLYCHROMASIA BLD QL SMEAR: SLIGHT
POTASSIUM BLD-SCNC: 3.1 MMOL/L (ref 3.5–5)
PROT SERPL-MCNC: 5.3 G/DL (ref 6–8)
RBC # BLD AUTO: 4.85 10E6/UL (ref 3.8–5.2)
RBC MORPH BLD: ABNORMAL
SODIUM SERPL-SCNC: 133 MMOL/L (ref 136–145)
SPHEROCYTES BLD QL SMEAR: SLIGHT
TARGETS BLD QL SMEAR: SLIGHT
WBC # BLD AUTO: 14 10E3/UL (ref 4–11)

## 2022-04-21 PROCEDURE — 80053 COMPREHEN METABOLIC PANEL: CPT | Performed by: FAMILY MEDICINE

## 2022-04-21 PROCEDURE — 250N000013 HC RX MED GY IP 250 OP 250 PS 637: Performed by: FAMILY MEDICINE

## 2022-04-21 PROCEDURE — 36415 COLL VENOUS BLD VENIPUNCTURE: CPT | Performed by: FAMILY MEDICINE

## 2022-04-21 PROCEDURE — 120N000001 HC R&B MED SURG/OB

## 2022-04-21 PROCEDURE — 85610 PROTHROMBIN TIME: CPT | Performed by: FAMILY MEDICINE

## 2022-04-21 PROCEDURE — 99233 SBSQ HOSP IP/OBS HIGH 50: CPT | Performed by: HOSPITALIST

## 2022-04-21 PROCEDURE — 83735 ASSAY OF MAGNESIUM: CPT | Performed by: FAMILY MEDICINE

## 2022-04-21 PROCEDURE — 250N000013 HC RX MED GY IP 250 OP 250 PS 637: Performed by: HOSPITALIST

## 2022-04-21 PROCEDURE — 250N000012 HC RX MED GY IP 250 OP 636 PS 637: Performed by: FAMILY MEDICINE

## 2022-04-21 PROCEDURE — 250N000011 HC RX IP 250 OP 636: Performed by: OBSTETRICS & GYNECOLOGY

## 2022-04-21 PROCEDURE — 250N000013 HC RX MED GY IP 250 OP 250 PS 637: Performed by: OBSTETRICS & GYNECOLOGY

## 2022-04-21 PROCEDURE — 85660 RBC SICKLE CELL TEST: CPT | Performed by: OBSTETRICS & GYNECOLOGY

## 2022-04-21 PROCEDURE — 258N000003 HC RX IP 258 OP 636: Performed by: OBSTETRICS & GYNECOLOGY

## 2022-04-21 PROCEDURE — 85027 COMPLETE CBC AUTOMATED: CPT | Performed by: FAMILY MEDICINE

## 2022-04-21 RX ORDER — PANTOPRAZOLE SODIUM 40 MG/1
40 TABLET, DELAYED RELEASE ORAL
Status: DISCONTINUED | OUTPATIENT
Start: 2022-04-21 | End: 2022-04-21

## 2022-04-21 RX ORDER — POTASSIUM CHLORIDE 1500 MG/1
20 TABLET, EXTENDED RELEASE ORAL DAILY
Status: DISCONTINUED | OUTPATIENT
Start: 2022-04-21 | End: 2022-04-23 | Stop reason: HOSPADM

## 2022-04-21 RX ORDER — PANTOPRAZOLE SODIUM 40 MG/1
40 TABLET, DELAYED RELEASE ORAL
Status: DISCONTINUED | OUTPATIENT
Start: 2022-04-21 | End: 2022-04-23 | Stop reason: HOSPADM

## 2022-04-21 RX ADMIN — SODIUM CHLORIDE, POTASSIUM CHLORIDE, SODIUM LACTATE AND CALCIUM CHLORIDE: 600; 310; 30; 20 INJECTION, SOLUTION INTRAVENOUS at 12:37

## 2022-04-21 RX ADMIN — HYDROMORPHONE HYDROCHLORIDE 0.5 MG: 0.2 INJECTION, SOLUTION INTRAMUSCULAR; INTRAVENOUS; SUBCUTANEOUS at 00:08

## 2022-04-21 RX ADMIN — KETOROLAC TROMETHAMINE 30 MG: 30 INJECTION, SOLUTION INTRAMUSCULAR at 00:08

## 2022-04-21 RX ADMIN — MAGNESIUM 64 MG (MAGNESIUM CHLORIDE) TABLET,DELAYED RELEASE 535 MG: at 12:45

## 2022-04-21 RX ADMIN — ENOXAPARIN SODIUM 40 MG: 40 INJECTION SUBCUTANEOUS at 16:42

## 2022-04-21 RX ADMIN — SENNOSIDES AND DOCUSATE SODIUM 1 TABLET: 50; 8.6 TABLET ORAL at 08:01

## 2022-04-21 RX ADMIN — OXYCODONE HYDROCHLORIDE 5 MG: 5 TABLET ORAL at 18:38

## 2022-04-21 RX ADMIN — ACETAMINOPHEN 975 MG: 325 TABLET ORAL at 14:39

## 2022-04-21 RX ADMIN — SENNOSIDES AND DOCUSATE SODIUM 1 TABLET: 50; 8.6 TABLET ORAL at 21:00

## 2022-04-21 RX ADMIN — INSULIN ASPART 1 UNITS: 100 INJECTION, SOLUTION INTRAVENOUS; SUBCUTANEOUS at 18:38

## 2022-04-21 RX ADMIN — POTASSIUM CHLORIDE 20 MEQ: 1500 TABLET, EXTENDED RELEASE ORAL at 12:44

## 2022-04-21 RX ADMIN — SODIUM CHLORIDE, POTASSIUM CHLORIDE, SODIUM LACTATE AND CALCIUM CHLORIDE: 600; 310; 30; 20 INJECTION, SOLUTION INTRAVENOUS at 02:00

## 2022-04-21 RX ADMIN — HYDROMORPHONE HYDROCHLORIDE 0.5 MG: 0.2 INJECTION, SOLUTION INTRAMUSCULAR; INTRAVENOUS; SUBCUTANEOUS at 05:31

## 2022-04-21 RX ADMIN — OXYCODONE HYDROCHLORIDE 5 MG: 5 TABLET ORAL at 14:39

## 2022-04-21 RX ADMIN — PANTOPRAZOLE SODIUM 40 MG: 40 TABLET, DELAYED RELEASE ORAL at 08:03

## 2022-04-21 RX ADMIN — ONDANSETRON 8 MG: 2 INJECTION INTRAMUSCULAR; INTRAVENOUS at 05:25

## 2022-04-21 RX ADMIN — ACETAMINOPHEN 975 MG: 325 TABLET ORAL at 21:00

## 2022-04-21 RX ADMIN — OXYCODONE HYDROCHLORIDE 5 MG: 5 TABLET ORAL at 10:22

## 2022-04-21 RX ADMIN — ACETAMINOPHEN 975 MG: 325 TABLET ORAL at 08:03

## 2022-04-21 NOTE — PROGRESS NOTES
Assumed care of pt after receiving report from CAMI Ruth  Via iPAD , assess pt. Pt states she is still dizzy, nauseated and pain to abd with touch or movement. Stated that she did not want to talk due to these things. Did allow RN to assess.   Repositioned pt and lowered HOB to decrease dizziness. Restarted LR at 100ml/hr for hydratinon d/t being dizzy and urine concentrated. Emesis bag given. Itching present Bps remain 140's/80's and temp 99.6 axillary. Dressing with scant drainage that was marked at this time. SCDs remain in place. IV to left forearm infusing and is patent. Right IV is capped. Will continue to monitor.Brittany Swanson RN

## 2022-04-21 NOTE — CONSULTS
"Diabetes Care:  Reviewed with patient following information:    Reviewed the treatment plan for BG monitoring, insulin use, meal plan. She has a meter/supplies at home. She also has Lantus insulin pens in the refrigerator. She states she took a big pill too, hard to swallow. I stated as long as it was not an extended release, ER after name, she could cut it in half if needed.  I asked her why she didn't take her insulin for a couple weeks prior coming here and she states her abdomen where she gave her shots got so tight. I educated her on other areas she can also give shots: thighs, back of arm, top of buttocks.     She has a meter and states when she would check in the am it would be 110, 120.     Reviewed the goals, frequency of testing. I suggested she check first thing in am and before evening meal and 2 hour after the evening meal. I gave her a sheet on this. I talked to her about the A1C test.         9.5 with a 6.6 Hgb now (A1C 9.1 12/1/21)      Reviewed Lantus use daily, reviewing storage and site rotation.     Reviewed hypoglycemia: sx, causes, treatment. She states it did happen occ in am or at night. I stated if that was the times of lows, it may mean the Lantus dose has to be decreased and she should talk to her doctor.     Reviewed the meal plan, consistent rice (about a \"fistful\", small fruit, milk or yogurt)    I told her once she is home and with her 6 children, she will be very busy and of course activity helps BG as well.    I don't know what medication she will be discharged on, perhaps only Metformin will be necessary.     Patient encouraged to Follow-up with an OP diabetes educator, asking for referral from PCP. Patient saw 3 different CDEs while pregnant as well as NP in the endo clinic. She plans to Follow-up with her PCP.     Thanks    Tegan Crespo RN, Certified Diabetes Care and     [facility]  [address] [place], [place] " [id number]  [email or web address]   Office: [phone]  Pager: [phone]

## 2022-04-21 NOTE — PLAN OF CARE
Problem: Diabetes in Pregnancy  Goal: Blood Glucose Level Within Targeted Range  Outcome: Ongoing, Progressing     Problem: Adjustment to Role Transition (Postpartum  Delivery)  Goal: Successful Maternal Role Transition  Outcome: Ongoing, Progressing  Intervention: Support Maternal Role Transition  Recent Flowsheet Documentation  Taken 2022 by Deanna Draper RN  Supportive Measures: active listening utilized     Problem: Infection (Postpartum  Delivery)  Goal: Absence of Infection Signs and Symptoms  Outcome: Ongoing, Progressing     Problem: Pain (Postpartum  Delivery)  Goal: Acceptable Pain Control  Outcome: Ongoing, Progressing  Intervention: Prevent or Manage Pain  Recent Flowsheet Documentation  Taken 2022 by Deanna Draper RN  Pain Management Interventions:   medication (see MAR)   emotional support        Pt vs are WNL.  Declined Dilaudid this time. States that she is afraid she is going to throw up again.  Explained to pt that she has received Zofran and should not be feeling as nauseous. Emesis bag at bedside. Drinking warm water. Will attempt to get pt to stand and move a bit closer to morning, perhaps to wheelchair to see baby in NICU. Due to low Hgb and pt having nausea and dizziness this has been declined thus far.  Will continue to assess progress.

## 2022-04-21 NOTE — PROGRESS NOTES
Pt got up and walked few steps to wheelchair and then went to NICU to see baby. Said she was not in pain. Moved well.  She stated that she did not feel nauseous anymore but was feeling dizzy. Decision then made to return to room. Will leave watson catheter in until able to independently walk to BR or walk with assistance without dizziness. Urine output just 500 overnight. Still very dark. Will continue with IV fluids per Dr Lacy.

## 2022-04-21 NOTE — PROGRESS NOTES
Pt given senna and Oxycodone per PRN orders after pt stated she stated nausea was a little better. Vomited immed after.   Notified MD of pt status re: dizziness remaining, nausea that was treated with Zofran 4mg IV and now waiting for compazine from pharmacy, now vomiting up senna and oxycodone that was just given as well as itching. Informed of concentrated urine and restarted LR at LR at 100ml/hr but need a current order for fluids. MD in agreement with current rate. MD gave orders to increase Zofran to 8mg IV q8hrs prn, Dilaudid 0.5mg IV q2hrs prn, and will place orders for Nubain and Benadryl for itching. Also orders to leave awtson catheter in until morning. .Brittany Swanson RN

## 2022-04-21 NOTE — PROGRESS NOTES
"Dr. masterson called and asked RN to ask pt the following questions.   *does she have heartburn  *does she have red or black stool  *has she vomited blood  *any bleeding from anywhere  *does she have ulcers  *has she ever been scoped    Pt reports \"no\" to all these questions. Dr. Masterson notified via phone and states he will have GI see her tomorrow  .Brittany Swanson RN    "

## 2022-04-21 NOTE — PROGRESS NOTES
"Pt is resting comfortably. She states that she is \"getting better\" Dilaudid and Toradol given. Denies nausea at this time. Will continue to closely monitor.    This note written by Deanna Draper RN. Computer didn't sign Brittany off.  "

## 2022-04-21 NOTE — CONSULTS
[facility]  Consult Note - Hospitalist Service     Date of Admission:  2022  Consult Requested by: Dr. Cantu  Reason for Consult: Diabetes    Active Problems:    Encounter for triage in pregnant patient    Type 2 diabetes mellitus complicating pregnancy in third trimester, antepartum    Assessment & Plan    Giselle Oliveros is a 31 year old female with a past history of diabetes, positive TB screen, anemia admitted on 2022 for delivery.  Hospital medicine has been consulted to for diabetes mellitus.  Patient also was found to be significantly anemic on admission    Diabetes-  Originally carry the diagnosis of gestational diabetes with a previous pregnancy.  However was seen by her primary physician on 2020 and was found to have an A1c of 10.9.  She was started on metformin but apparently did not take this.  Her last pregnancy was in 2018.  Patient is supposed to be taking Lantus and NovoLog but she has not been taking this for several weeks as she does not like to give herself shots.  Her A1c was 9.5 on admission.  Patient is now status post  today.  She is supposed to be giving herself during her pregnancy 40 units of Lantus at bedtime, 3 units of NovoLog Premeal and a sliding scale  She last received 45 units of NPH yesterday evening, her blood sugars today status post  are very well controlled in the low 100s  Patient no longer may require insulin now she is status postdelivery  Still highly likely diabetic given her previously increased A1c's  Given her normal blood sugars we will hold on further Lantus, restart medium intensity sliding scale 4 times a day  Depending on her blood sugar trend may have to start Lantus in the future  Tomorrow when she is eating more consider metformin and glipizide, will have diabetes educator see    Severe anemia  Patient on admission had a hemoglobin of 6.6 with MCV of 49.  She had a low ferritin of 4.  She denies any abdominal  pain, melena, hematochezia, GERD symptoms.  She has a history of chronic anemia but no recent hemoglobin checks  She was given iron sucrose yesterday  No signs of GI bleed, will ask GI to see.  No vaginal bleeding.  Probably merits endoscopy at some point.  Start PPI  Discontinue NSAIDs    Past history of positive tuberculosis test-  Patient is supposed to follow-up for what I believe is INH therapy.  She should follow-up with her primary physician as an outpatient.  No TB symptoms.  Check LFTs    Status post  at 37 weeks postop day 0  This was done due to lack of significant prenatal care follow-up, polyhydramnios, prior  and poorly controlled diabetes.  OB/GYN managing.  Baby is in the NICU.         The patient's care was discussed with the Bedside Nurse, Patient and Patient's Family.    REGIS MALONEY MD  [facility]  Securely message with the Vocera Web Console (learn more here)  Text page via [employer] Paging/Directory      Clinically Significant Risk Factors Present on Admission             # Severe Obesity: Estimated body mass index is 42.97 kg/m  as calculated from the following:    Height as of this encounter: 1.524 m (5').    Weight as of this encounter: 99.8 kg (220 lb).        Discussed care with patient, family, staff for total time 55 minutes with greater than 50% of total time spent in counseling and coordination of care.  ______________________________________________________________________    Chief Complaint   Diabetes, anemia    History is obtained from the patient, electronic health record and paper chart    History of Present Illness   Carlos Oliveros is a 31 year old female who is just status post  today.  This is her 6 delivery, she has had a previous .  She has a past history of gestational diabetes currently on insulin however has not been taking her Lantus, likely chronic diabetes based on A1c a year ago, anemia with hemoglobin of 6.6 on  admission yesterday, iron deficiency, history of positive TB test,.  Patient has not been taking her Lantus as she does not like giving herself shots.  She was supposed to be on metformin a year ago for an elevated A1c of 10 but has not been taking that medication either.  Her last delivery was in 2018 when she was on insulin for gestational diabetes.  She denies any melena, hematochezia, abdominal pain, vaginal bleeding or discharge, hematuria, nausea or vomiting.  No chills or fever or abnormal weight loss or weight gain    Review of Systems   The 10 point Review of Systems is negative other than noted in the HPI or here.     Past Medical History    I have reviewed this patient's medical history and updated it with pertinent information if needed.   Past Medical History:   Diagnosis Date     Diabetes mellitus (H)     IDGDM vs Type 2     Hypertension     gestational hypertension       Past Surgical History   I have reviewed this patient's surgical history and updated it with pertinent information if needed.  Past Surgical History:   Procedure Laterality Date      SECTION N/A 2018    Procedure:  SECTION;  Surgeon: Patricia Velazquez MD;  Location: [facility] OR;  Service:        Social History   I have reviewed this patient's social history and updated it with pertinent information if needed.  Social History     Tobacco Use     Smoking status: Never Smoker     Smokeless tobacco: Never Used   Substance Use Topics     Alcohol use: No     Drug use: No       Family History     No significant family history, including no history of: Gastric ulcers    Medications   I have reviewed this patient's current medications    Allergies   Allergies   Allergen Reactions     No Known Drug Allergies Unknown       Physical Exam   Vital Signs: Temp: 99.2  F (37.3  C) Temp src: Axillary BP: 134/71 Pulse: 63   Resp: 18 SpO2: 99 %      Weight: 220 lbs 0 oz    Physical Exam:  Temp:  [98.3  F (36.8  C)-99.6  F  (37.6  C)] 99.2  F (37.3  C)  Pulse:  [61-86] 63  Resp:  [16-18] 18  BP: (113-147)/(55-92) 134/71  SpO2:  [92 %-100 %] 99 %    /71   Pulse 63   Temp 99.2  F (37.3  C) (Axillary)   Resp 18   Ht 1.524 m (5')   Wt 99.8 kg (220 lb)   LMP 07/28/2021 (Exact Date)   SpO2 99%   Breastfeeding Unknown   BMI 42.97 kg/m    General appearance: alert, appears stated age and cooperative  Head: Normocephalic, without obvious abnormality, atraumatic  Eyes: Clear conjuctiva  Neck: no JVD and supple, symmetrical, trachea midline  Lungs: clear to auscultation bilaterally  Heart: regular rate and rhythm, S1, S2 normal, no murmur, click, rub or gallop  Abdomen: soft, non-tender; bowel sounds normal; no masses,  no organomegaly and Bandaged lower abdominal incision  Extremities: Roberto's sign is negative, no sign of DVT  Skin: Skin color, texture, turgor normal. No rashes or lesions  Neurologic: Grossly normal          Data   Results for orders placed or performed during the hospital encounter of 04/19/22 (from the past 24 hour(s))   Glucose by meter   Result Value Ref Range    GLUCOSE BY METER POCT 123 (H) 70 - 99 mg/dL   CBC with platelets   Result Value Ref Range    WBC Count 10.8 4.0 - 11.0 10e3/uL    RBC Count 4.90 3.80 - 5.20 10e6/uL    Hemoglobin 6.5 (LL) 11.7 - 15.7 g/dL    Hematocrit 24.1 (L) 35.0 - 47.0 %    MCV 49 (L) 78 - 100 fL    MCH 13.3 (L) 26.5 - 33.0 pg    MCHC 27.0 (L) 31.5 - 36.5 g/dL    RDW 24.8 (H) 10.0 - 15.0 %    Platelet Count 342 150 - 450 10e3/uL   ABO/Rh type and screen    Narrative    The following orders were created for panel order ABO/Rh type and screen.  Procedure                               Abnormality         Status                     ---------                               -----------         ------                     Adult Type and Screen[[id number]]                            Final result                 Please view results for these tests on the individual orders.   Adult Type and  Screen   Result Value Ref Range    ABO/RH(D) A POS     Antibody Screen Negative Negative    SPECIMEN EXPIRATION DATE [id number]    Extra Tube    Narrative    The following orders were created for panel order Extra Tube.  Procedure                               Abnormality         Status                     ---------                               -----------         ------                     Extra Red Top Tube[[id number]]                               Final result                 Please view results for these tests on the individual orders.   Extra Red Top Tube   Result Value Ref Range    Hold Specimen JI    Prepare red blood cells (unit)   Result Value Ref Range    CROSSMATCH Compatible     UNIT ABO/RH A Pos     Unit Number [id number]     Unit Status Transfused     Blood Component Type Red Blood Cells     Product Code C0518N76     CODING SYSTEM [id number]     UNIT TYPE ISBT 6200     ISSUE DATE AND TIME 20220420090800    ABO/Rh type and screen *Canceled*    Narrative    The following orders were created for panel order ABO/Rh type and screen.  Procedure                               Abnormality         Status                     ---------                               -----------         ------                       Please view results for these tests on the individual orders.   CBC with Platelets & Differential *Canceled*    Narrative    The following orders were created for panel order CBC with Platelets & Differential.  Procedure                               Abnormality         Status                     ---------                               -----------         ------                       Please view results for these tests on the individual orders.   Prepare red blood cells (unit)   Result Value Ref Range    CROSSMATCH Compatible     UNIT ABO/RH A Pos     Unit Number [id number]     Unit Status Transfused     Blood Component Type Red Blood Cells     Product Code Q0478L59     CODING SYSTEM [id number]      UNIT TYPE ISBT 6200     ISSUE DATE AND TIME 20220420105600    Prepare red blood cells (unit)   Result Value Ref Range    CROSSMATCH Compatible     UNIT ABO/RH A Pos     Unit Number [id number]     Unit Status Ready     Blood Component Type Red Blood Cells     Product Code X0727G00     CODING SYSTEM [id number]     UNIT TYPE ISBT 6200    Prepare red blood cells (unit)   Result Value Ref Range    CROSSMATCH Compatible     UNIT ABO/RH A Pos     Unit Number [id number]     Unit Status Transfused     Blood Component Type Red Blood Cells     Product Code W0744H43     CODING SYSTEM [id number]     UNIT TYPE ISBT 6200     ISSUE DATE AND TIME 20220420124300    INR   Result Value Ref Range    INR 1.01 0.90 - 1.15   CBC with platelets   Result Value Ref Range    WBC Count 11.2 (H) 4.0 - 11.0 10e3/uL    RBC Count 5.03 3.80 - 5.20 10e6/uL    Hemoglobin 7.4 (L) 11.7 - 15.7 g/dL    Hematocrit 26.4 (L) 35.0 - 47.0 %    MCV 53 (L) 78 - 100 fL    MCH 14.7 (L) 26.5 - 33.0 pg    MCHC 28.0 (L) 31.5 - 36.5 g/dL    RDW 30.6 (H) 10.0 - 15.0 %    Platelet Count 315 150 - 450 10e3/uL   Partial thromboplastin time   Result Value Ref Range    aPTT 32 22 - 38 Seconds   Prepare red blood cells (unit)   Result Value Ref Range    CROSSMATCH Compatible     UNIT ABO/RH A Pos     Unit Number [id number]     Unit Status Ready     Blood Component Type Red Blood Cells     Product Code P5412K82     CODING SYSTEM [id number]     UNIT TYPE ISBT 6200    Glucose by meter   Result Value Ref Range    GLUCOSE BY METER POCT 111 (H) 70 - 99 mg/dL   CBC with platelets   Result Value Ref Range    WBC Count 11.7 (H) 4.0 - 11.0 10e3/uL    RBC Count 5.20 3.80 - 5.20 10e6/uL    Hemoglobin 8.3 (L) 11.7 - 15.7 g/dL    Hematocrit 28.4 (L) 35.0 - 47.0 %    MCV 55 (L) 78 - 100 fL    MCH 16.0 (L) 26.5 - 33.0 pg    MCHC 29.2 (L) 31.5 - 36.5 g/dL    RDW 34.4 (H) 10.0 - 15.0 %    Platelet Count 323 150 - 450 10e3/uL   Glucose by meter   Result Value Ref Range    GLUCOSE BY METER POCT  113 (H) 70 - 99 mg/dL   Creatinine   Result Value Ref Range    Creatinine 0.48 (L) 0.60 - 1.10 mg/dL    GFR Estimate >90 >60 mL/min/1.73m2   Extra Tube    Narrative    The following orders were created for panel order Extra Tube.  Procedure                               Abnormality         Status                     ---------                               -----------         ------                     Extra Purple Top Tube[[id number]]                                                         Please view results for these tests on the individual orders.   Glucose by meter   Result Value Ref Range    GLUCOSE BY METER POCT 101 (H) 70 - 99 mg/dL

## 2022-04-21 NOTE — PROGRESS NOTES
Csection - Post operative day 1    ASSESSMENT: PLAN:   POD#1    Repeat csection  N/v- will continue IV fluid, zofran and IV dilaudid until doing better with po   Gestational diabetes although suspect type 2 diabetes sugars are doing well- per hospiitlist will start metformin when taking po better  Chronic iron deficiency anemia- s/p 3 units prbcs  Continue routine cares       SUBJECTIVE:    The patient feels well: Catheter is out, bleeding decreased, tolerating normal diet, and passing flatus.  Pain is well controlled. The patient has no emotional concerns.  The baby is well and being fed    OBJECTIVE:  /72 (BP Location: Right arm, Patient Position: Semi-Orosco's, Cuff Size: Adult Large)   Pulse 62   Temp 99  F (37.2  C) (Oral)   Resp 18   Ht 1.524 m (5')   Wt 99.8 kg (220 lb)   LMP 07/28/2021 (Exact Date)   SpO2 96%   Breastfeeding Unknown   BMI 42.97 kg/m      Fundus firm  Incision- dressing dry   Ext- nontender      Lab  Hemoglobin   Date Value Ref Range Status   04/20/2022 8.3 (L) 11.7 - 15.7 g/dL Final   ]  Your Basic metabolic lab results:  Lab Results   Component Value Date     10/08/2021     (desirable 133-144)  Lab Results   Component Value Date    POTASSIUM 4.4 10/08/2021    (desirable 3.4-5.3)  Lab Results   Component Value Date     04/20/2022     10/08/2021     (normal value without eating is below 100; concerning if greater than 126 while fasting)  Lab Results   Component Value Date    BUN 7 10/08/2021    (kidney function test and for hydration; desirable 7-30)  Lab Results   Component Value Date    CR 0.48 04/20/2022     (kidney function test; desirable 0.52-1.04)  Lab Results   Component Value Date    JOLIE 10.1 10/08/2021     (desirable 8.5-10.4)        Rachana Lacy MD  [facility]  [phone]

## 2022-04-21 NOTE — PROGRESS NOTES
Pt notified of orders from Dr. Rodriguez. Rating pain as 6/10 when not moving. Refused RN to assess fundus. meds given per order. Notified pt of plan to keep catheter in until morning and when able to get pain, nausea and dizziness under control, will work on getting her up and ambulating. Pt stated that she did not feel good now and cannot do it. Reassured pt that it is not at this time that she needs to get up. Enc rest and sleep if able. SO and another adult present in room with pt at this time.Brittany Swanson, RN

## 2022-04-21 NOTE — PROGRESS NOTES
GDM Followup Note    Assessment/Plan  31 year old  currently postpartum. Has pregestational diabetes, also being followed for LTBI and anemia.    #Pregestational diabetes  Patient previously on metformin but spotty medication compliance and would often stop taking this medication on her own.  Initiated on insulin when she presented at 11 weeks gestation.  Since then had a few visits with endocrine, was supposed to be on Lantus and NovoLog but noncompliant. A1c here 9.5. Pregnancy complicated by polyhydramnios and fetal macrosomia.  Postpartum blood sugars running 106-134.  Her last dose of Lantus was on  in the evening.  Continue monitoring blood sugars.  Although she does not apparently plan to breast-feed, her body is likely using more calories right now with generating milk supply. Unsure how good her oral intake is right now either, she had only clear liquids on tray this morning.  For right now would not start any DM medications, continue monitoring BG.  May need to resume metformin before discharge.    #Severe microcytic anemia  Patient presented with hemoglobin of 6.6, transfused 3 units PRBCs  Intra-Op  ml  Hemoglobin this morning 7.7   Would recheck in the morning.  Aim for hemoglobin greater than 7  Ferritin severely low at 4.  PRBCs that she received should provide some iron repletion.  She did also get some Venofer here. Would be good to start her on ferrous sulfate if she is agreeable, she seems a little bit medication averse based on her history.  GI consulted for evaluation    #LTBI  Seen by [facility] due to positive Quantiferon screen, supposed to get a CXR after delivery. I am doubtful that a CXR here will satisfy [facility] for their protocols so we will defer CXR to the outpatient setting. I think patient will follow up because it sounds like she needs this clarified for her employer. She is supposed to start on a course of treatment for LTBI.    #Postpartum    Seems to be doing ok postop.  Sent in from clinic at 37w3d for  due to new onset PIH, poorly controlled DM, polyhydramnios.  Pregnancy complicated by poor follow-up with prenatal care, polyhydramnios, fetal macrosomia (9 lb 14 oz at birth), poorly controlled gestational diabetes  Infant is currently in the NICU    #Hypokalemia  Daily KCl 20mEq while here. Do not think she would need to continue this at discharge.    #Hypomag  Daily Slow Mag while here. Do not think she would need to continue this at discharge.    Subjective    Patient is postpartum day 1, doing well. BG have been fairly well controlled. Has not needed any sliding scale insulin. Complains of lower abdominal pain. Does not have any questions or concerns for me.    Objective    Vital signs in last 24 hours Temp:  [98.5  F (36.9  C)-99.6  F (37.6  C)] 98.8  F (37.1  C)  Pulse:  [61-75] 73  Resp:  [16-18] 16  BP: (116-146)/(67-85) 122/67  SpO2:  [86 %-100 %] 99 %    Intake/Output last 3 shift I/O last 3 completed shifts:  In: 2785 [P.O.:200; I.V.:1500]  Out: 1575 [Urine:850; Blood:725]  Intake/Output this shift: I/O this shift:  In: 929 [P.O.:340; I.V.:589]  Out: 400 [Urine:400]    Review of Systems   Negative except as mentioned in the HPI.    Physical Exam  General appearance: alert and oriented x3, appears stated age, cooperative and no distress  Head: Normocephalic, without obvious abnormality, atraumatic  Eyes: conjunctivae/corneas clear. PERRL, EOM's intact.  Throat: lips, mucosa, and tongue normal; some dental discoloration  Extremities: extremities normal, atraumatic, no cyanosis or edema  Skin: Skin color, texture, turgor normal. No rashes or lesions  Neurologic: Grossly normal   Psych: disinterested in conversation.    Pertinent Labs  Lab Results: personally reviewed.  Recent Results (from the past 24 hour(s))   CBC with platelets    Collection Time: 22  2:16 PM   Result Value Ref Range    WBC Count 11.7 (H) 4.0 - 11.0  10e3/uL    RBC Count 5.20 3.80 - 5.20 10e6/uL    Hemoglobin 8.3 (L) 11.7 - 15.7 g/dL    Hematocrit 28.4 (L) 35.0 - 47.0 %    MCV 55 (L) 78 - 100 fL    MCH 16.0 (L) 26.5 - 33.0 pg    MCHC 29.2 (L) 31.5 - 36.5 g/dL    RDW 34.4 (H) 10.0 - 15.0 %    Platelet Count 323 150 - 450 10e3/uL   Glucose by meter    Collection Time: 04/20/22  5:52 PM   Result Value Ref Range    GLUCOSE BY METER POCT 113 (H) 70 - 99 mg/dL   Creatinine    Collection Time: 04/20/22  6:18 PM   Result Value Ref Range    Creatinine 0.48 (L) 0.60 - 1.10 mg/dL    GFR Estimate >90 >60 mL/min/1.73m2   Glucose by meter    Collection Time: 04/20/22  9:58 PM   Result Value Ref Range    GLUCOSE BY METER POCT 101 (H) 70 - 99 mg/dL   Comprehensive metabolic panel    Collection Time: 04/21/22  6:59 AM   Result Value Ref Range    Sodium 133 (L) 136 - 145 mmol/L    Potassium 3.1 (L) 3.5 - 5.0 mmol/L    Chloride 104 98 - 107 mmol/L    Carbon Dioxide (CO2) 22 22 - 31 mmol/L    Anion Gap 7 5 - 18 mmol/L    Urea Nitrogen 5 (L) 8 - 22 mg/dL    Creatinine 0.48 (L) 0.60 - 1.10 mg/dL    Calcium 8.2 (L) 8.5 - 10.5 mg/dL    Glucose 110 70 - 125 mg/dL    Alkaline Phosphatase 75 45 - 120 U/L    AST 32 0 - 40 U/L    ALT 16 0 - 45 U/L    Protein Total 5.3 (L) 6.0 - 8.0 g/dL    Albumin 1.9 (L) 3.5 - 5.0 g/dL    Bilirubin Total 0.8 0.0 - 1.0 mg/dL    GFR Estimate >90 >60 mL/min/1.73m2   INR    Collection Time: 04/21/22  6:59 AM   Result Value Ref Range    INR 1.03 0.85 - 1.15   CBC with platelets    Collection Time: 04/21/22  6:59 AM   Result Value Ref Range    WBC Count 14.0 (H) 4.0 - 11.0 10e3/uL    RBC Count 4.85 3.80 - 5.20 10e6/uL    Hemoglobin 7.7 (L) 11.7 - 15.7 g/dL    Hematocrit 26.5 (L) 35.0 - 47.0 %    MCV 55 (L) 78 - 100 fL    MCH 15.9 (L) 26.5 - 33.0 pg    MCHC 29.1 (L) 31.5 - 36.5 g/dL    RDW 33.3 (H) 10.0 - 15.0 %    Platelet Count 289 150 - 450 10e3/uL   Magnesium    Collection Time: 04/21/22  6:59 AM   Result Value Ref Range    Magnesium 1.5 (L) 1.8 - 2.6 mg/dL    RBC and Platelet Morphology    Collection Time: 04/21/22  6:59 AM   Result Value Ref Range    Platelet Assessment  Automated Count Confirmed. Platelet morphology is normal.     Automated Count Confirmed. Platelet morphology is normal.    Elliptocytes Slight (A) None Seen    Polychromasia Slight (A) None Seen    Spherocytes Slight (A) None Seen    Target Cells Slight (A) None Seen    RBC Morphology Confirmed RBC Indices    Glucose by meter    Collection Time: 04/21/22  7:05 AM   Result Value Ref Range    GLUCOSE BY METER POCT 106 (H) 70 - 99 mg/dL   Glucose by meter    Collection Time: 04/21/22 12:40 PM   Result Value Ref Range    GLUCOSE BY METER POCT 134 (H) 70 - 99 mg/dL       Litzy Morales MD  [facility] Medicine Service

## 2022-04-21 NOTE — PROGRESS NOTES
Dr. Issa in to see pt. Discussed diabetes. Will place orders.   Pt cont with itching. nubain given.   Report to [facility].Brittany Swanson RN

## 2022-04-21 NOTE — PLAN OF CARE
Problem: Pain (Postpartum  Delivery)  Goal: Acceptable Pain Control  Outcome: Ongoing, Progressing  Patient will rate pain less than or equal to 2.  Assessed pain level and administered pain medication as ordered and prn.

## 2022-04-21 NOTE — PLAN OF CARE
Problem: Pain (Postpartum  Delivery)  Goal: Acceptable Pain Control  Outcome: Ongoing, Progressing     OB checks WNL and vss. Dressing c/d/I. Martin remains in place as pt has not ambulated independently and without symptoms. Blood glucose 170 this evening, 1 unit insulin given per order. Pt is taking Ibuprofen and oxy for pain management. RN assisting pt with use of . Significant other at bedside.

## 2022-04-21 NOTE — ANESTHESIA POSTPROCEDURE EVALUATION
Patient: Carlos Oliveros    Procedure: Procedure(s):   SECTION       Anesthesia Type:  Epidural    Note:  Disposition: Inpatient   Postop Pain Control: Uneventful            Sign Out: Well controlled pain   PONV: No   Neuro/Psych: Uneventful            Sign Out: Acceptable/Baseline neuro status   Airway/Respiratory: Uneventful            Sign Out: Acceptable/Baseline resp. status   CV/Hemodynamics: Uneventful            Sign Out: Acceptable CV status; No obvious hypovolemia; No obvious fluid overload   Other NRE: NONE   DID A NON-ROUTINE EVENT OCCUR? No    Event details/Postop Comments:  Neuraxial block has worn off, patient about to move and feel legs bilaterally. Pain controlled. Denies headache or back pain. No further questions or concerns. Instructed that we are available  should she have questions pertaining to her spinal.             Last vitals:  Vitals Value Taken Time   /83 22   Temp 37.6  C (99.6  F) 22 193   Pulse 61 22   Resp 18 22   SpO2 100 % 22   Vitals shown include unvalidated device data.    Electronically Signed By: Humberto Dejesus MD  2022  8:40 PM

## 2022-04-21 NOTE — PROGRESS NOTES
Blood sugar at this time is 101. Temp 99.2 orally. Pt in high fowlers. States she feels ok in this position. States pain is a better after meds and in this position. Requested hot water. Brought water and put bedside with instructions to go slow and carefully, both because it is hot and because of nausea. Pt nodded agreement. Brittany Swanson, RN

## 2022-04-21 NOTE — PLAN OF CARE
Problem: Postoperative Urinary Retention (Postpartum  Delivery)  Goal: Effective Urinary Elimination  Outcome: Ongoing, Progressing     Problem: Postoperative Nausea and Vomiting (Postpartum  Delivery)  Goal: Nausea and Vomiting Relief  Outcome: Ongoing, Progressing  Intervention: Prevent or Manage Postoperative Nausea and Vomiting  Recent Flowsheet Documentation  Taken 2022 by Brittany Swanson, RN  Nausea/Vomiting Interventions:    antiemetic    sips of clear liquids given    stimuli minimized     Problem: Pain (Postpartum  Delivery)  Goal: Acceptable Pain Control  Outcome: Ongoing, Progressing     Problem: Bleeding (Postpartum  Delivery)  Goal: Hemostasis  Outcome: Ongoing, Progressing  Intervention: Monitor and Manage Postpartum Bleeding  Recent Flowsheet Documentation  Taken 2022 by Brittany Swanson RN  Syncope Management:    head lowered    position changed slowly     Bps remain 130's-140's/70's-80's. Temp 99.2 orally. FF and 1-2cm above umbilicus. Uterus still quite large and not displaced. Scant amount of lochia. No clots.   Pt states pain is better. Martin patent. .Brittany Swanson, CAMI

## 2022-04-22 LAB
GLUCOSE BLDC GLUCOMTR-MCNC: 119 MG/DL (ref 70–99)
GLUCOSE BLDC GLUCOMTR-MCNC: 139 MG/DL (ref 70–99)
GLUCOSE BLDC GLUCOMTR-MCNC: 143 MG/DL (ref 70–99)
GLUCOSE BLDC GLUCOMTR-MCNC: 158 MG/DL (ref 70–99)
HGB A1 MFR BLD: 95.1 %
HGB A2 MFR BLD: 3.8 %
HGB BLD-MCNC: 7.4 G/DL (ref 11.7–15.7)
HGB C MFR BLD: 0 %
HGB E MFR BLD: 0 %
HGB F MFR BLD: 1.1 %
HGB FRACT BLD ELPH-IMP: ABNORMAL
HGB OTHER MFR BLD: 0 %
HGB S BLD QL SOLY: ABNORMAL
HGB S MFR BLD: 0 %
HOLD SPECIMEN: NORMAL
PATH INTERP BLD-IMP: ABNORMAL

## 2022-04-22 PROCEDURE — 250N000013 HC RX MED GY IP 250 OP 250 PS 637: Performed by: FAMILY MEDICINE

## 2022-04-22 PROCEDURE — 36415 COLL VENOUS BLD VENIPUNCTURE: CPT | Performed by: HOSPITALIST

## 2022-04-22 PROCEDURE — 85018 HEMOGLOBIN: CPT | Performed by: HOSPITALIST

## 2022-04-22 PROCEDURE — 250N000013 HC RX MED GY IP 250 OP 250 PS 637: Performed by: OBSTETRICS & GYNECOLOGY

## 2022-04-22 PROCEDURE — 250N000011 HC RX IP 250 OP 636: Performed by: OBSTETRICS & GYNECOLOGY

## 2022-04-22 PROCEDURE — 250N000013 HC RX MED GY IP 250 OP 250 PS 637: Performed by: HOSPITALIST

## 2022-04-22 PROCEDURE — 120N000001 HC R&B MED SURG/OB

## 2022-04-22 RX ORDER — METFORMIN HCL 500 MG
500 TABLET, EXTENDED RELEASE 24 HR ORAL
Status: DISCONTINUED | OUTPATIENT
Start: 2022-04-22 | End: 2022-04-23 | Stop reason: HOSPADM

## 2022-04-22 RX ORDER — METFORMIN HCL 500 MG
500 TABLET, EXTENDED RELEASE 24 HR ORAL
Qty: 30 TABLET | Refills: 0 | Status: SHIPPED | OUTPATIENT
Start: 2022-04-22

## 2022-04-22 RX ORDER — ACETAMINOPHEN 500 MG
500-1000 TABLET ORAL EVERY 6 HOURS PRN
Qty: 60 TABLET | Refills: 0 | Status: SHIPPED | OUTPATIENT
Start: 2022-04-22

## 2022-04-22 RX ORDER — AMOXICILLIN 250 MG
1 CAPSULE ORAL 2 TIMES DAILY
Qty: 28 TABLET | Refills: 0 | Status: SHIPPED | OUTPATIENT
Start: 2022-04-22

## 2022-04-22 RX ORDER — FERROUS SULFATE 325(65) MG
325 TABLET ORAL 2 TIMES DAILY
Qty: 60 TABLET | Refills: 1 | Status: SHIPPED | OUTPATIENT
Start: 2022-04-22

## 2022-04-22 RX ORDER — OXYCODONE HYDROCHLORIDE 5 MG/1
5 TABLET ORAL EVERY 4 HOURS PRN
Qty: 15 TABLET | Refills: 0 | Status: SHIPPED | OUTPATIENT
Start: 2022-04-22

## 2022-04-22 RX ADMIN — POTASSIUM CHLORIDE 20 MEQ: 1500 TABLET, EXTENDED RELEASE ORAL at 09:28

## 2022-04-22 RX ADMIN — ACETAMINOPHEN 975 MG: 325 TABLET ORAL at 16:08

## 2022-04-22 RX ADMIN — METFORMIN HYDROCHLORIDE 500 MG: 500 TABLET, EXTENDED RELEASE ORAL at 17:18

## 2022-04-22 RX ADMIN — OXYCODONE HYDROCHLORIDE 5 MG: 5 TABLET ORAL at 20:39

## 2022-04-22 RX ADMIN — ACETAMINOPHEN 975 MG: 325 TABLET ORAL at 02:42

## 2022-04-22 RX ADMIN — OXYCODONE HYDROCHLORIDE 5 MG: 5 TABLET ORAL at 10:02

## 2022-04-22 RX ADMIN — OXYCODONE HYDROCHLORIDE 5 MG: 5 TABLET ORAL at 16:08

## 2022-04-22 RX ADMIN — ENOXAPARIN SODIUM 40 MG: 40 INJECTION SUBCUTANEOUS at 17:17

## 2022-04-22 RX ADMIN — SENNOSIDES AND DOCUSATE SODIUM 2 TABLET: 50; 8.6 TABLET ORAL at 09:26

## 2022-04-22 RX ADMIN — SENNOSIDES AND DOCUSATE SODIUM 2 TABLET: 50; 8.6 TABLET ORAL at 20:39

## 2022-04-22 RX ADMIN — OXYCODONE HYDROCHLORIDE 5 MG: 5 TABLET ORAL at 00:57

## 2022-04-22 RX ADMIN — MAGNESIUM 64 MG (MAGNESIUM CHLORIDE) TABLET,DELAYED RELEASE 535 MG: at 09:27

## 2022-04-22 RX ADMIN — PANTOPRAZOLE SODIUM 40 MG: 40 TABLET, DELAYED RELEASE ORAL at 09:28

## 2022-04-22 RX ADMIN — OXYCODONE HYDROCHLORIDE 5 MG: 5 TABLET ORAL at 05:58

## 2022-04-22 RX ADMIN — INSULIN ASPART 1 UNITS: 100 INJECTION, SOLUTION INTRAVENOUS; SUBCUTANEOUS at 12:07

## 2022-04-22 RX ADMIN — ENOXAPARIN SODIUM 40 MG: 40 INJECTION SUBCUTANEOUS at 05:59

## 2022-04-22 RX ADMIN — ACETAMINOPHEN 975 MG: 325 TABLET ORAL at 09:28

## 2022-04-22 RX ADMIN — ACETAMINOPHEN 975 MG: 325 TABLET ORAL at 22:56

## 2022-04-22 NOTE — PLAN OF CARE
Problem: Plan of Care - These are the overarching goals to be used throughout the patient stay.    Goal: Optimal Comfort and Wellbeing  Outcome: Ongoing, Progressing  Intervention: Provide Person-Centered Care  Recent Flowsheet Documentation  Taken 4/22/2022 0057 by Lesli Vyas RN  Trust Relationship/Rapport:    care explained    questions answered     Patient is doing okay this shift. VSS, fundus is firm without massage. Taking oxycodone, tylenol for pain. Martin cath removed at 0600. Patient is due to void.

## 2022-04-22 NOTE — PROGRESS NOTES
GDM Followup Note    Assessment/Plan  31 year old  currently postpartum. Has pregestational diabetes, also being followed for LTBI and anemia.    #Pregestational diabetes  Patient previously on metformin but spotty medication compliance and would often stop taking this medication on her own.  Initiated on insulin when she presented at 11 weeks gestation.  Since then had a few visits with endocrine, was supposed to be on Lantus and NovoLog but noncompliant recently. A1c here 9.5. Pregnancy complicated by polyhydramnios and fetal macrosomia.  Now that Lantus from 2 days ago worn off, bedtime BG was 237 and AM fasting was 139, both suboptimally controlled. Will initiate patient on metformin ER 500mg once daily. Would not plan to uptitrate here, but would potentially add low dose of glipizide if continues to be hyperglycemic. Though expect she may discharge home soon.  Continue monitoring blood sugars.  DM educator did meet with patient here, appreciate assistance.  Not planning to visit with patient today so please notify me if there is a change in clinical condition or if she changes her mind re:breastfeeding or any other concerns. [facility] [place] to follow while she is here.    #Severe microcytic anemia  Patient presented with hemoglobin of 6.6, transfused 3 units PRBCs  Intra-Op  ml  Hemoglobin this morning 7.4 and fairly stable  Ferritin severely low at 4.  PRBCs that she received should provide some iron repletion.  She did also get some Venofer here. Would be good to start her on ferrous sulfate if she is agreeable, she seems a little bit medication averse based on her history.  GI consult appears to have been canceled and this is likely ok. Patient could see GI as outpatient if remains anemic.    #LTBI  Seen by [facility] TB clinic due to positive Quantiferon screen, supposed to get a CXR after delivery. I am doubtful that a CXR here will satisfy [facility] for their protocols so we will  defer CXR to the outpatient setting. I think patient will follow up because it sounds like she needs this clarified for her employer. She is supposed to start on a course of treatment for LTBI.    #Postpartum   Seems to be doing ok postop.  Sent in from clinic at 37w3d for  due to new onset PIH, poorly controlled DM, polyhydramnios.  Pregnancy complicated by poor follow-up with prenatal care, polyhydramnios, fetal macrosomia (9 lb 14 oz at birth), poorly controlled gestational diabetes  Infant is currently in the NICU    #Hypokalemia  Daily KCl 20mEq while here. Do not think she would need to continue this at discharge.    #Hypomag  Daily Slow Mag while here. Do not think she would need to continue this at discharge.    Subjective    Patient is postpartum day 2. Not seen personally today. Chart reviewed.    Objective    Vital signs in last 24 hours Temp:  [98.1  F (36.7  C)-98.8  F (37.1  C)] 98.1  F (36.7  C)  Pulse:  [73-92] 73  Resp:  [16-18] 18  BP: (122-143)/(67-83) 139/83  SpO2:  [98 %-100 %] 98 %    Intake/Output last 3 shift I/O last 3 completed shifts:  In: 1118 [P.O.:440; I.V.:678]  Out: 3675 [Urine:3675]  Intake/Output this shift: No intake/output data recorded.    Review of Systems   Deferred     Physical Exam  Deferred    Pertinent Labs  Lab Results: personally reviewed.  Recent Results (from the past 24 hour(s))   Glucose by meter    Collection Time: 22 12:40 PM   Result Value Ref Range    GLUCOSE BY METER POCT 134 (H) 70 - 99 mg/dL   Glucose by meter    Collection Time: 22  6:18 PM   Result Value Ref Range    GLUCOSE BY METER POCT 170 (H) 70 - 99 mg/dL   Glucose by meter    Collection Time: 22  9:07 PM   Result Value Ref Range    GLUCOSE BY METER POCT 237 (H) 70 - 99 mg/dL   Hemoglobin    Collection Time: 22  6:59 AM   Result Value Ref Range    Hemoglobin 7.4 (L) 11.7 - 15.7 g/dL   Glucose by meter    Collection Time: 22  8:05 AM   Result Value Ref Range     GLUCOSE BY METER POCT 139 (H) 70 - 99 mg/dL       Litzy Morales MD  [facility] Medicine Service

## 2022-04-23 VITALS
HEART RATE: 74 BPM | RESPIRATION RATE: 16 BRPM | HEIGHT: 60 IN | OXYGEN SATURATION: 99 % | SYSTOLIC BLOOD PRESSURE: 135 MMHG | WEIGHT: 220 LBS | TEMPERATURE: 98 F | BODY MASS INDEX: 43.19 KG/M2 | DIASTOLIC BLOOD PRESSURE: 83 MMHG

## 2022-04-23 LAB
BACTERIA SPEC CULT: ABNORMAL
GLUCOSE BLDC GLUCOMTR-MCNC: 115 MG/DL (ref 70–99)
PLATELET # BLD AUTO: 316 10E3/UL (ref 150–450)

## 2022-04-23 PROCEDURE — 250N000011 HC RX IP 250 OP 636: Performed by: OBSTETRICS & GYNECOLOGY

## 2022-04-23 PROCEDURE — 99232 SBSQ HOSP IP/OBS MODERATE 35: CPT | Performed by: HOSPITALIST

## 2022-04-23 PROCEDURE — 250N000013 HC RX MED GY IP 250 OP 250 PS 637: Performed by: OBSTETRICS & GYNECOLOGY

## 2022-04-23 PROCEDURE — 250N000013 HC RX MED GY IP 250 OP 250 PS 637: Performed by: FAMILY MEDICINE

## 2022-04-23 PROCEDURE — 36415 COLL VENOUS BLD VENIPUNCTURE: CPT | Performed by: OBSTETRICS & GYNECOLOGY

## 2022-04-23 PROCEDURE — 250N000013 HC RX MED GY IP 250 OP 250 PS 637: Performed by: HOSPITALIST

## 2022-04-23 PROCEDURE — 85049 AUTOMATED PLATELET COUNT: CPT | Performed by: OBSTETRICS & GYNECOLOGY

## 2022-04-23 RX ADMIN — OXYCODONE HYDROCHLORIDE 5 MG: 5 TABLET ORAL at 00:40

## 2022-04-23 RX ADMIN — MAGNESIUM 64 MG (MAGNESIUM CHLORIDE) TABLET,DELAYED RELEASE 535 MG: at 10:03

## 2022-04-23 RX ADMIN — POTASSIUM CHLORIDE 20 MEQ: 1500 TABLET, EXTENDED RELEASE ORAL at 10:05

## 2022-04-23 RX ADMIN — OXYCODONE HYDROCHLORIDE 5 MG: 5 TABLET ORAL at 05:42

## 2022-04-23 RX ADMIN — PANTOPRAZOLE SODIUM 40 MG: 40 TABLET, DELAYED RELEASE ORAL at 08:07

## 2022-04-23 RX ADMIN — ACETAMINOPHEN 975 MG: 325 TABLET ORAL at 05:42

## 2022-04-23 RX ADMIN — ENOXAPARIN SODIUM 40 MG: 40 INJECTION SUBCUTANEOUS at 05:46

## 2022-04-23 RX ADMIN — ACETAMINOPHEN 975 MG: 325 TABLET ORAL at 12:07

## 2022-04-23 RX ADMIN — OXYCODONE HYDROCHLORIDE 5 MG: 5 TABLET ORAL at 10:01

## 2022-04-23 RX ADMIN — SENNOSIDES AND DOCUSATE SODIUM 2 TABLET: 50; 8.6 TABLET ORAL at 10:00

## 2022-04-23 NOTE — LACTATION NOTE
This note was copied from a baby's chart.  This writer met with Eh in her room and her cousin was able to interpret.  RN was told that Eh was only formula feeding.  RN in NICU called this writer and states that Eh was just in the NICU.  Her breasts are very full and infant was able to latch onto the breast and relieve the fullness.  RN from NICU asked this writer to see Eh.  She was taught hand expression and was able to express one ml in 10 seconds.  This writer notes she has large nipples and gave her 27 mm flanges for the breast pump.  Education given that the breasts need to be drained at least 8 times in 24 hours, in order to build and protect the milk supply.  If mother breastfeeds, then infant has a bottle, Eh should pump.  Eh states she has had enough and does not want to pump now.  She wants to go home before it rains and is waiting for the doctor to discharge her.  Eh verbalizes understanding and has no questions.

## 2022-04-23 NOTE — DISCHARGE INSTRUCTIONS

## 2022-04-23 NOTE — PROGRESS NOTES
[facility] Progress Note    S:  Doing well.  Continues to improve.  Pain is well-controlled.  No fevers.  Tolerating PO. Ambulating. Voiding.       O:  Temp:  [98  F (36.7  C)-98.7  F (37.1  C)] 98  F (36.7  C)  Pulse:  [69-78] 74  Resp:  [14-18] 16  BP: (135-149)/(83-93) 135/83  SpO2:  [98 %-99 %] 99 %   Abdomen - Soft, appropriate post op discomfort.   Incision - clean, dry, no erythema, induration or drainage  Extremities - No c/c/e    A POS   Hgb: 6.6 --> 3 units PRBCs --> 6.5 --> 7.4 --> 8.3 --> 7.7    A:  Post-operative day #3  Procedure(s):   SECTION  Doing well.    P:  - Acute on chronic anemia - Pt s/p 3 units PRBCs and Venofer. Currently asymptomatic. Continue to monitor symptoms.   - Pregestational diabetes - Was started on Metformin  mg once daily. Non compliant diabetic.   - Latent TB - Needs follow up after discharge  - Ambulate  - Advance activity as tolerated  - Continue supportive and symptomatic treatment  - OK to discharge home today.     Patricia Andrews MD  (P) [phone]

## 2022-04-23 NOTE — PLAN OF CARE
Carlos Giselle's HR, RR, Temp stable and wnl.  BP was up above normal limits earlier today; down wnl later.  Carlos is ambulating ably in her room and to the NICU today.    Problem: Diabetes in Pregnancy  Goal: Blood Glucose Level Within Targeted Range  Outcome: Ongoing, Progressing   Blood glucose levels necessitated insulin X 1 today.  Bedtime blood sugars not yet done at this time.  She's been eating diabetic diet with lots of vegetables.    Problem: Pain (Postpartum  Delivery)  Goal: Acceptable Pain Control  Outcome: Ongoing, Progressing  Intervention: Prevent or Manage Pain  Recent Flowsheet Documentation  Taken 2022 by Izabel Ochoa, RN  Pain Management Interventions: medication (see MAR)  Taken 2022 1608 by Izabel Ochoa RN  Pain Management Interventions: medication (see MAR)  Taken 2022 0930 by Izabel Ochoa, RN  Pain Management Interventions: medication (see MAR)   Carlos's pain has been well-controlled with Oxy and Tylenol.  When resting or ambulating she rates pain 0-1; when getting in and out of bed, she rates it at 4.  Carlos said she wants to wait until she's home tomorrow to shower.  She understands that baby needs to stay at the hospital for days to weeks.

## 2022-04-23 NOTE — PROGRESS NOTES
In to see patient.  Patient hopeful for discharge home ASAP.  Dr Andrews notified of patient request, unavailable at current but agrees patient ready for discharge today.  Page sent to LUNA Morales hospitalist per Dr Andrews request to address home medication review and discharge patient upon medical readiness.

## 2022-04-23 NOTE — PROGRESS NOTES
GDM Followup Note    Assessment/Plan  31 year old  currently postpartum. HMS following for pregestational diabetes, also LTBI and anemia.    #Pregestational diabetes  Patient previously on metformin but spotty medication compliance and would often stop taking this medication on her own.  Initiated on insulin when she presented at 11 weeks gestation.  Since then had a few visits with endocrine, was supposed to be on Lantus and NovoLog but noncompliant recently. A1c here 9.5. Pregnancy complicated by polyhydramnios and fetal macrosomia.  Started on metformin ER 500mg once daily, first dose was last evening. AM fasting BG today improved at 115. Would not plan to uptitrate metformin before discharge. Patient should monitor BG and follow up soon with primary care.  Continue monitoring blood sugars here.  DM educator did meet with patient here, appreciate assistance.  Patient counseled on how to take metformin and that she does not need insulin any longer.    #Severe microcytic anemia  Patient presented with hemoglobin of 6.6, transfused 3 units PRBCs  Intra-Op  ml  Hemoglobin yesterday morning 7.4 and fairly stable  Ferritin severely low at 4.  PRBCs that she received should provide some iron repletion.  She did also get some Venofer here. Would be good to start her on ferrous sulfate and she is agreeable. Did  her on constipation from this med and she has also been prescribed stool softener and stated she will take this.  Patient could see GI as outpatient if remains anemic.    #LTBI  Seen by [facility] TB clinic due to positive Quantiferon screen, supposed to get a CXR after delivery. Defer CXR to the outpatient setting, patient tells me she has an appt in . I think patient will follow up because it sounds like she needs this clarified for her employer. She is supposed to start on a course of treatment for LTBI.    #Postpartum   Seems to be doing ok postop.  Sent in from clinic at  37w3d for  due to new onset PIH, poorly controlled DM, polyhydramnios.  Pregnancy complicated by poor follow-up with prenatal care, polyhydramnios, fetal macrosomia (9 lb 14 oz at birth), poorly controlled gestational diabetes  Infant is currently in the NICU    #Hypokalemia  Daily KCl 20mEq while here. She need not continue this at discharge.    #Hypomag  Daily Slow Mag while here. She need not continue this at discharge.    Subjective    Patient is postpartum day 3. States she is doing well and would like to go home. She is sad that the baby remains in the NICU. She has followup with [facility] scheduled in .    Objective    Vital signs in last 24 hours Temp:  [97.8  F (36.6  C)-98.7  F (37.1  C)] 98.5  F (36.9  C)  Pulse:  [74-79] 74  Resp:  [14-18] 14  BP: (137-151)/(86-92) 138/88  SpO2:  [98 %-99 %] 98 %    Intake/Output last 3 shift No intake/output data recorded.  Intake/Output this shift: No intake/output data recorded.    Review of Systems   Deferred     Physical Exam  General: in no apparent distress, non-toxic and alert female sitting in bedside chair oriented x3  HEENT: Head normocephalic atraumatic, oral mucosa moist. Sclerae anicteric  Skin: No rashes or lesions. Lips a bit pale  Extremities: No peripheral edema  Psych: Normal affect, mood euthymic  Neuro: CNII-XII grossly intact, moving all 4 extremities      Pertinent Labs  Lab Results: personally reviewed.  Recent Results (from the past 24 hour(s))   Glucose by meter    Collection Time: 22  8:05 AM   Result Value Ref Range    GLUCOSE BY METER POCT 139 (H) 70 - 99 mg/dL   Glucose by meter    Collection Time: 22 11:55 AM   Result Value Ref Range    GLUCOSE BY METER POCT 143 (H) 70 - 99 mg/dL   Glucose by meter    Collection Time: 22  5:13 PM   Result Value Ref Range    GLUCOSE BY METER POCT 119 (H) 70 - 99 mg/dL   Glucose by meter    Collection Time: 22 10:55 PM   Result Value Ref Range    GLUCOSE BY METER POCT  158 (H) 70 - 99 mg/dL   Glucose by meter    Collection Time: 04/23/22  7:03 AM   Result Value Ref Range    GLUCOSE BY METER POCT 115 (H) 70 - 99 mg/dL   Platelet count    Collection Time: 04/23/22  7:06 AM   Result Value Ref Range    Platelet Count 316 150 - 450 10e3/uL       Litzy Morales MD  [facility] Medicine Service

## 2022-04-23 NOTE — PLAN OF CARE
"Patient is managing pain with Tylenol and oxycodone.   Postpartum assessment WNL.   Patient is hopeful for discharge to home today.   Tdap and flu up to date.   Mood assessment and birth certificate are complete.    Problem: Plan of Care - These are the overarching goals to be used throughout the patient stay.    Goal: Patient-Specific Goal (Individualized)  Description: You can add care plan individualizations to a care plan. Examples of Individualization might be:  \"Parent requests to be called daily at 9am for status\", \"I have a hard time hearing out of my right ear\", or \"Do not touch me to wake me up as it startles me\".  Outcome: Ongoing, Progressing  Goal: Absence of Hospital-Acquired Illness or Injury  Outcome: Ongoing, Progressing  Intervention: Prevent Skin Injury  Recent Flowsheet Documentation  Taken 4/23/2022 0040 by Maria M Bright RN  Body Position: position changed independently  Intervention: Prevent and Manage VTE (Venous Thromboembolism) Risk  Recent Flowsheet Documentation  Taken 4/23/2022 0040 by Maria M Bright RN  Activity Management:   ambulated in room   ambulated to bathroom   up ad rigo  Goal: Optimal Comfort and Wellbeing  Outcome: Ongoing, Progressing  Intervention: Monitor Pain and Promote Comfort  Recent Flowsheet Documentation  Taken 4/23/2022 0040 by Maria M Bright RN  Pain Management Interventions:   medication (see MAR)   repositioned   rest  Intervention: Provide Person-Centered Care  Recent Flowsheet Documentation  Taken 4/23/2022 0040 by Maria M Bright RN  Trust Relationship/Rapport:   care explained   choices provided   emotional support provided   empathic listening provided   questions answered   questions encouraged   reassurance provided   thoughts/feelings acknowledged  Goal: Readiness for Transition of Care  Outcome: Ongoing, Progressing     Problem: Diabetes in Pregnancy  Goal: Blood Glucose Level Within Targeted Range  Outcome: Ongoing, Progressing     Problem: Adjustment to Role " Transition (Postpartum  Delivery)  Goal: Successful Maternal Role Transition  Outcome: Ongoing, Progressing  Intervention: Support Maternal Role Transition  Recent Flowsheet Documentation  Taken 2022 by Maria M Bright RN  Supportive Measures: active listening utilized     Problem: Bleeding (Postpartum  Delivery)  Goal: Hemostasis  Outcome: Ongoing, Progressing     Problem: Infection (Postpartum  Delivery)  Goal: Absence of Infection Signs and Symptoms  Outcome: Ongoing, Progressing     Problem: Pain (Postpartum  Delivery)  Goal: Acceptable Pain Control  Outcome: Ongoing, Progressing  Intervention: Prevent or Manage Pain  Recent Flowsheet Documentation  Taken 2022 by Maria M Bright, RN  Pain Management Interventions:   medication (see MAR)   repositioned   rest     Problem: Postoperative Nausea and Vomiting (Postpartum  Delivery)  Goal: Nausea and Vomiting Relief  Outcome: Ongoing, Progressing     Problem: Postoperative Urinary Retention (Postpartum  Delivery)  Goal: Effective Urinary Elimination  Outcome: Ongoing, Progressing

## 2022-04-23 NOTE — PLAN OF CARE
Discharge orders.  Discharge instructions reviewed with patient and .  PARDEEP used for Kellie , but no  available after attempted for over 30 minutes.  While waiting for , per patient request, as she had been wanting discharge since morning, patient desires us to use her niece for interpreting.  Niece present at bedside.  Home medications reviewed and signed.  Hospitalist came to bedside to review diabetic medication and management as previously requested .  Still no Kellie .  Discharge instructions read through with niece interpreting.  Questions addressed and reviewed.  Infant in NICU.  Lactation had been in to see patient and patient reported that she does now desire to breastfeed.  Pumping reviewed, patient has pump.  Bottles and labels (for breastmilk for NICU infants) given to patient for use at home and encouraged to bring to hospital for baby.  Patient ambulatory to car, with RN assisting.  Ride home provided by esther with her  present with her.

## 2022-04-23 NOTE — DISCHARGE SUMMARY
HOSPITAL DISCHARGE SUMMARY -  Birth    Patient Name: Carlos Oliveros   YOB: 1990  Age: 31 year old  Medical Record Number: [id number]  Primary Physician: Juan David Coppola    Admission Date:  2022  Delivery Date: 22  Gestational Age at Delivery:  37w 4d   Discharge Date:  2022    REASON FOR ADMISSION: Labor and Delivery    DIAGNOSIS:    1.  Birth secondary to repeat  2. APGARS at 1 min 7, at 5 min 8  3. Baby's Weight 10 lbs 1.6 oz    Conditions complicating antepartum/postpartum:  Antepartum: Type II DM (por control, Hgb A1C 9.5 on admission), Anemia (Hgb 6.6 on arrival), GHTN, later/limited prenatal care, polyhydramnios, latent TB  Intrapartum: None  Postpartum: Acute on chronic anemia      PROCEDURES:  Lower transverse     SIGNIFICANT DIAGNOSTIC PROCEDURES:   None    CONSULTS: Hospitalist and diabetic educator    HISTORY OF PRESENT ILLNESS AND HOSPITAL COURSE: This is a 31 year old   female who underwent  section without complication secondary to repeat, Type II DM with poor control, GHTN and polyhydramnios. She was admitted prior to delivery for glycemic control. She was noted to be severely anemia and was transfused 3 units PRBCs.  Postoperative course was unremarkable. She tolerated the anemia. She was given IV iron. On the day of discharge patient was tolerating diet, pain was controlled with oral medications, she was voiding and passing gas.    LABS:  Hemoglobin   Date Value Ref Range Status   2022 7.4 (L) 11.7 - 15.7 g/dL Final   2022 7.7 (L) 11.7 - 15.7 g/dL Final       DISPOSITION:  Home    DISCHARGE CONDITION: Good/Stable    DISCHARGE MEDICATIONS:      Review of your medicines      START taking      Dose / Directions   acetaminophen 500 MG tablet  Commonly known as: TYLENOL  Used for: Acute post-operative pain      Dose: 500-1,000 mg  Take 1-2 tablets (500-1,000 mg) by mouth every 6 hours as needed for mild pain  Quantity: 60  tablet  Refills: 0     ferrous sulfate 325 (65 Fe) MG tablet  Commonly known as: FEROSUL      Dose: 325 mg  Take 1 tablet (325 mg) by mouth 2 times daily  Quantity: 60 tablet  Refills: 1     metFORMIN 500 MG 24 hr tablet  Commonly known as: GLUCOPHAGE-XR  Used for: Type 2 diabetes mellitus without complication, without long-term current use of insulin (H)      Dose: 500 mg  Take 1 tablet (500 mg) by mouth daily (with dinner)  Quantity: 30 tablet  Refills: 0     oxyCODONE 5 MG tablet  Commonly known as: ROXICODONE  Used for: Acute post-operative pain      Dose: 5 mg  Take 1 tablet (5 mg) by mouth every 4 hours as needed for severe pain  Quantity: 15 tablet  Refills: 0     senna-docusate 8.6-50 MG tablet  Commonly known as: SENOKOT-S/PERICOLACE  Used for: Other constipation      Dose: 1 tablet  Take 1 tablet by mouth 2 times daily  Quantity: 28 tablet  Refills: 0        CONTINUE these medicines which have NOT CHANGED      Dose / Directions   alcohol swab prep pads  Used for: Type 2 diabetes mellitus treated without insulin (H)      Use to swab area of injection/dennise as directed.  Quantity: 100 each  Refills: 3     * blood glucose monitoring meter device kit  Commonly known as: NO BRAND SPECIFIED  Used for: Type 2 diabetes mellitus treated without insulin (H)      Use to test blood sugar two times daily or as directed. Preferred blood glucose meter OR supplies to accompany: Blood Glucose Monitor Brands: per insurance.  Quantity: 1 kit  Refills: 0     * Accu-Chek Guide Me w/Device Kit  Used for: Pre-existing type 2 diabetes mellitus during pregnancy in third trimester      Dose: 1 each  1 each daily Check blood sugars 3 times per day  Quantity: 1 kit  Refills: 0     blood glucose test strip  Commonly known as: NO BRAND SPECIFIED  Used for: Type 2 diabetes mellitus treated without insulin (H)      Use to test blood sugar 4  times daily or as directed. To accompany: Blood Glucose Monitor Brands: per insurance (has been  using Accu-Chek Guide).  Quantity: 400 strip  Refills: 3     insulin pen needle 32G X 4 MM miscellaneous  Commonly known as: 32G X 4 MM  Used for: Type 2 diabetes mellitus treated without insulin (H)      Use 4 pen needles daily to inject insulin or as directed.  Quantity: 200 each  Refills: 1     prenatal multivitamin  with iron 28-0.8 MG Tabs  Used for: Pregnancy test positive      Dose: 1 tablet  Take 1 tablet by mouth daily  Quantity: 90 tablet  Refills: 3     thin lancets  Commonly known as: NO BRAND SPECIFIED  Used for: Type 2 diabetes mellitus treated without insulin (H)      Use to check glucose 4 times daily.  To accompany: Blood Glucose Monitor Brands: per insurance.  Quantity: 200 each  Refills: 11         * This list has 2 medication(s) that are the same as other medications prescribed for you. Read the directions carefully, and ask your doctor or other care provider to review them with you.            STOP taking    insulin aspart 100 UNIT/ML pen  Commonly known as: NovoLOG PEN        insulin glargine 100 UNIT/ML pen  Commonly known as: LANTUS PEN              Where to get your medicines      These medications were sent to [facility] [address] [address]    Phone: [phone]     acetaminophen 500 MG tablet    ferrous sulfate 325 (65 Fe) MG tablet    metFORMIN 500 MG 24 hr tablet    oxyCODONE 5 MG tablet    senna-docusate 8.6-50 MG tablet       DISCHARGE PLAN:   - Follow up with  Dr. Patel, in 1 week  - Take medication as prescribed  - Physical activity: As tolerated, no heavy lifting. Pelvic rest.  - Diet:  Regular  - Medication:  Please see MAR  - Warning signs discussed with patient about when to call the clinic/hospital  - All questions and concerns were answered for the patient prior to discharge.       Patricia Andrews MD  (P) [phone]    I saw the patient on the date of discharge  Total time spent  for discharge on date of discharge: 20 minutes    Physician(s) in addition to primary physician who should receive a copy:  CC1: Dr. Patel

## 2022-04-26 LAB
PATH REPORT.COMMENTS IMP SPEC: NORMAL
PATH REPORT.COMMENTS IMP SPEC: NORMAL
PATH REPORT.FINAL DX SPEC: NORMAL
PATH REPORT.GROSS SPEC: NORMAL
PATH REPORT.MICROSCOPIC SPEC OTHER STN: NORMAL
PATH REPORT.RELEVANT HX SPEC: NORMAL
PHOTO IMAGE: NORMAL

## 2022-07-12 ENCOUNTER — TRANSFERRED RECORDS (OUTPATIENT)
Dept: HEALTH INFORMATION MANAGEMENT | Facility: CLINIC | Age: 32
End: 2022-07-12

## 2022-07-13 ENCOUNTER — TELEPHONE (OUTPATIENT)
Dept: FAMILY MEDICINE | Facility: CLINIC | Age: 32
End: 2022-07-13

## 2022-07-13 NOTE — TELEPHONE ENCOUNTER
Patient in clinic as PCA for patient being seen by a Provider.  She is requesting Depo injection for contraception.  Informed her via phone   that no orders are noted and she needs an MA/LPN appointment.    Chart review indicates patient last saw PCP 2020 for diabetes.  On 22 she delivered a baby  by Patricia Andrews MD at [facility].  No encounters noted since delivery.    Patient instructed to go to  and request a future office visit with Provider to restart Depo.

## 2022-07-22 ENCOUNTER — ALLIED HEALTH/NURSE VISIT (OUTPATIENT)
Dept: FAMILY MEDICINE | Facility: CLINIC | Age: 32
End: 2022-07-22
Payer: COMMERCIAL

## 2022-07-22 DIAGNOSIS — Z30.42 ENCOUNTER FOR SURVEILLANCE OF INJECTABLE CONTRACEPTIVE: Primary | ICD-10-CM

## 2022-07-22 DIAGNOSIS — Z30.9 CONTRACEPTIVE MANAGEMENT: Primary | ICD-10-CM

## 2022-07-22 LAB — HCG UR QL: NEGATIVE

## 2022-07-22 PROCEDURE — 81025 URINE PREGNANCY TEST: CPT

## 2022-07-22 PROCEDURE — 99207 PR NO CHARGE NURSE ONLY: CPT

## 2022-07-22 PROCEDURE — 96372 THER/PROPH/DIAG INJ SC/IM: CPT | Performed by: FAMILY MEDICINE

## 2022-07-22 RX ORDER — MEDROXYPROGESTERONE ACETATE 150 MG/ML
150 INJECTION, SUSPENSION INTRAMUSCULAR
Status: ACTIVE | OUTPATIENT
Start: 2022-07-22 | End: 2023-07-17

## 2022-07-22 RX ADMIN — MEDROXYPROGESTERONE ACETATE 150 MG: 150 INJECTION, SUSPENSION INTRAMUSCULAR at 13:38

## 2022-09-14 ENCOUNTER — MEDICAL CORRESPONDENCE (OUTPATIENT)
Dept: FAMILY MEDICINE | Facility: CLINIC | Age: 32
End: 2022-09-14

## 2022-10-17 ENCOUNTER — ALLIED HEALTH/NURSE VISIT (OUTPATIENT)
Dept: FAMILY MEDICINE | Facility: CLINIC | Age: 32
End: 2022-10-17
Payer: COMMERCIAL

## 2022-10-17 DIAGNOSIS — Z30.42 ENCOUNTER FOR SURVEILLANCE OF INJECTABLE CONTRACEPTIVE: Primary | ICD-10-CM

## 2022-10-17 PROCEDURE — 99207 PR NO CHARGE NURSE ONLY: CPT

## 2022-10-17 PROCEDURE — 96372 THER/PROPH/DIAG INJ SC/IM: CPT | Performed by: FAMILY MEDICINE

## 2022-10-17 RX ADMIN — MEDROXYPROGESTERONE ACETATE 150 MG: 150 INJECTION, SUSPENSION INTRAMUSCULAR at 10:55

## 2023-04-24 NOTE — TELEPHONE ENCOUNTER
JEFFREY Coppola    RE: Ophthalmology referral    [phone] called back regarding the refferal I faxed.  They were able to get a hold of patient with an  to schedule but patient declined.  They tried to explain to her that she needs a DM eye exam but patient seems to think she can get the exam here in our office.     Rocio Lawrence  01.22.2020   No

## 2024-11-21 ENCOUNTER — TELEPHONE (OUTPATIENT)
Dept: FAMILY MEDICINE | Facility: CLINIC | Age: 34
End: 2024-11-21
Payer: COMMERCIAL

## 2024-11-21 NOTE — TELEPHONE ENCOUNTER
Patient Quality Outreach    Patient is due for the following:   Diabetes -  Diabetic Follow-Up Visit  Cervical Cancer Screening - PAP Needed  Physical Preventive Adult Physical      Topic Date Due    Pneumococcal Vaccine (1 of 2 - PCV) Never done    Flu Vaccine (1) 09/01/2024    COVID-19 Vaccine (1 - 2024-25 season) Never done       Action(s) Taken:   Patient declined follow up at this time.    Type of outreach:    Phone, spoke to patient/parent. Pt decline appt     Questions for provider review:               Teresa Zapata MA

## 2025-03-31 ENCOUNTER — TELEPHONE (OUTPATIENT)
Dept: FAMILY MEDICINE | Facility: CLINIC | Age: 35
End: 2025-03-31
Payer: COMMERCIAL

## 2025-03-31 NOTE — TELEPHONE ENCOUNTER
Patient Quality Outreach    Patient is due for the following:   Diabetes -  A1C, eGFR, Eye Exam, Microalbumin, Diabetic Follow-Up Visit, and Foot Exam  Cervical Cancer Screening - PAP Needed  Physical Preventive Adult Physical      Topic Date Due    Pneumococcal Vaccine (1 of 2 - PCV) Never done    Flu Vaccine (1) 09/01/2024    COVID-19 Vaccine (1 - 2024-25 season) Never done       Action(s) Taken:   Schedule a Adult Preventative    Type of outreach:    Phone, left message for patient/parent to call back.    Questions for provider review:    None         Teresa Zapata MA  Chart routed to None.

## (undated) DEVICE — SU MONOCRYL+ 4-0 18IN PS2 UND MCP496G

## (undated) DEVICE — ESU LIGASURE OPEN SEALER/DIVIDER SM JAW 16.5MM LF1212A

## (undated) DEVICE — SUTURE MAXON 0 GS-26 GRN LOOPED GMM341L

## (undated) DEVICE — CUSTOM PACK C-SECTION LHE

## (undated) DEVICE — NEEDLE HYPO 22X1-1/2 SAFETY 305900

## (undated) DEVICE — STPL SKIN SUBCUTICULAR INSORB  2030

## (undated) DEVICE — SURGICEL POWDER ABSORBABLE HEMOSTAT 3GM 3013SP

## (undated) DEVICE — UNDERPAD 30X30 BULK 949B10

## (undated) DEVICE — PACK MAJOR BASIN 673

## (undated) DEVICE — SUTURE VICRYL+ 0 27IN CT-1 UND VCP260H

## (undated) DEVICE — GLOVE BIOGEL PI ULTRATOUCH G SZ 8.0 42180

## (undated) DEVICE — PREP CHLORAPREP 26ML TINTED HI-LITE ORANGE 930815

## (undated) DEVICE — DRAPE IOBAN 2 C-SECTION 3M 6697

## (undated) DEVICE — SOL NACL 0.9% IRRIG 1000ML BOTTLE 2F7124

## (undated) DEVICE — DRSG PRIMAPORE 04X11 3/4"

## (undated) DEVICE — PLATE GROUNDING ADULT W/CORD 9165L

## (undated) DEVICE — PACK MINOR SINGLE BASIN SSK3001

## (undated) DEVICE — LINER PRINTED RED HAZARD 24X24 A4824PRRO

## (undated) DEVICE — HOVERMAT DISPOSABLE 34IN AIR MATT AM34D

## (undated) DEVICE — SUTURE VICRYL+ 3-0 27IN CT-1 UND VCP258H

## (undated) DEVICE — PAD INSERT XL WHITE 9 1/4 X 22 0712144

## (undated) RX ORDER — FENTANYL CITRATE-0.9 % NACL/PF 10 MCG/ML
PLASTIC BAG, INJECTION (ML) INTRAVENOUS
Status: DISPENSED
Start: 2022-04-20

## (undated) RX ORDER — MORPHINE SULFATE 1 MG/ML
INJECTION, SOLUTION EPIDURAL; INTRATHECAL; INTRAVENOUS
Status: DISPENSED
Start: 2022-04-20